# Patient Record
Sex: MALE | Race: WHITE | NOT HISPANIC OR LATINO | Employment: OTHER | URBAN - METROPOLITAN AREA
[De-identification: names, ages, dates, MRNs, and addresses within clinical notes are randomized per-mention and may not be internally consistent; named-entity substitution may affect disease eponyms.]

---

## 2017-11-13 ENCOUNTER — HOSPITAL ENCOUNTER (EMERGENCY)
Facility: HOSPITAL | Age: 47
Discharge: HOME | End: 2017-11-13
Attending: EMERGENCY MEDICINE | Admitting: EMERGENCY MEDICINE

## 2017-11-13 ENCOUNTER — APPOINTMENT (EMERGENCY)
Dept: RADIOLOGY | Facility: HOSPITAL | Age: 47
End: 2017-11-13

## 2017-11-13 VITALS
TEMPERATURE: 98 F | DIASTOLIC BLOOD PRESSURE: 99 MMHG | SYSTOLIC BLOOD PRESSURE: 145 MMHG | BODY MASS INDEX: 34.72 KG/M2 | WEIGHT: 248 LBS | HEIGHT: 71 IN | RESPIRATION RATE: 20 BRPM | HEART RATE: 78 BPM | OXYGEN SATURATION: 100 %

## 2017-11-13 DIAGNOSIS — M54.9 BACK PAIN: Primary | ICD-10-CM

## 2017-11-13 DIAGNOSIS — M54.50 LUMBAR BACK PAIN: ICD-10-CM

## 2017-11-13 PROCEDURE — 99283 EMERGENCY DEPT VISIT LOW MDM: CPT

## 2017-11-13 PROCEDURE — 72100 X-RAY EXAM L-S SPINE 2/3 VWS: CPT

## 2017-11-13 RX ORDER — METHOCARBAMOL 500 MG/1
1000 TABLET, FILM COATED ORAL ONCE
Status: COMPLETED | OUTPATIENT
Start: 2017-11-13 | End: 2017-11-13

## 2017-11-13 RX ORDER — PREDNISONE 20 MG/1
60 TABLET ORAL DAILY
Status: DISCONTINUED | OUTPATIENT
Start: 2017-11-14 | End: 2017-11-13

## 2017-11-13 RX ORDER — PREDNISONE 20 MG/1
60 TABLET ORAL DAILY
Qty: 15 TABLET | Refills: 0 | Status: SHIPPED | OUTPATIENT
Start: 2017-11-13 | End: 2017-11-18

## 2017-11-13 RX ORDER — NAPROXEN SODIUM 220 MG
220 TABLET ORAL EVERY 6 HOURS PRN
COMMUNITY

## 2017-11-13 RX ORDER — PREDNISONE 20 MG/1
60 TABLET ORAL ONCE
Status: COMPLETED | OUTPATIENT
Start: 2017-11-13 | End: 2017-11-13

## 2017-11-13 RX ORDER — METHOCARBAMOL 750 MG/1
750 TABLET, FILM COATED ORAL 3 TIMES DAILY
Qty: 15 TABLET | Refills: 0 | Status: SHIPPED | OUTPATIENT
Start: 2017-11-13 | End: 2022-06-09

## 2017-11-13 RX ADMIN — METHOCARBAMOL 1000 MG: 500 TABLET ORAL at 16:27

## 2017-11-13 RX ADMIN — PREDNISONE 60 MG: 20 TABLET ORAL at 16:28

## 2017-11-13 NOTE — ED NOTES
D/c instructions and rx reviewed w/ pt  All questions and concerns addressed at this time         Ministerio Ortiz RN  11/13/17 1151

## 2017-11-13 NOTE — DISCHARGE INSTRUCTIONS
Acute Low Back Pain, Ambulatory Care   GENERAL INFORMATION:   Acute low back pain  is discomfort in your lower back area that lasts for less than 12 weeks  The word acute is used to describe pain that starts suddenly, worsens quickly, and lasts for a short time  Common symptoms include the following:   · Back stiffness or spasms    · Pain down the back or side of one leg    · Holding yourself in an unusual position or posture to decrease your back pain    · Not being able to find a sitting position that is comfortable    · Slow increase in your pain for 24 to 48 hours after you stress your back    · Tenderness on your lower back or severe pain when you move your back  Seek immediate care for the following symptoms:   · Severe pain    · Sudden stiffness and heaviness in both buttocks down to both legs    · Numbness or weakness in one leg, or pain in both legs    · Numbness in your genital area or across your lower back    · Unable to control your urine or bowel movements  Treatment for acute low back pain  may include any of the following:  · Medicines:      ¨ NSAIDs  help decrease swelling and pain or fever  This medicine is available with or without a doctor's order  NSAIDs can cause stomach bleeding or kidney problems in certain people  If you take blood thinner medicine, always ask your healthcare provider if NSAIDs are safe for you  Always read the medicine label and follow directions  ¨ Muscle relaxers  help decrease muscle spasms pain  ¨ Prescription pain medicine  may be given  Ask how to take this medicine safely  · Surgery  may be needed if your pain is severe and other treatments do not work  Surgery may be needed for conditions of the lumbar spine, such as herniated disc or spinal stenosis  Manage your symptoms:   · Sleep on a firm mattress  If you do not have a firm mattress, have someone move your mattress to the floor for a few days   A piece of plywood under your mattress can also help make it firmer  · Apply ice  on your lower back for 15 to 20 minutes every hour or as directed  Use an ice pack, or put crushed ice in a plastic bag  Cover it with a towel  Ice helps prevent tissue damage and decreases swelling and pain  You can alternate ice and heat  · Apply heat  on your lower back for 20 to 30 minutes every 2 hours for as many days as directed  Heat helps decrease pain and muscle spasms  · Go to physical therapy  A physical therapist teaches you exercises to help improve movement and strength, and to decrease pain  Prevent acute low back pain:   · Use proper body mechanics  ¨ Bend at the hips and knees when you  objects  Do not bend from the waist  Use your leg muscles as you lift the load  Do not use your back  Keep the object close to your chest as you lift it  Try not to twist or lift anything above your waist     ¨ Change your position often when you stand for long periods of time  Rest one foot on a small box or footrest, and then switch to the other foot often  ¨ Try not to sit for long periods of time  When you do, sit in a straight-backed chair with your feet flat on the floor  Never reach, pull, or push while you are sitting  · Exercise regularly  Warm up before you exercise  Do exercises that strengthen your back muscles  Ask about the best exercise plan for you  · Maintain a healthy weight  Ask your healthcare provider how much you should weigh  Ask him to help you create a weight loss plan if you are overweight  Follow up with your healthcare provider as directed:  Return for a follow-up visit if you still have pain after 1 to 3 weeks of treatment  You may need to visit an orthopedist if your back pain lasts more than 6 to 12 weeks  Write down your questions so you remember to ask them during your visits  CARE AGREEMENT:   You have the right to help plan your care  Learn about your health condition and how it may be treated   Discuss treatment options with your caregivers to decide what care you want to receive  You always have the right to refuse treatment  The above information is an  only  It is not intended as medical advice for individual conditions or treatments  Talk to your doctor, nurse or pharmacist before following any medical regimen to see if it is safe and effective for you  © 2014 [address] is for End User's use only and may not be sold, redistributed or otherwise used for commercial purposes  All illustrations and images included in CareNotes® are the copyrighted property of A D A M , Inc  or Jerry Pantoja

## 2017-11-13 NOTE — ED PROVIDER NOTES
History  Chief Complaint   Patient presents with    Back Pain     Pt states he has been having R lower back pain for about 1 year  Pt states pain is radiating to R hip down to R foot  Pt c/o tingling in R thigh  Pt denies any injury or trauma to back  Pt denies any trouble urinating or moving bowels  49-year-old male patient presents emergency department for evaluation of one year of sciatic back pain which is worse over the last two days  Discussed this with him and the only reason he states he came to the emergency department was because his wife urged him to  The patient works as a donald, 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 and is known to have degenerative changes in his back  The patient and I discussed the utility of an x-ray purely because he has not had any done any time recently and this was done  The x-ray was reviewed primarily interpreted by me and I see only chronic changes  Patient has no signs of cauda equina  The patient will be treated for back pain and sciatica  History provided by:  Patient   used: No    Back Pain   Location:  Lumbar spine  Quality:  Aching  Radiates to:  Does not radiate  Pain severity:  Mild  Pain is:  Same all the time  Onset quality:  Gradual  Timing:  Constant  Progression:  Worsening  Chronicity:  New  Context: not jumping from heights, not lifting heavy objects, not MVA, not recent illness and not recent injury    Relieved by:  Nothing  Worsened by:  Nothing  Ineffective treatments:  None tried  Associated symptoms: no abdominal swelling, no bowel incontinence, no dysuria, no headaches, no leg pain, no numbness, no pelvic pain and no weakness        Prior to Admission Medications   Prescriptions Last Dose Informant Patient Reported?  Taking?   naproxen sodium (ALEVE) 220 MG tablet   Yes Yes   Sig: Take 220 mg by mouth every 6 (six) hours as needed for mild pain      Facility-Administered Medications: None       History reviewed  No pertinent past medical history  Past Surgical History:   Procedure Laterality Date    KNEE ARTHROSCOPY W/ MENISCAL REPAIR Bilateral     ROTATOR CUFF REPAIR Left        History reviewed  No pertinent family history  I have reviewed and agree with the history as documented  Social History   Substance Use Topics    Smoking status: Never Smoker    Smokeless tobacco: Never Used    Alcohol use Yes        Review of Systems   Gastrointestinal: Negative for bowel incontinence  Genitourinary: Negative for dysuria and pelvic pain  Musculoskeletal: Positive for back pain  Neurological: Negative for weakness, numbness and headaches  All other systems reviewed and are negative  Physical Exam  ED Triage Vitals [11/13/17 1504]   Temperature Pulse Respirations Blood Pressure SpO2   98 °F (36 7 °C) 78 20 145/99 100 %      Temp Source Heart Rate Source Patient Position - Orthostatic VS BP Location FiO2 (%)   Oral Monitor Sitting Left arm --      Pain Score       5           Orthostatic Vital Signs  Vitals:    11/13/17 1504   BP: 145/99   Pulse: 78   Patient Position - Orthostatic VS: Sitting       Physical Exam   Constitutional: He is oriented to person, place, and time  He appears well-developed and well-nourished  No distress  HENT:   Head: Normocephalic and atraumatic  Right Ear: External ear normal    Left Ear: External ear normal    Eyes: Conjunctivae and EOM are normal  Right eye exhibits no discharge  Left eye exhibits no discharge  No scleral icterus  Neck: Normal range of motion  Neck supple  No JVD present  No tracheal deviation present  No thyromegaly present  Cardiovascular: Normal rate and regular rhythm  Pulmonary/Chest: Effort normal and breath sounds normal  No stridor  No respiratory distress  He has no wheezes  He has no rales  Abdominal: Soft  Bowel sounds are normal  He exhibits no distension  There is no tenderness  Musculoskeletal: Normal range of motion  He exhibits no edema, tenderness or deformity  Neurological: He is alert and oriented to person, place, and time  No cranial nerve deficit  Coordination normal    Skin: Skin is warm and dry  He is not diaphoretic  Psychiatric: He has a normal mood and affect  His behavior is normal    Nursing note and vitals reviewed  ED Medications  Medications   methocarbamol (ROBAXIN) tablet 1,000 mg (1,000 mg Oral Given 11/13/17 1627)   predniSONE tablet 60 mg (60 mg Oral Given 11/13/17 1628)       Diagnostic Studies  Results Reviewed     None                 XR spine lumbar 2 or 3 views injury   Final Result by Rohan Llamas MD (11/13 1548)   Progressive multilevel degenerative spondylosis      No acute spinal osseous abnormality or malalignment         Workstation performed: [id number]                    Procedures  Procedures       Phone Contacts  ED Phone Contact    ED Course  ED Course                                MDM  Number of Diagnoses or Management Options  Back pain: new and requires workup  Lumbar back pain: new and requires workup     Amount and/or Complexity of Data Reviewed  Decide to obtain previous medical records or to obtain history from someone other than the patient: yes  Review and summarize past medical records: yes  Independent visualization of images, tracings, or specimens: yes    Patient Progress  Patient progress: stable    CritCare Time    Disposition  Final diagnoses:   Back pain   Lumbar back pain     Time reflects when diagnosis was documented in both MDM as applicable and the Disposition within this note     Time User Action Codes Description Comment    11/13/2017  4:20 PM Juan Pacheco Add [M54 9] Back pain     11/13/2017  4:21 PM Juan Pacheco Add [M54 5] Lumbar back pain       ED Disposition     ED Disposition Condition Comment    Discharge  Sydell Nipple discharge to home/self care      Condition at discharge: Good        Follow-up Information     Follow up With Specialties Details Why Contact Info Additional Information    [facility] Emergency Department Emergency Medicine  As needed [address] [facility] [address]  [address] [place] [address] ED, [address] [place], [place], [facility]        Discharge Medication List as of 11/13/2017  4:22 PM      START taking these medications    Details   methocarbamol (ROBAXIN) 750 mg tablet Take 1 tablet by mouth 3 (three) times a day for 5 days, Starting Mon 11/13/2017, Until Sat 11/18/2017, Print      predniSONE 20 mg tablet Take 3 tablets by mouth daily for 5 days, Starting Mon 11/13/2017, Until Sat 11/18/2017, Print         CONTINUE these medications which have NOT CHANGED    Details   naproxen sodium (ALEVE) 220 MG tablet Take 220 mg by mouth every 6 (six) hours as needed for mild pain, Historical Med           No discharge procedures on file      ED Provider  Electronically Signed by           Alok Patel DO  11/17/17 0207

## 2019-07-29 ENCOUNTER — NURSE TRIAGE (OUTPATIENT)
Dept: PHYSICAL THERAPY | Facility: OTHER | Age: 49
End: 2019-07-29

## 2019-07-29 DIAGNOSIS — M54.50 ACUTE MIDLINE LOW BACK PAIN WITHOUT SCIATICA: Primary | ICD-10-CM

## 2019-07-29 NOTE — TELEPHONE ENCOUNTER
Background - Initial Assessment  Clinical complaint: Acute on chronic low back pain, Pt has this current episode for the past 3 days  He has x-rays through us, and has numbness in his bilat feet, and it does go up to his mid-back area  Aleve, but states that it is not working, and he is using ice  Pt was told that he has arthritis in his back, and he works in construction  Date of onset: 3 days  Mechanism of injury: none    Previous Treatment - Previous Treatment  Previous evaluation: none  Current provider: PCP  Diagnostics: x-rays  Previous treatment: physical therapy for back, and shoulder, over the counter meds, and Ice  Protocols used: [facility] COMPREHENSIVE SPINE PROGRAM PROTOCOL    This nurse did review in detail the comp spine program and what we can provide for the pt for their back pain  Pt is agreeable to being triaged by this nurse and would like to have physical therapy  Referrals were placed to the following:  Physical Therapy at the [facility]     site  Pt was transferred to  to make evaluation appointment  [facility] referral was sent and Pt is aware that they will be receiving a phone call from that office  PM&R with Melo ESPANA at [address] [facility] [address] at the [employer]  Pt is aware that they will be receiving a phone call from that office to make their appointments  Pt is aware of who they will be seeing and location of that office  No further questions voiced at this time and Pt did state understanding of the referral that was placed

## 2022-05-23 ENCOUNTER — HOSPITAL ENCOUNTER (EMERGENCY)
Facility: HOSPITAL | Age: 52
Discharge: HOME | End: 2022-05-23
Attending: EMERGENCY MEDICINE | Admitting: EMERGENCY MEDICINE

## 2022-05-23 VITALS
BODY MASS INDEX: 38.49 KG/M2 | DIASTOLIC BLOOD PRESSURE: 102 MMHG | SYSTOLIC BLOOD PRESSURE: 202 MMHG | OXYGEN SATURATION: 94 % | WEIGHT: 274.91 LBS | HEIGHT: 71 IN | HEART RATE: 94 BPM | RESPIRATION RATE: 18 BRPM | TEMPERATURE: 99.5 F

## 2022-05-23 DIAGNOSIS — R03.0 ELEVATED BLOOD PRESSURE READING: ICD-10-CM

## 2022-05-23 DIAGNOSIS — M25.511 RIGHT SHOULDER PAIN: Primary | ICD-10-CM

## 2022-05-23 DIAGNOSIS — M79.606 LEG PAIN: ICD-10-CM

## 2022-05-23 PROCEDURE — 99283 EMERGENCY DEPT VISIT LOW MDM: CPT

## 2022-05-23 PROCEDURE — 99284 EMERGENCY DEPT VISIT MOD MDM: CPT | Performed by: EMERGENCY MEDICINE

## 2022-05-23 RX ORDER — PREDNISONE 20 MG/1
40 TABLET ORAL DAILY
Qty: 10 TABLET | Refills: 0 | Status: SHIPPED | OUTPATIENT
Start: 2022-05-23 | End: 2022-06-09

## 2022-05-23 NOTE — ED PROVIDER NOTES
History  Chief Complaint   Patient presents with    Leg Pain     R leg pain, started in lower back and now radiates down leg  Seen at chiropractor "but he made it worse " No known injury     45 yo male with h/o sciatica who presents to ED c/o R mehul pain  Starts in back and radiates to R lateral and anterior leg  Started approximately one week ago, states he woke up with it  No trauma  No fever  No weakness or numbness  Similar to pain from prior sciatica  Also c/o R shoulder pain worsened with attempting to lift RUE above his head  No arm numbness or weakness  No direct trauma  No relief of sxs from nsaids  Prior to Admission Medications   Prescriptions Last Dose Informant Patient Reported? Taking? methocarbamol (ROBAXIN) 750 mg tablet   No No   Sig: Take 1 tablet by mouth 3 (three) times a day for 5 days   naproxen sodium (ALEVE) 220 MG tablet   Yes No   Sig: Take 220 mg by mouth every 6 (six) hours as needed for mild pain      Facility-Administered Medications: None       History reviewed  No pertinent past medical history  Past Surgical History:   Procedure Laterality Date    KNEE ARTHROSCOPY W/ MENISCAL REPAIR Bilateral     ROTATOR CUFF REPAIR Left        History reviewed  No pertinent family history  I have reviewed and agree with the history as documented  E-Cigarette/Vaping    E-Cigarette Use Never User      E-Cigarette/Vaping Substances     Social History     Tobacco Use    Smoking status: Never Smoker    Smokeless tobacco: Never Used   Vaping Use    Vaping Use: Never used   Substance Use Topics    Alcohol use: Yes     Comment: sobor x1 mo 5/2022    Drug use: No       Review of Systems   Musculoskeletal: Positive for arthralgias and back pain  All other systems reviewed and are negative  Physical Exam  Physical Exam  Vitals and nursing note reviewed  Constitutional:       General: He is not in acute distress  Appearance: Normal appearance  He is well-developed   He is not ill-appearing, toxic-appearing or diaphoretic  HENT:      Head: Normocephalic and atraumatic  Eyes:      General:         Right eye: No discharge  Left eye: No discharge  Conjunctiva/sclera: Conjunctivae normal       Pupils: Pupils are equal, round, and reactive to light  Neck:      Vascular: No JVD  Pulmonary:      Effort: Pulmonary effort is normal       Breath sounds: No stridor  Musculoskeletal:         General: No swelling, tenderness, deformity or signs of injury  Normal range of motion  Cervical back: Normal range of motion and neck supple  Right lower leg: No edema  Left lower leg: No edema  Skin:     General: Skin is warm and dry  Capillary Refill: Capillary refill takes less than 2 seconds  Coloration: Skin is not jaundiced or pale  Findings: No bruising, erythema, lesion or rash  Neurological:      General: No focal deficit present  Mental Status: He is alert and oriented to person, place, and time  Cranial Nerves: No cranial nerve deficit  Sensory: No sensory deficit  Motor: No weakness or abnormal muscle tone        Coordination: Coordination normal       Gait: Gait normal          Vital Signs  ED Triage Vitals [05/23/22 1606]   Temperature Pulse Respirations Blood Pressure SpO2   99 5 °F (37 5 °C) 94 18 (!) 202/102 94 %      Temp Source Heart Rate Source Patient Position - Orthostatic VS BP Location FiO2 (%)   Oral Monitor Sitting Left arm --      Pain Score       --           Vitals:    05/23/22 1606   BP: (!) 202/102   Pulse: 94   Patient Position - Orthostatic VS: Sitting         Visual Acuity      ED Medications  Medications - No data to display    Diagnostic Studies  Results Reviewed     None                 No orders to display              Procedures  Procedures         ED Course                               SBIRT 22yo+    Flowsheet Row Most Recent Value   SBIRT (23 yo +)    In order to provide better care to our patients, we are screening all of our patients for alcohol and drug use  Would it be okay to ask you these screening questions? No Filed at: 05/23/2022 1625                    MDM    Disposition  Final diagnoses:   Right shoulder pain   Leg pain   Elevated blood pressure reading     Time reflects when diagnosis was documented in both MDM as applicable and the Disposition within this note     Time User Action Codes Description Comment    5/23/2022  4:32 PM Jo Ann Ra Add [M79 605] Left leg pain     5/23/2022  4:32 PM Jo Ann Ra Remove [M79 605] Left leg pain     5/23/2022  4:32 PM Jo Ann Ra Add [S34 613] Right leg pain     5/23/2022  4:32 PM Jo Ann Ra Add [M25 511] Right shoulder pain     5/23/2022  4:32 PM Jo Ann Ra Modify [M39 508] Right shoulder pain     5/23/2022  4:32 PM Jo Ann Ra Remove [C91 320] Right leg pain     5/23/2022  4:32 PM Jo Ann Ra Add [M79 606] Leg pain     5/23/2022  6:42 PM HaiderNevaeh Add [R03 0] Elevated blood pressure reading       ED Disposition     ED Disposition   Discharge    Condition   Stable    Date/Time   Mon May 23, 2022  4:32 PM    Comment   Derril Metro discharge to home/self care                 Follow-up Information     Follow up With Specialties Details Why Contact Info Additional Information    [facility] Physical Therapy Call in 1 day  [phone]    Jeremy Nolan MD Orthopedic Surgery In 1 week for further evaluation of your shoulder pain [address] [address]  [phone]             Discharge Medication List as of 5/23/2022  4:33 PM      START taking these medications    Details   predniSONE 20 mg tablet Take 2 tablets (40 mg total) by mouth in the morning , Starting Mon 5/23/2022, Print         CONTINUE these medications which have NOT CHANGED    Details   methocarbamol (ROBAXIN) 750 mg tablet Take 1 tablet by mouth 3 (three) times a day for 5 days, Starting Mon 11/13/2017, Until Sat 11/18/2017, Print      naproxen sodium (ALEVE) 220 MG tablet Take 220 mg by mouth every 6 (six) hours as needed for mild pain, Historical Med             No discharge procedures on file      PDMP Review     None          ED Provider  Electronically Signed by           Gary Allen MD  05/23/22 1204

## 2022-05-24 ENCOUNTER — NURSE TRIAGE (OUTPATIENT)
Dept: PHYSICAL THERAPY | Facility: OTHER | Age: 52
End: 2022-05-24

## 2022-05-24 DIAGNOSIS — G89.29 ACUTE EXACERBATION OF CHRONIC LOW BACK PAIN: Primary | ICD-10-CM

## 2022-05-24 DIAGNOSIS — M54.50 ACUTE EXACERBATION OF CHRONIC LOW BACK PAIN: Primary | ICD-10-CM

## 2022-05-24 NOTE — TELEPHONE ENCOUNTER
Additional Information   Negative: Is this related to a work injury?  Negative: Is this related to an MVA?  Negative: Are you currently recieving homecare services? Background - Initial Assessment  Clinical complaint: Pain starts mid back  And travels down right leg to ankle  Has numbness and tingling  Started 5/3/22  Was seen by ED and told to call csp  [facility]  Patient states he has arthritis in his back  Was seen by ED, PT , and chiro     Date of onset: 5/3/22  Frequency of pain: intermittent  Quality of pain: aching, burning, sharp, shooting and stabbing    **Patient does not have H I  says he "pays chavez OOP**    Protocols used: [facility] AMB COMPREHENSIVE SPINE PROGRAM PROTOCOL

## 2022-05-24 NOTE — TELEPHONE ENCOUNTER
Nurse reached out to proceed with triage for PT evaluation  Message left with reason for call and SL CB information  Nurse encouraged pt to return call when able to finish triage process  No referral to close/defer    Will await CB from patient

## 2022-05-24 NOTE — TELEPHONE ENCOUNTER
Additional Information   Negative: Has the patient had unexplained weight loss?  Negative: Does the patient have a fever?  Negative: Is the patient experiencing blood in sputum?  Negative: Is the patient experiencing urine retention?  Negative: Is the patient experiencing acute drop foot or paralysis?  Negative: Has the patient experienced major trauma? (fall from height, high speed collision, direct blow to spine) and is also experiencing nausea, light-headedness, or loss of consciousness?  Affirmative: Is this a chronic condition? Protocols used: [facility] AMB COMPREHENSIVE SPINE PROGRAM PROTOCOL    This RN did review in detail the Comprehensive Spine Program and what we can provide for their back pain  Patient is agreeable to being triaged by this RN and would like to proceed with Physical Therapy  Referral was placed for Physical Therapy at the [facility] site  Patients information was sent to the  to make evaluation appointment  Patient made aware that the PT office  will be calling to schedule the appointment  Patient was provided with the phone number to the PT office  No further questions and/or concerns were voiced by the patient at this time  Patient states understanding of the referral that was placed

## 2022-06-01 ENCOUNTER — EVALUATION (OUTPATIENT)
Dept: PHYSICAL THERAPY | Facility: CLINIC | Age: 52
End: 2022-06-01

## 2022-06-01 DIAGNOSIS — M54.50 ACUTE EXACERBATION OF CHRONIC LOW BACK PAIN: ICD-10-CM

## 2022-06-01 DIAGNOSIS — G89.29 ACUTE EXACERBATION OF CHRONIC LOW BACK PAIN: ICD-10-CM

## 2022-06-01 PROCEDURE — 97161 PT EVAL LOW COMPLEX 20 MIN: CPT | Performed by: PHYSICAL THERAPIST

## 2022-06-01 NOTE — PROGRESS NOTES
PT Evaluation     Today's date: 2022  Patient name: Ca Bauer  : 1970  MRN: [id number]  Referring provider: Miriam Tay, PT  Dx:   Encounter Diagnosis     ICD-10-CM    1  Acute exacerbation of chronic low back pain  M54 50 Ambulatory referral to PT spine    G89 29                   Assessment  Assessment details: Ca Bauer is a 46 year male presenting to physical therapy with chief complaints of lumbar radiculopathy  A mechanical assessment of the lumbar spine was performed today which was unsuccessful in reducing symptoms  He does have right lower extremity weakness in a L3/4 pattern  He also has decreased sensation along the lateral aspect of the right thigh  Secondary to the patient chronicity of symptoms, lower extremity weakness, lower extremity decreased sensation, and an inability to reduce symptoms with movements, patient is referred to orthopedic surgery for further consultation  Patient in agreement with POC  DC from PT  Impairments: abnormal or restricted ROM, activity intolerance, impaired physical strength and pain with function    Goals  No goals necessary secondary to referral to orthopedic surgery     Plan  Plan details: DC from PT        Subjective Evaluation    History of Present Illness  Mechanism of injury: Patient reports that he has a chronic history of lower back pain, works as a donald  He notes that about 4 weeks ago, he woke up and had no feeling in his lower extremities and had difficulties moving his lower extremity  He reports that his RLE continues to be weak  He reports having numbness and tingling on the lateral aspect of the R thigh  Notes that when he sits he also has pain in the gluteal region on the R side  Has been limiting his activity for the past couple of weeks  He reports having difficulty sleeping secondary to pain    He went to the ED on  and was given a prednisone taper which he reports helped but once he finished it, his pain levels have been returning  Patient denies having changes in bowel or bladder  He reports that he went to the chiropractor for treatment and feels that his back pain has been worse since that time  Pain  Current pain ratin  At worst pain rating: 10    Patient Goals  Patient goals for therapy: decreased pain, increased motion and increased strength          Objective     Neurological Testing     Sensation     Lumbar   Left   Intact: light touch    Right   Diminished: light touch  Paresthesia: light touch    Comments   Right light touch: L3/4 pattern    Reflexes   Left   Patellar (L4): normal (2+)  Achilles (S1): normal (2+)    Right   Patellar (L4): normal (2+)  Achilles (S1): normal (2+)    Active Range of Motion     Lumbar   Flexion:  with pain Restriction level: moderate  Extension:  with pain Restriction level: maximal  Left lateral flexion:  with pain Restriction level: moderate  Right lateral flexion:  Restriction level: minimal    Strength/Myotome Testing     Left Hip   Planes of Motion   Flexion: WFL    Right Hip   Planes of Motion   Flexion: 4+    Right Knee   Flexion: 3+  Extension: 3+    Right Ankle/Foot   Dorsiflexion: 5  Plantar flexion: 5  Great toe extension: 5    Muscle Activation   Patient able to activate left transverse abdominals, left multifidus, right transverse abdominals and right multifidus  Additional Muscle Activation Details  Fair Activation     Tests     Lumbar     Left   Negative crossed SLR, passive SLR and slump test      Right   Positive passive SLR and slump test    Negative crossed SLR  General Comments:      Lumbar Comments  Unable to reduce symptoms with repeated movements  Repeated movements in flexion and extension both produced symptoms and made it worse                 Precautions: Patient tolerance       Manuals                                                                 Neuro Re-Ed Ther Ex                                                                                                                     Ther Activity                                       Gait Training                                       Modalities

## 2022-06-04 ENCOUNTER — APPOINTMENT (EMERGENCY)
Dept: RADIOLOGY | Facility: HOSPITAL | Age: 52
End: 2022-06-04

## 2022-06-04 ENCOUNTER — HOSPITAL ENCOUNTER (EMERGENCY)
Facility: HOSPITAL | Age: 52
Discharge: HOME | End: 2022-06-04
Attending: EMERGENCY MEDICINE

## 2022-06-04 VITALS
OXYGEN SATURATION: 100 % | HEIGHT: 71 IN | WEIGHT: 268 LBS | SYSTOLIC BLOOD PRESSURE: 191 MMHG | RESPIRATION RATE: 18 BRPM | HEART RATE: 86 BPM | BODY MASS INDEX: 37.52 KG/M2 | TEMPERATURE: 97.4 F | DIASTOLIC BLOOD PRESSURE: 104 MMHG

## 2022-06-04 DIAGNOSIS — M25.569 KNEE PAIN: Primary | ICD-10-CM

## 2022-06-04 PROCEDURE — 99284 EMERGENCY DEPT VISIT MOD MDM: CPT | Performed by: EMERGENCY MEDICINE

## 2022-06-04 PROCEDURE — 99283 EMERGENCY DEPT VISIT LOW MDM: CPT

## 2022-06-04 PROCEDURE — 96372 THER/PROPH/DIAG INJ SC/IM: CPT

## 2022-06-04 PROCEDURE — 73564 X-RAY EXAM KNEE 4 OR MORE: CPT

## 2022-06-04 RX ORDER — KETOROLAC TROMETHAMINE 30 MG/ML
15 INJECTION, SOLUTION INTRAMUSCULAR; INTRAVENOUS ONCE
Status: COMPLETED | OUTPATIENT
Start: 2022-06-04 | End: 2022-06-04

## 2022-06-04 RX ORDER — ACETAMINOPHEN 325 MG/1
975 TABLET ORAL ONCE
Status: COMPLETED | OUTPATIENT
Start: 2022-06-04 | End: 2022-06-04

## 2022-06-04 RX ORDER — LIDOCAINE 50 MG/G
1 PATCH TOPICAL DAILY
Qty: 30 PATCH | Refills: 0 | Status: SHIPPED | OUTPATIENT
Start: 2022-06-04 | End: 2022-06-09

## 2022-06-04 RX ORDER — LIDOCAINE 50 MG/G
2 PATCH TOPICAL ONCE
Status: DISCONTINUED | OUTPATIENT
Start: 2022-06-04 | End: 2022-06-04 | Stop reason: HOSPADM

## 2022-06-04 RX ADMIN — KETOROLAC TROMETHAMINE 15 MG: 30 INJECTION, SOLUTION INTRAMUSCULAR at 10:17

## 2022-06-04 RX ADMIN — ACETAMINOPHEN 975 MG: 325 TABLET, FILM COATED ORAL at 10:11

## 2022-06-04 RX ADMIN — LIDOCAINE 5% 2 PATCH: 700 PATCH TOPICAL at 10:13

## 2022-06-04 NOTE — ED PROVIDER NOTES
History  Chief Complaint   Patient presents with    Knee Pain     Rt knee swelling and pain  Has been previously seen and referred to ortho (apt on the 13 th), but cannot handle pain until then  Patient is a 64-year-old male no past medical history presenting with right knee pain  Patient states the last 5-6 weeks he has had right knee pain and pain to the bilateral hips  He states that it is intermittent, worse with going from sitting to standing or lying to sitting and worse with ambulation  He states that he was seen here on the 23rd for back pain and given comprehensive spine follow-up  He states that he has seen physical therapy in but referred to Orthopedic surgery but has not been able to see them as yet, appointment in 2 weeks  Was initially given prednisone in the ER and states that that did improve his symptoms for week however the returned  He states that after returning roughly 2 weeks ago he had right knee swelling which is improved but not fully resolved since that time  He is taking Advil and Tylenol in combination prep every 6 hours with some relief, last dose was 6 hours ago  He denies any fevers and notes numbness and tingling to the right thigh which is intermittent  Denies any falls, fevers, urinary retention, saddle anesthesia, bowel or bladder incontinence  Pain is aching and nonradiating in nature  Prior to Admission Medications   Prescriptions Last Dose Informant Patient Reported? Taking? methocarbamol (ROBAXIN) 750 mg tablet   No No   Sig: Take 1 tablet by mouth 3 (three) times a day for 5 days   naproxen sodium (ALEVE) 220 MG tablet   Yes No   Sig: Take 220 mg by mouth every 6 (six) hours as needed for mild pain   predniSONE 20 mg tablet   No No   Sig: Take 2 tablets (40 mg total) by mouth in the morning  Facility-Administered Medications: None       History reviewed  No pertinent past medical history      Past Surgical History:   Procedure Laterality Date    KNEE ARTHROSCOPY W/ MENISCAL REPAIR Bilateral     ROTATOR CUFF REPAIR Left        History reviewed  No pertinent family history  I have reviewed and agree with the history as documented  E-Cigarette/Vaping    E-Cigarette Use Never User      E-Cigarette/Vaping Substances     Social History     Tobacco Use    Smoking status: Never Smoker    Smokeless tobacco: Never Used   Vaping Use    Vaping Use: Never used   Substance Use Topics    Alcohol use: Yes     Comment: sobor x1 mo 5/2022    Drug use: No       Review of Systems   All other systems reviewed and are negative  Physical Exam  Physical Exam  Vitals reviewed  Constitutional:       General: He is not in acute distress  Appearance: Normal appearance  He is not ill-appearing  HENT:      Mouth/Throat:      Mouth: Mucous membranes are moist    Eyes:      Conjunctiva/sclera: Conjunctivae normal    Cardiovascular:      Rate and Rhythm: Normal rate  Pulses: Normal pulses  Pulmonary:      Effort: Pulmonary effort is normal    Abdominal:      General: Abdomen is flat  Musculoskeletal:         General: Swelling and tenderness present  Normal range of motion  Cervical back: Neck supple  Comments: Patient has medial and lateral and anterior tenderness the knee, no ligamentous instability, ambulatory bedside with antalgic gait, no spinal tenderness, negative straight leg raise bilaterally, intact lower extremity sensation, motor, pulses   Skin:     General: Skin is warm and dry  Capillary Refill: Capillary refill takes less than 2 seconds  Neurological:      General: No focal deficit present  Mental Status: He is alert  Sensory: No sensory deficit  Motor: No weakness     Psychiatric:         Mood and Affect: Mood normal          Vital Signs  ED Triage Vitals [06/04/22 0920]   Temperature Pulse Respirations Blood Pressure SpO2   (!) 97 4 °F (36 3 °C) 86 18 (!) 191/104 100 %      Temp Source Heart Rate Source Patient Position - Orthostatic VS BP Location FiO2 (%)   Tympanic Monitor Sitting Left arm --      Pain Score       --           Vitals:    06/04/22 0920   BP: (!) 191/104   Pulse: 86   Patient Position - Orthostatic VS: Sitting         Visual Acuity      ED Medications  Medications   ketorolac (TORADOL) injection 15 mg (15 mg Intramuscular Given 6/4/22 1017)   acetaminophen (TYLENOL) tablet 975 mg (975 mg Oral Given 6/4/22 1011)       Diagnostic Studies  Results Reviewed     None                 XR knee 4+ vw right injury   ED Interpretation by Yassine Lazo DO (06/04 5778)   NAD      Final Result by John Hayward DO (06/04 0047)      No acute osseous abnormality  Moderate joint effusion  Degenerative changes as described  Workstation performed: [id number]                    Procedures  Procedures         ED Course                                             MDM  Number of Diagnoses or Management Options  Diagnosis management comments: Patient is a 22-year-old male no past medical history presenting with knee pain  Patient is well-appearing bedside stable vitals and in no acute distress  He does have hypertension but no history of same denies any hypertensive symptoms  Will give pain control and reassess  Patient does have mild edema with no erythema, increased warmth, with intact range of motion knee and ambulatory bedside with antalgic gait  He has negative straight leg raise bilaterally denies any red flag symptoms of cauda equina and has no spinal tenderness  Will obtain screening x-ray of the knee and if unremarkable will give Voltaren cream for likely arthritis, have advised appropriate Motrin and Tylenol dosing as patient was using a combination prep with suboptimal doses of both Motrin and Tylenol, will give orthopedic follow-up for knee pain and have advised continued follow-up with comprehensive spine for back pain    As patient has no ligamentous instability or injuries and is suffering back pain as well do not feel that knee immobilizer and crutches well improve his symptoms  Disposition  Final diagnoses:   Knee pain     Time reflects when diagnosis was documented in both MDM as applicable and the Disposition within this note     Time User Action Codes Description Comment    6/4/2022  9:59 AM Carol Rush Add [M25 569] Knee pain       ED Disposition     ED Disposition   Discharge    Condition   Stable    Date/Time   Sat Jun 4, 2022  9:59 AM    Comment   Darian Hays discharge to home/self care  Follow-up Information     Follow up With Specialties Details Why Contact Info Additional [address] [facility] Schedule an appointment as soon as possible for a visit   [address] [facility] [address] [phone]  [address] [facility], [id number] [place] [address], [address] [facility]          Discharge Medication List as of 6/4/2022  9:59 AM      START taking these medications    Details   Diclofenac Sodium (VOLTAREN) 1 % Apply 2 g topically 4 (four) times a day, Starting Sat 6/4/2022, Normal      lidocaine (LIDODERM) 5 % Apply 1 patch topically daily Remove & Discard patch within 12 hours or as directed by MD, Starting Sat 6/4/2022, Normal         CONTINUE these medications which have NOT CHANGED    Details   methocarbamol (ROBAXIN) 750 mg tablet Take 1 tablet by mouth 3 (three) times a day for 5 days, Starting Mon 11/13/2017, Until Sat 11/18/2017, Print      naproxen sodium (ALEVE) 220 MG tablet Take 220 mg by mouth every 6 (six) hours as needed for mild pain, Historical Med      predniSONE 20 mg tablet Take 2 tablets (40 mg total) by mouth in the morning , Starting Mon 5/23/2022, Print             No discharge procedures on file      PDMP Review     None          ED Provider  Electronically Signed by Lupe Mcrae DO  06/04/22 1446

## 2022-06-09 ENCOUNTER — OFFICE VISIT (OUTPATIENT)
Dept: FAMILY MEDICINE CLINIC | Facility: CLINIC | Age: 52
End: 2022-06-09

## 2022-06-09 VITALS
HEART RATE: 92 BPM | DIASTOLIC BLOOD PRESSURE: 76 MMHG | SYSTOLIC BLOOD PRESSURE: 142 MMHG | WEIGHT: 273 LBS | TEMPERATURE: 98.6 F | HEIGHT: 71 IN | OXYGEN SATURATION: 98 % | BODY MASS INDEX: 38.22 KG/M2

## 2022-06-09 DIAGNOSIS — M25.50 POLYARTHRALGIA: Primary | ICD-10-CM

## 2022-06-09 DIAGNOSIS — M25.461 PAIN AND SWELLING OF KNEE, RIGHT: ICD-10-CM

## 2022-06-09 DIAGNOSIS — Z59.89 DOES NOT HAVE HEALTH INSURANCE: ICD-10-CM

## 2022-06-09 DIAGNOSIS — F51.01 PRIMARY INSOMNIA: ICD-10-CM

## 2022-06-09 DIAGNOSIS — M25.561 PAIN AND SWELLING OF KNEE, RIGHT: ICD-10-CM

## 2022-06-09 PROCEDURE — 99204 OFFICE O/P NEW MOD 45 MIN: CPT | Performed by: STUDENT IN AN ORGANIZED HEALTH CARE EDUCATION/TRAINING PROGRAM

## 2022-06-09 RX ORDER — TRAZODONE HYDROCHLORIDE 50 MG/1
50 TABLET ORAL
Qty: 30 TABLET | Refills: 0 | Status: SHIPPED | OUTPATIENT
Start: 2022-06-09

## 2022-06-09 SDOH — ECONOMIC STABILITY - INCOME SECURITY: OTHER PROBLEMS RELATED TO HOUSING AND ECONOMIC CIRCUMSTANCES: Z59.89

## 2022-06-09 NOTE — PROGRESS NOTES
Assessment/Plan:         Problem List Items Addressed This Visit    None     Visit Diagnoses     Polyarthralgia    -  Primary    Relevant Medications    diclofenac sodium (VOLTAREN) 50 mg EC tablet    Other Relevant Orders    JAY JAY Screen w/ Reflex to Titer/Pattern    TSH, 3rd generation with Free T4 reflex    Ambulatory Referral to Physical Therapy    Comprehensive metabolic panel    CBC and differential    Uric acid    RF Screen w/ Reflex to Titer    JAY JAY Scr, IFA, W/Refl Titer/Pattern/Rheumatoid Arthritis Panel 1    Lyme Antibody Profile with reflex to WB    Pain and swelling of knee, right        Relevant Medications    diclofenac sodium (VOLTAREN) 50 mg EC tablet    Other Relevant Orders    JAY JAY Screen w/ Reflex to Titer/Pattern    TSH, 3rd generation with Free T4 reflex    Ambulatory Referral to Physical Therapy    Comprehensive metabolic panel    CBC and differential    Uric acid    RF Screen w/ Reflex to Titer    JAY JAY Scr, IFA, W/Refl Titer/Pattern/Rheumatoid Arthritis Panel 1    Lyme Antibody Profile with reflex to WB    Primary insomnia        Relevant Medications    traZODone (DESYREL) 50 mg tablet    Does not have health insurance            David differential   Do believe his right knee pain and swelling may be from an intra articular pathology  No physical exam findings overly concerned for meniscal or ligament tear  Review of ER notes and reviewed ER imaging  There is medial compartment arthritis  Recommend anti-inflammatory and ice along with physical therapy    There is family history of rheumatoid arthritis and this may explain multiple joint pains and information at this current time  However it believe it is more that his shoulder and lower back pain may be due to inactivity over the last 60 weeks  Continue with anti-inflammatory in PT and follow-up labs    Subjective:      Patient ID: Renetta Johansen is a 46 y o  male  HPI    Patient common in to establish care and discuss some joint pains  He reports for the last 6-8 weeks he has been dealing with severe right knee pain, back pain, and shoulder pain bilaterally  He believes his right knee pain started after he stepped in a hole and fell  Since then he has been mostly in bed and not moving as much  Has been to the ER twice and had x-rays  Has been given multiple medications with only prednisone provided mild relief  He has also not been sleeping due the pain  Did see physical therapy once recommended Dr  Evaluation    The following portions of the patient's history were reviewed and updated as appropriate:   Past Medical History:  He has no past medical history on file ,  _______________________________________________________________________  Medical Problems:  does not have a problem list on file ,  _______________________________________________________________________  Past Surgical History:   has a past surgical history that includes Rotator cuff repair (Left) and Knee arthroscopy w/ meniscal repair (Bilateral)  ,  _______________________________________________________________________  Family History:  family history includes Alcohol abuse in his father; Breast cancer in his maternal grandmother; Diabetes in his mother; Heart disease in his mother; Rheum arthritis in his mother; Stomach cancer in his father ,  _______________________________________________________________________  Social History:   reports that he has never smoked  He has never used smokeless tobacco  He reports current alcohol use  He reports that he does not use drugs  ,  _______________________________________________________________________  Allergies:  has No Known Allergies     _______________________________________________________________________  Current Outpatient Medications   Medication Sig Dispense Refill    diclofenac sodium (VOLTAREN) 50 mg EC tablet Take 1 tablet (50 mg total) by mouth 2 (two) times a day 60 tablet 5    naproxen sodium (ALEVE) 220 MG tablet Take 220 mg by mouth every 6 (six) hours as needed for mild pain      traZODone (DESYREL) 50 mg tablet Take 1 tablet (50 mg total) by mouth daily at bedtime 30 tablet 0     No current facility-administered medications for this visit      _______________________________________________________________________  Review of Systems   Constitutional: Positive for fatigue  Negative for chills and fever  HENT: Negative for rhinorrhea and sore throat  Eyes: Negative for visual disturbance  Respiratory: Negative for cough and shortness of breath  Cardiovascular: Negative for chest pain and palpitations  Gastrointestinal: Negative for abdominal pain, constipation, diarrhea, nausea and vomiting  Genitourinary: Negative for difficulty urinating, dysuria and frequency  Musculoskeletal: Positive for arthralgias, back pain, gait problem, joint swelling and myalgias  Skin: Negative for color change and rash  Neurological: Negative for weakness and headaches  Hematological: Negative for adenopathy  Does not bruise/bleed easily  Objective:  Vitals:    06/09/22 0849   BP: 142/76   Pulse: 92   Temp: 98 6 °F (37 °C)   SpO2: 98%   Weight: 124 kg (273 lb)   Height: 5' 11" (1 803 m)     Body mass index is 38 08 kg/m²  Physical Exam  Constitutional:       General: He is not in acute distress  Appearance: He is not ill-appearing  HENT:      Head: Normocephalic and atraumatic  Right Ear: External ear normal       Left Ear: External ear normal       Nose: Nose normal  No congestion or rhinorrhea  Mouth/Throat:      Mouth: Mucous membranes are moist       Pharynx: Oropharynx is clear  No oropharyngeal exudate or posterior oropharyngeal erythema  Eyes:      Extraocular Movements: Extraocular movements intact  Conjunctiva/sclera: Conjunctivae normal       Pupils: Pupils are equal, round, and reactive to light  Cardiovascular:      Rate and Rhythm: Normal rate and regular rhythm  Pulses: Normal pulses  Heart sounds: No murmur heard  Pulmonary:      Effort: Pulmonary effort is normal  No respiratory distress  Breath sounds: Normal breath sounds  No wheezing  Chest:      Chest wall: No tenderness  Abdominal:      General: Bowel sounds are normal       Palpations: Abdomen is soft  Tenderness: There is no abdominal tenderness  Musculoskeletal:      Right shoulder: Decreased range of motion  Left shoulder: Decreased range of motion  Cervical back: Normal range of motion  Lumbar back: Decreased range of motion  Positive right straight leg raise test and positive left straight leg raise test       Right knee: Effusion and crepitus present  Decreased range of motion  No LCL laxity, MCL laxity, ACL laxity or PCL laxity  Normal alignment, normal meniscus and normal patellar mobility  Normal pulse  Instability Tests: Anterior drawer test negative  Posterior drawer test negative  Anterior Lachman test negative  Medial Azael test negative and lateral Azael test negative  Left knee: Crepitus present  No LCL laxity, MCL laxity, ACL laxity or PCL laxity  Normal alignment, normal meniscus and normal patellar mobility  Normal pulse  Instability Tests: Anterior drawer test negative  Posterior drawer test negative  Anterior Lachman test negative  Medial Azael test negative and lateral Azael test negative  Skin:     General: Skin is warm and dry  Capillary Refill: Capillary refill takes less than 2 seconds  Findings: No rash  Neurological:      General: No focal deficit present  Mental Status: He is alert  Mental status is at baseline

## 2022-06-13 ENCOUNTER — APPOINTMENT (OUTPATIENT)
Dept: RADIOLOGY | Facility: CLINIC | Age: 52
End: 2022-06-13

## 2022-06-13 ENCOUNTER — OFFICE VISIT (OUTPATIENT)
Dept: OBGYN CLINIC | Facility: CLINIC | Age: 52
End: 2022-06-13

## 2022-06-13 VITALS
RESPIRATION RATE: 18 BRPM | HEIGHT: 71 IN | HEART RATE: 94 BPM | DIASTOLIC BLOOD PRESSURE: 84 MMHG | BODY MASS INDEX: 37.1 KG/M2 | SYSTOLIC BLOOD PRESSURE: 126 MMHG | WEIGHT: 265 LBS

## 2022-06-13 DIAGNOSIS — M54.50 CHRONIC LOW BACK PAIN WITHOUT SCIATICA, UNSPECIFIED BACK PAIN LATERALITY: ICD-10-CM

## 2022-06-13 DIAGNOSIS — M25.552 PAIN IN LEFT HIP: ICD-10-CM

## 2022-06-13 DIAGNOSIS — G89.29 CHRONIC LOW BACK PAIN WITHOUT SCIATICA, UNSPECIFIED BACK PAIN LATERALITY: ICD-10-CM

## 2022-06-13 DIAGNOSIS — M54.50 ACUTE EXACERBATION OF CHRONIC LOW BACK PAIN: ICD-10-CM

## 2022-06-13 DIAGNOSIS — M54.50 CHRONIC LOW BACK PAIN WITHOUT SCIATICA, UNSPECIFIED BACK PAIN LATERALITY: Primary | ICD-10-CM

## 2022-06-13 DIAGNOSIS — M17.11 PRIMARY OSTEOARTHRITIS OF RIGHT KNEE: ICD-10-CM

## 2022-06-13 DIAGNOSIS — G89.29 CHRONIC LOW BACK PAIN WITHOUT SCIATICA, UNSPECIFIED BACK PAIN LATERALITY: Primary | ICD-10-CM

## 2022-06-13 DIAGNOSIS — G89.29 ACUTE EXACERBATION OF CHRONIC LOW BACK PAIN: ICD-10-CM

## 2022-06-13 PROCEDURE — 72110 X-RAY EXAM L-2 SPINE 4/>VWS: CPT

## 2022-06-13 PROCEDURE — 99203 OFFICE O/P NEW LOW 30 MIN: CPT | Performed by: ORTHOPAEDIC SURGERY

## 2022-06-13 NOTE — PROGRESS NOTES
[id number] Card MD  [address] [place] [address]  Stephenie Almaraz [id number]  [phone]    HISTORY OF PRESENT ILLNESS:  Pleasant 46year old male presents to the office for an initial evaluation of low back pain  Patient notes having years of low back pain, stiffness, tightness, and unable to bend  He notes of recent increased low back pain with pain in the bilateral lower extremities and numbness of the bilateral lower extremities  Denies prior treatments including PT, injections, or surgery  He notes today pain in the right lower extremity continues and swelling on the right knee  ALLERGIES: No Known Allergies    MEDICATIONS:    Current Outpatient Medications:     diclofenac sodium (VOLTAREN) 50 mg EC tablet, Take 1 tablet (50 mg total) by mouth 2 (two) times a day, Disp: 60 tablet, Rfl: 5    naproxen sodium (ALEVE) 220 MG tablet, Take 220 mg by mouth every 6 (six) hours as needed for mild pain, Disp: , Rfl:     traZODone (DESYREL) 50 mg tablet, Take 1 tablet (50 mg total) by mouth daily at bedtime, Disp: 30 tablet, Rfl: 0     PAST MEDICAL HISTORY:   History reviewed  No pertinent past medical history  PAST SURGICAL HISTORY:  Past Surgical History:   Procedure Laterality Date    KNEE ARTHROSCOPY W/ MENISCAL REPAIR Bilateral     ROTATOR CUFF REPAIR Left        SOCIAL HISTORY:  Social History     Tobacco Use   Smoking Status Never Smoker   Smokeless Tobacco Never Used        ROS:  Review of Systems   Constitutional: Negative for appetite change, chills, fever and unexpected weight change  HENT: Negative for congestion, hearing loss, nosebleeds, sore throat and trouble swallowing  Eyes: Negative for pain, redness and visual disturbance  Respiratory: Negative for cough, shortness of breath and wheezing  Cardiovascular: Negative for chest pain, palpitations and leg swelling  Gastrointestinal: Negative for abdominal pain, nausea and vomiting     Endocrine: Negative for cold intolerance, heat intolerance, polydipsia and polyuria  Genitourinary: Negative for dysuria and hematuria  Musculoskeletal: Positive for back pain  Negative for gait problem and myalgias  Skin: Negative for rash and wound  Neurological: Negative for dizziness, light-headedness, numbness and headaches  Psychiatric/Behavioral: Negative for decreased concentration, dysphoric mood and suicidal ideas  The patient is not nervous/anxious  PHYSICAL EXAM:   Eyad 46year old male, in no acute distress  Overweight   Antalgic gait right lower extremity   Able to go up on heels and toes   Tender to palpation of lumbar spine   symetric range of motion of bilateral lower extremities   Discomfort of internal rotation of left hip  Sensation intact bilateral extremities       RADIOGRAPHIC STUDIES:  1 X-ray, L-spine, 6/13/22, multilevel lumbar degenerative disc disease, mild to moderate  Degenerative spondylolisthesis at L4-5  Mild to moderate facet disease  2 X-ray, R knee, 6/4/22, medial compartment greater than lateral compartment osteoarthritis  ASSESSMENT:  1  Chronic low back pain without sciatica, unspecified back pain laterality  -     XR spine lumbar minimum 4 views non injury; Future; Expected date: 06/13/2022  -     Ambulatory Referral to Physical Therapy; Future    2  Acute exacerbation of chronic low back pain  -     Ambulatory Referral to Orthopedic Surgery  -     Ambulatory Referral to Physical Therapy; Future    3  Primary osteoarthritis of right knee    4  Pain in left hip  -     Ambulatory Referral to Orthopedic Surgery; Future        PLAN:  Eyad 46year old male with chronic low back pain, DDD of lumbar spine, and OA of right knee  X-rays of the right knee and lumbar spine were reviewed with the patient and his spouse  He does have anxiety which is currently untreated with a history of panic attacks   He was advised to be treated with his PCP as it can amplify his chronic pain  He was prescribed PT for his lumbar spine and was referred to Dr Flores for his right knee OA  We will see the patient back for a repeat evaluation in 2 months  If he continues to have pain then we will obtain a MRI to further assess the source of pain            Scribe Attestation    I,:  Kaelyn Reyna MA am acting as a scribe while in the presence of the attending physician :       I,:  Ulises Patel MD personally performed the services described in this documentation    as scribed in my presence :

## 2022-06-16 ENCOUNTER — OFFICE VISIT (OUTPATIENT)
Dept: FAMILY MEDICINE CLINIC | Facility: CLINIC | Age: 52
End: 2022-06-16
Payer: COMMERCIAL

## 2022-06-16 VITALS
SYSTOLIC BLOOD PRESSURE: 140 MMHG | OXYGEN SATURATION: 99 % | HEIGHT: 71 IN | HEART RATE: 98 BPM | TEMPERATURE: 98.2 F | BODY MASS INDEX: 34.3 KG/M2 | WEIGHT: 245 LBS | DIASTOLIC BLOOD PRESSURE: 80 MMHG

## 2022-06-16 DIAGNOSIS — M25.50 POLYARTHRALGIA: ICD-10-CM

## 2022-06-16 DIAGNOSIS — F41.9 ANXIETY: Primary | ICD-10-CM

## 2022-06-16 DIAGNOSIS — F51.01 PRIMARY INSOMNIA: ICD-10-CM

## 2022-06-16 PROCEDURE — 99214 OFFICE O/P EST MOD 30 MIN: CPT | Performed by: STUDENT IN AN ORGANIZED HEALTH CARE EDUCATION/TRAINING PROGRAM

## 2022-06-16 RX ORDER — DULOXETIN HYDROCHLORIDE 20 MG/1
20 CAPSULE, DELAYED RELEASE ORAL DAILY
Qty: 45 CAPSULE | Refills: 0 | Status: SHIPPED | OUTPATIENT
Start: 2022-06-16 | End: 2022-06-23 | Stop reason: SDUPTHER

## 2022-06-16 NOTE — PROGRESS NOTES
Assessment/Plan:         Problem List Items Addressed This Visit    None     Visit Diagnoses     Anxiety    -  Primary    Relevant Medications    DULoxetine (CYMBALTA) 20 mg capsule    Polyarthralgia        Primary insomnia            Will try Cymbalta  He will follow-up labs for polyarthralgia as well    Subjective:      Patient ID: Sheryle Harbour is a 46 y o  male  HPI    Patient coming in today to follow-up on anxiety  States he has had anxiety most of his life  Was complicated several years ago he was hospitalized for chest pain and had a normal cardiac workup  It was also complicated that time as he was using cocaine but states he has not used this since 20/10  He also saw orthopedic surgeon for his back is recommended he optimize his anxiety as well  States his right knee is better but still has some swelling  States he has only used trazodone for sleep once and states it was helpful  He is afraid to use more often as he is afraid of addiction given his past    The following portions of the patient's history were reviewed and updated as appropriate:   Past Medical History:  He has no past medical history on file ,  _______________________________________________________________________  Medical Problems:  does not have a problem list on file ,  _______________________________________________________________________  Past Surgical History:   has a past surgical history that includes Rotator cuff repair (Left) and Knee arthroscopy w/ meniscal repair (Bilateral)  ,  _______________________________________________________________________  Family History:  family history includes Alcohol abuse in his father; Breast cancer in his maternal grandmother; Diabetes in his mother; Heart disease in his mother; Rheum arthritis in his mother; Stomach cancer in his father ,  _______________________________________________________________________  Social History:   reports that he has never smoked   He has never used smokeless tobacco  He reports current alcohol use  He reports that he does not use drugs  ,  _______________________________________________________________________  Allergies:  has No Known Allergies     _______________________________________________________________________  Current Outpatient Medications   Medication Sig Dispense Refill    diclofenac sodium (VOLTAREN) 50 mg EC tablet Take 1 tablet (50 mg total) by mouth 2 (two) times a day 60 tablet 5    DULoxetine (CYMBALTA) 20 mg capsule Take 1 capsule (20 mg total) by mouth daily 45 capsule 0    naproxen sodium (ALEVE) 220 MG tablet Take 220 mg by mouth every 6 (six) hours as needed for mild pain      traZODone (DESYREL) 50 mg tablet Take 1 tablet (50 mg total) by mouth daily at bedtime 30 tablet 0     No current facility-administered medications for this visit      _______________________________________________________________________  Review of Systems   Constitutional: Negative for chills, fatigue and fever  HENT: Negative for rhinorrhea and sore throat  Eyes: Negative for visual disturbance  Respiratory: Negative for cough and shortness of breath  Cardiovascular: Negative for chest pain and palpitations  Gastrointestinal: Negative for abdominal pain, constipation, diarrhea, nausea and vomiting  Genitourinary: Negative for difficulty urinating, dysuria and frequency  Musculoskeletal: Negative for arthralgias and myalgias  Skin: Negative for color change and rash  Neurological: Negative for weakness and headaches  Psychiatric/Behavioral: Positive for sleep disturbance  Objective:  Vitals:    06/16/22 1321   BP: 140/80   Pulse: 98   Temp: 98 2 °F (36 8 °C)   SpO2: 99%   Weight: 111 kg (245 lb)   Height: 5' 11" (1 803 m)     Body mass index is 34 17 kg/m²  Physical Exam  Constitutional:       General: He is not in acute distress  Appearance: Normal appearance  HENT:      Head: Normocephalic and atraumatic  Pulmonary:      Effort: Pulmonary effort is normal  No respiratory distress  Neurological:      Mental Status: He is alert and oriented to person, place, and time  Mental status is at baseline     Psychiatric:         Mood and Affect: Mood normal          Behavior: Behavior normal

## 2022-06-20 ENCOUNTER — TELEPHONE (OUTPATIENT)
Dept: FAMILY MEDICINE CLINIC | Facility: CLINIC | Age: 52
End: 2022-06-20

## 2022-06-20 NOTE — TELEPHONE ENCOUNTER
T/c from pt - pt feels that his medicine (diclofenac sodium (VOLTAREN) 50 mg EC tablet) is not working at all for his hands for the past 2 days - hands are swollen and painful / pt wondering if he can increase dose   Please advise

## 2022-06-23 ENCOUNTER — APPOINTMENT (OUTPATIENT)
Dept: LAB | Facility: HOSPITAL | Age: 52
End: 2022-06-23
Payer: COMMERCIAL

## 2022-06-23 DIAGNOSIS — M25.561 PAIN AND SWELLING OF KNEE, RIGHT: ICD-10-CM

## 2022-06-23 DIAGNOSIS — F41.9 ANXIETY: ICD-10-CM

## 2022-06-23 DIAGNOSIS — M25.50 POLYARTHRALGIA: ICD-10-CM

## 2022-06-23 DIAGNOSIS — M25.461 PAIN AND SWELLING OF KNEE, RIGHT: ICD-10-CM

## 2022-06-23 LAB
ALBUMIN SERPL BCP-MCNC: 2.4 G/DL (ref 3.5–5)
ALP SERPL-CCNC: 213 U/L (ref 46–116)
ALT SERPL W P-5'-P-CCNC: 228 U/L (ref 12–78)
ANION GAP SERPL CALCULATED.3IONS-SCNC: 13 MMOL/L (ref 4–13)
AST SERPL W P-5'-P-CCNC: 64 U/L (ref 5–45)
BASOPHILS # BLD MANUAL: 0.1 THOUSAND/UL (ref 0–0.1)
BASOPHILS NFR MAR MANUAL: 1 % (ref 0–1)
BILIRUB SERPL-MCNC: 0.52 MG/DL (ref 0.2–1)
BUN SERPL-MCNC: 16 MG/DL (ref 5–25)
CALCIUM ALBUM COR SERPL-MCNC: 10.7 MG/DL (ref 8.3–10.1)
CALCIUM SERPL-MCNC: 9.4 MG/DL (ref 8.3–10.1)
CHLORIDE SERPL-SCNC: 102 MMOL/L (ref 100–108)
CO2 SERPL-SCNC: 25 MMOL/L (ref 21–32)
CREAT SERPL-MCNC: 0.91 MG/DL (ref 0.6–1.3)
EOSINOPHIL # BLD MANUAL: 0 THOUSAND/UL (ref 0–0.4)
EOSINOPHIL NFR BLD MANUAL: 0 % (ref 0–6)
ERYTHROCYTE [DISTWIDTH] IN BLOOD BY AUTOMATED COUNT: 13.5 % (ref 11.6–15.1)
GFR SERPL CREATININE-BSD FRML MDRD: 95 ML/MIN/1.73SQ M
GLUCOSE P FAST SERPL-MCNC: 100 MG/DL (ref 65–99)
HCT VFR BLD AUTO: 36.1 % (ref 36.5–49.3)
HGB BLD-MCNC: 11.2 G/DL (ref 12–17)
LYMPHOCYTES # BLD AUTO: 1.26 THOUSAND/UL (ref 0.6–4.47)
LYMPHOCYTES # BLD AUTO: 13 % (ref 14–44)
MCH RBC QN AUTO: 26.4 PG (ref 26.8–34.3)
MCHC RBC AUTO-ENTMCNC: 31 G/DL (ref 31.4–37.4)
MCV RBC AUTO: 85 FL (ref 82–98)
METAMYELOCYTES NFR BLD MANUAL: 1 % (ref 0–1)
MONOCYTES # BLD AUTO: 0.58 THOUSAND/UL (ref 0–1.22)
MONOCYTES NFR BLD: 6 % (ref 4–12)
NEUTROPHILS # BLD MANUAL: 7.68 THOUSAND/UL (ref 1.85–7.62)
NEUTS SEG NFR BLD AUTO: 79 % (ref 43–75)
PLATELET # BLD AUTO: 660 THOUSANDS/UL (ref 149–390)
PLATELET BLD QL SMEAR: ABNORMAL
PMV BLD AUTO: 9.2 FL (ref 8.9–12.7)
POTASSIUM SERPL-SCNC: 4.2 MMOL/L (ref 3.5–5.3)
PROT SERPL-MCNC: 8.3 G/DL (ref 6.4–8.2)
RBC # BLD AUTO: 4.25 MILLION/UL (ref 3.88–5.62)
SODIUM SERPL-SCNC: 140 MMOL/L (ref 136–145)
TSH SERPL DL<=0.05 MIU/L-ACNC: 1.49 UIU/ML (ref 0.45–4.5)
URATE SERPL-MCNC: 4.9 MG/DL (ref 4.2–8)
WBC # BLD AUTO: 9.72 THOUSAND/UL (ref 4.31–10.16)

## 2022-06-23 PROCEDURE — 86038 ANTINUCLEAR ANTIBODIES: CPT

## 2022-06-23 PROCEDURE — 80053 COMPREHEN METABOLIC PANEL: CPT

## 2022-06-23 PROCEDURE — 84443 ASSAY THYROID STIM HORMONE: CPT

## 2022-06-23 PROCEDURE — 36415 COLL VENOUS BLD VENIPUNCTURE: CPT

## 2022-06-23 PROCEDURE — 86618 LYME DISEASE ANTIBODY: CPT

## 2022-06-23 PROCEDURE — 85007 BL SMEAR W/DIFF WBC COUNT: CPT

## 2022-06-23 PROCEDURE — 84550 ASSAY OF BLOOD/URIC ACID: CPT

## 2022-06-23 PROCEDURE — 86430 RHEUMATOID FACTOR TEST QUAL: CPT

## 2022-06-23 PROCEDURE — 85027 COMPLETE CBC AUTOMATED: CPT

## 2022-06-23 RX ORDER — DULOXETIN HYDROCHLORIDE 20 MG/1
20 CAPSULE, DELAYED RELEASE ORAL DAILY
Qty: 45 CAPSULE | Refills: 0 | Status: SHIPPED | OUTPATIENT
Start: 2022-06-23 | End: 2022-08-07

## 2022-06-23 NOTE — TELEPHONE ENCOUNTER
T/c from pt's wife -- Would like RX for Cymbalta sent to [facility] (cheaper then [facility])   Also wife stated that pt is having more pain in hands and fingers and is wondering if Dr Seymour Champagne would be able to call pt   to discuss medications       Please advise

## 2022-06-24 ENCOUNTER — TELEPHONE (OUTPATIENT)
Dept: FAMILY MEDICINE CLINIC | Facility: CLINIC | Age: 52
End: 2022-06-24

## 2022-06-24 ENCOUNTER — EVALUATION (OUTPATIENT)
Dept: PHYSICAL THERAPY | Age: 52
End: 2022-06-24

## 2022-06-24 DIAGNOSIS — G89.29 CHRONIC LOW BACK PAIN WITHOUT SCIATICA, UNSPECIFIED BACK PAIN LATERALITY: Primary | ICD-10-CM

## 2022-06-24 DIAGNOSIS — R79.89 ELEVATED PLATELET COUNT: ICD-10-CM

## 2022-06-24 DIAGNOSIS — G89.29 ACUTE EXACERBATION OF CHRONIC LOW BACK PAIN: ICD-10-CM

## 2022-06-24 DIAGNOSIS — R93.1 ELEVATED CORONARY ARTERY CALCIUM SCORE: ICD-10-CM

## 2022-06-24 DIAGNOSIS — R74.8 ELEVATED LIVER ENZYMES: Primary | ICD-10-CM

## 2022-06-24 DIAGNOSIS — M54.50 CHRONIC LOW BACK PAIN WITHOUT SCIATICA, UNSPECIFIED BACK PAIN LATERALITY: Primary | ICD-10-CM

## 2022-06-24 DIAGNOSIS — D64.9 LOW HEMATOCRIT: ICD-10-CM

## 2022-06-24 DIAGNOSIS — M54.50 ACUTE EXACERBATION OF CHRONIC LOW BACK PAIN: ICD-10-CM

## 2022-06-24 LAB
B BURGDOR IGG+IGM SER-ACNC: <0.2 AI
RHEUMATOID FACT SER QL LA: NEGATIVE
RYE IGE QN: NEGATIVE

## 2022-06-24 PROCEDURE — 97113 AQUATIC THERAPY/EXERCISES: CPT | Performed by: PHYSICAL THERAPIST

## 2022-06-24 PROCEDURE — 97162 PT EVAL MOD COMPLEX 30 MIN: CPT | Performed by: PHYSICAL THERAPIST

## 2022-06-24 NOTE — PROGRESS NOTES
PT Evaluation     Today's date: 2022  Patient name: Erlinda Manzanares  : 1970  MRN: [id number]  Referring provider: Kurt Orozco MD  Dx:   Encounter Diagnosis     ICD-10-CM    1  Chronic low back pain without sciatica, unspecified back pain laterality  M54 50     G89 29        Start Time: 0800  Stop Time: 0900  Total time in clinic (min): 60 minutes    Assessment  Assessment details: Erlinda Manzanares is a 46 y o  male who presents with pain, decreased strength, decreased ROM, ambulatory dysfunction and postural dysfunction  Due to these impairments, Patient has difficulty performing a/iadls and work-related activities  Patient's clinical presentation is consistent with their referring diagnosis of lumbar radiculopathy  Patient would benefit from skilled physical therapy to address their aforementioned impairments, improve their level of function and to improve their overall quality of life  Impairments: abnormal gait, abnormal muscle tone, abnormal or restricted ROM, abnormal movement, activity intolerance, impaired physical strength, lacks appropriate home exercise program, pain with function, weight-bearing intolerance, poor posture  and poor body mechanics  Understanding of Dx/Px/POC: good   Prognosis: fair    Goals  ST-3 WEEKS  1  Decrease pain by 2 points on VAS at its worst   2   Increase ROM by > 5 deg in all deficients planes  3   Increase CORE/LE by 1/2 MMT grade in all deficient planes  LT-6 WEEKS  1  Patient to be independent with a/iadls especially with walking and bending  2  Increase functional activities for leisure and home activities to previous LOF    3  Independent with HEP and/or fitness program     Plan  Plan details: Patient was evaluated and treated with a 1x visit and will continue with our step down independent pool program due to self pay status  Patient would benefit from: skilled physical therapy  Planned modality interventions: cryotherapy, electrical stimulation/Russian stimulation, thermotherapy: hydrocollator packs and unattended electrical stimulation  Planned therapy interventions: activity modification, behavior modification, body mechanics training, aquatic therapy, flexibility, functional ROM exercises, home exercise program, IADL retraining, joint mobilization, manual therapy, neuromuscular re-education, patient education, postural training, strengthening, stretching, therapeutic activities and therapeutic exercise  Frequency: 2x week (2-3x week)  Duration in weeks: 1  Plan of Care beginning date: 2022  Plan of Care expiration date: 2022  Treatment plan discussed with: patient        Subjective Evaluation    History of Present Illness  Date of onset: 2022  Mechanism of injury: Awoke with bilateral leg pain tried landed PT with no changes and now referred to aquatic therapy, complains of right leg pain and back pain also has right OA and is not working as as donald          Not a recurrent problem   Quality of life: good    Pain  Current pain ratin  At best pain ratin  At worst pain ratin  Quality: radiating and burning  Relieving factors: rest  Aggravating factors: standing, walking, stair climbing, lifting and sitting  Progression: no change    Social Support  Steps to enter house: yes  Stairs in house: yes   Lives in: multiple-level home  Lives with: spouse      Diagnostic Tests  X-ray: abnormal  Treatments  Previous treatment: chiropractic and physical therapy  Patient Goals  Patient goals for therapy: decreased pain, increased motion, increased strength and independence with ADLs/IADLs          Objective     Static Posture     Lumbar Spine   Increased lordosis  Knee   Genu varus  Palpation   Left   Hypertonic in the erector spinae  Right   Hypertonic in the erector spinae and lumbar paraspinals  Tenderness     Lumbar Spine  Tenderness in the spinous process  Right Hip   Tenderness in the PSIS       Active Range of Motion   Cervical/Thoracic Spine       Thoracic    Extension:  Restriction level: moderate    Lumbar   Flexion: 50 degrees  with pain  Extension: 15 degrees   Left lateral flexion: 25 degrees       Right lateral flexion: 20 degrees     Strength/Myotome Testing     Right Hip   Planes of Motion   Flexion: 4  Extension: 4  Abduction: 4+  Adduction: 4+    Left Knee   Extension: 4+    Right Knee   Flexion: 4+  Extension: 4    Right Ankle/Foot   Dorsiflexion: 4    Additional Strength Details  Core is 3/5    Tests     Lumbar     Right   Positive passive SLR and slump test      Ambulation     Observational Gait   Gait: antalgic   Decreased walking speed and stride length       Functional Assessment        Single Leg Stance   Left: 11 seconds  Right: 10 seconds        Precautions:     Daily Treatment Diary     Manual                                                                                   Exercise Diary  6/24            Water walking 5            Postural training             Gait training             Home exercise pgm/patient education             Wall: t/h raises 1            Hip abd/add 2            Marching 1            squats 1            Knee flex/ext             Step-ups (fwd/bkwd/ss)             SLS (eyes open/closed)             SLS w UE mvmt  AROM/ball toss             Weight shifting             UE Noodle work x 4              UE AROM             Resistive UE work (paddles, bells, TB)             Core work on noodle (sitting/stdg)             Sit on noodle with movement             Seated on pool bench w proper posture 1            Ankle df/pf 1            marching 1            Hip Ab/add 1            Knee flex/ext 1            Deep water mvmt 5            Deep water tx/stretching 5            Specific self - stretches wall/steps 5                Modalities  6/24            whirlpool 5

## 2022-07-05 ENCOUNTER — OFFICE VISIT (OUTPATIENT)
Dept: OBGYN CLINIC | Facility: CLINIC | Age: 52
End: 2022-07-05

## 2022-07-05 ENCOUNTER — APPOINTMENT (OUTPATIENT)
Dept: RADIOLOGY | Facility: CLINIC | Age: 52
End: 2022-07-05

## 2022-07-05 VITALS
WEIGHT: 245 LBS | BODY MASS INDEX: 34.3 KG/M2 | HEIGHT: 71 IN | HEART RATE: 111 BPM | SYSTOLIC BLOOD PRESSURE: 137 MMHG | DIASTOLIC BLOOD PRESSURE: 92 MMHG

## 2022-07-05 DIAGNOSIS — M25.552 PAIN IN LEFT HIP: ICD-10-CM

## 2022-07-05 DIAGNOSIS — M17.11 PRIMARY OSTEOARTHRITIS OF RIGHT KNEE: ICD-10-CM

## 2022-07-05 DIAGNOSIS — M25.561 RIGHT KNEE PAIN, UNSPECIFIED CHRONICITY: ICD-10-CM

## 2022-07-05 DIAGNOSIS — M25.461 EFFUSION OF RIGHT KNEE: ICD-10-CM

## 2022-07-05 DIAGNOSIS — M25.552 LEFT HIP PAIN: Primary | ICD-10-CM

## 2022-07-05 PROCEDURE — 73564 X-RAY EXAM KNEE 4 OR MORE: CPT

## 2022-07-05 PROCEDURE — 99214 OFFICE O/P EST MOD 30 MIN: CPT | Performed by: ORTHOPAEDIC SURGERY

## 2022-07-05 PROCEDURE — 89060 EXAM SYNOVIAL FLUID CRYSTALS: CPT | Performed by: PHYSICIAN ASSISTANT

## 2022-07-05 PROCEDURE — 20610 DRAIN/INJ JOINT/BURSA W/O US: CPT | Performed by: ORTHOPAEDIC SURGERY

## 2022-07-05 PROCEDURE — 73560 X-RAY EXAM OF KNEE 1 OR 2: CPT

## 2022-07-05 PROCEDURE — 89051 BODY FLUID CELL COUNT: CPT | Performed by: PHYSICIAN ASSISTANT

## 2022-07-05 PROCEDURE — 87476 LYME DIS DNA AMP PROBE: CPT | Performed by: PHYSICIAN ASSISTANT

## 2022-07-05 RX ORDER — BUPIVACAINE HYDROCHLORIDE 2.5 MG/ML
2 INJECTION, SOLUTION INFILTRATION; PERINEURAL
Status: COMPLETED | OUTPATIENT
Start: 2022-07-05 | End: 2022-07-05

## 2022-07-05 RX ORDER — METHYLPREDNISOLONE ACETATE 40 MG/ML
2 INJECTION, SUSPENSION INTRA-ARTICULAR; INTRALESIONAL; INTRAMUSCULAR; SOFT TISSUE
Status: COMPLETED | OUTPATIENT
Start: 2022-07-05 | End: 2022-07-05

## 2022-07-05 RX ORDER — LIDOCAINE HYDROCHLORIDE 10 MG/ML
2 INJECTION, SOLUTION INFILTRATION; PERINEURAL
Status: COMPLETED | OUTPATIENT
Start: 2022-07-05 | End: 2022-07-05

## 2022-07-05 RX ADMIN — LIDOCAINE HYDROCHLORIDE 2 ML: 10 INJECTION, SOLUTION INFILTRATION; PERINEURAL at 16:32

## 2022-07-05 RX ADMIN — BUPIVACAINE HYDROCHLORIDE 2 ML: 2.5 INJECTION, SOLUTION INFILTRATION; PERINEURAL at 16:32

## 2022-07-05 RX ADMIN — METHYLPREDNISOLONE ACETATE 2 ML: 40 INJECTION, SUSPENSION INTRA-ARTICULAR; INTRALESIONAL; INTRAMUSCULAR; SOFT TISSUE at 16:32

## 2022-07-05 NOTE — PROGRESS NOTES
Patient Name:  Charissa Cottrell  MRN:  [id number]    [address] [place], [place] [address]     1  Left hip pain  -     XR hip/pelv 2-3 vws left if performed; Future; Expected date: 07/05/2022    2  Pain in left hip  -     Ambulatory Referral to Orthopedic Surgery    3  Right knee pain, unspecified chronicity  -     XR knee 4+ vw right injury; Future; Expected date: 07/05/2022  -     XR knee 1 or 2 vw left; Future; Expected date: 07/05/2022    4  Effusion of right knee  -     Lyme disease, PCR; Future  -     Synovial fluid, crystal; Future  -     Synovial fluid white cell count w/ diff; Future  -     Large joint arthrocentesis: R knee  -     Lyme disease, PCR  -     Synovial fluid, crystal  -     Synovial fluid white cell count w/ diff    5  Primary osteoarthritis of right knee  -     Large joint arthrocentesis: R knee        46 y o  male with Right knee osteoarthritis  X-rays reviewed in office today with patient  In depth discussion had with patient regarding continued conservative management of Right knee osteoarthritis and effusion including OTC oral analgesics, topical analgesics, formal outpatient physical therapy for strengthening of quads, hamstrings, hip abductors, ice application, bracing, aspiration with corticosteroid injection  Patient verbalized understanding of the above and would like to proceed forward with aspiration and corticosteroid injection  Risks and benefits of aspiration and corticosteroid injection were discussed in detail  Risks including:  Post injection pain, elevation in blood sugar, skin discoloration, fatty atrophy, and infection were discussed in detail  The patient understood and elected to proceed forward  After sterile preparation the Right knee was aspirated and 30cc of yellow fluid were removed; sent for Lyme, white count, crystals  This was followed by  injection with 2 cc of 1% lidocaine, 2 cc of 0 25% bupivacaine, and 2 cc of Depo-Medrol    The patient tolerated the procedure and no immediate complications were noted  The patient was instructed to ice and elevate the injection site, limit strenuous activity for the next 24-48 hours, and contact us if there were any questions or concerns prior to their follow-up appointment  They were also instructed to contact their primary care physician to discuss elevated blood sugar  I will see the patient back in 8 weeks for reevaluation  Pending results of aspiration, can consider referral to PCP to discuss gout medications  Chief Complaint     Right knee pain    History of the Present Illness     Yue Mckeon is a 46 y o  male with Right knee pain ongoing for 1-2 months ago  He states he woke up one morning without feeling in his Right leg  Admits to improvement of sensation, but worsening of Right knee pain and swelling  He admits to history of surgical intervention of bilateral knees, >5 years ago at [facility], but does not recall the procedure or the dates of surgical intervention   Patient admits to swelling of Right knee with pain during deep knee flexion  Over time, he noted improvement of pain, but continued swelling  He denies history of gout and was recently tested  Patient currently sees Dr Rakel Knapp for his lumbar spine and attending aqua therapy  Review of Systems     Review of Systems   Constitutional: Negative for chills and fever  HENT: Negative for ear pain and sore throat  Eyes: Negative for pain and visual disturbance  Respiratory: Negative for cough and shortness of breath  Cardiovascular: Negative for chest pain and palpitations  Gastrointestinal: Negative for abdominal pain and vomiting  Genitourinary: Negative for dysuria and hematuria  Musculoskeletal: Negative for arthralgias and back pain  Skin: Negative for color change and rash  Neurological: Negative for seizures and syncope  All other systems reviewed and are negative        Physical Exam     /92   Pulse (!) 111   Ht 5' 11" (1 803 m) Wt 111 kg (245 lb)   BMI 34 17 kg/m²     Right Knee  Range of motion from 3 to 90 degrees with pain at end range  There is no crepitus with range of motion  There is moderate effusion  There is no tenderness over the medial or lateral joint line  There is 5/5 quadriceps strength and preserved tone  The patient is able to perform a straight leg raise  negative patellar grind test   Anterior drawer tests is negative  negative Lachman Test    Posterior drawer test is   negative   Varus stress testing reveals no pain or laxity at 0 and 30 degrees   Valgus stress testing reveals no pain or laxity at 0 and 30 degrees  Azael's testing is negative   The patient is neurovascular intact distally  Eyes:  Anicteric sclerae  Neck:  Supple  Lungs:  Normal respiratory effort  Cardiovascular:  Capillary refill is less than 2 seconds  Skin:  Intact without erythema  Neurologic:  Sensation grossly intact to light touch  Psychiatric:  Mood and affect are appropriate  Data Review     I have personally reviewed pertinent films in PACS, and my interpretation follows:    X-rays taken 07/05/2022 of Right knee demonstrates moderate to severe medial compartment degenerative changes and joint space narrowing  Mild degenerative changes of patellofemoral compartment  History reviewed  No pertinent past medical history      Past Surgical History:   Procedure Laterality Date    KNEE ARTHROSCOPY W/ MENISCAL REPAIR Bilateral     ROTATOR CUFF REPAIR Left        No Known Allergies    Current Outpatient Medications on File Prior to Visit   Medication Sig Dispense Refill    traZODone (DESYREL) 50 mg tablet Take 1 tablet (50 mg total) by mouth daily at bedtime 30 tablet 0    diclofenac sodium (VOLTAREN) 50 mg EC tablet Take 1 tablet (50 mg total) by mouth 2 (two) times a day (Patient not taking: Reported on 7/5/2022) 60 tablet 5    DULoxetine (CYMBALTA) 20 mg capsule Take 1 capsule (20 mg total) by mouth daily (Patient not taking: Reported on 7/5/2022) 45 capsule 0    naproxen sodium (ALEVE) 220 MG tablet Take 220 mg by mouth every 6 (six) hours as needed for mild pain (Patient not taking: Reported on 7/5/2022)       No current facility-administered medications on file prior to visit         Social History     Tobacco Use    Smoking status: Never Smoker    Smokeless tobacco: Never Used   Vaping Use    Vaping Use: Never used   Substance Use Topics    Alcohol use: Yes     Comment: sobor x1 mo 5/2022    Drug use: No       Family History   Problem Relation Age of Onset    Diabetes Mother     Heart disease Mother     Rheum arthritis Mother     Alcohol abuse Father     Stomach cancer Father     Breast cancer Maternal Grandmother              Procedures Performed     Large joint arthrocentesis: R knee  Universal Protocol:  Consent given by: patient  Patient identity confirmed: verbally with patient    Procedure Details  Location: knee - R knee  Needle size: 22 G  Approach: lateral  Medications administered: 2 mL bupivacaine 0 25 %; 2 mL lidocaine 1 %; 2 mL methylPREDNISolone acetate 40 mg/mL    Aspirate amount: 30 mL  Aspirate: yellow    Patient tolerance: patient tolerated the procedure well with no immediate complications  Dressing:  Sterile dressing applied              Sara Donovan PA-C

## 2022-07-07 LAB
CRYSTALS SNV QL MICRO: NORMAL
LYMPHOCYTES # SNV MANUAL: 7 %
MONOCYTES NFR SNV MANUAL: 13 %
NEUTROPHILS NFR SNV MANUAL: 76 %
NEUTS BAND NFR SNV: 4 %
SITE: ABNORMAL
TOTAL CELLS COUNTED SPEC: 100
WBC # FLD MANUAL: ABNORMAL /UL (ref 0–200)

## 2022-07-12 LAB — B BURGDOR DNA SPEC QL NAA+PROBE: NEGATIVE

## 2022-08-17 ENCOUNTER — OFFICE VISIT (OUTPATIENT)
Dept: FAMILY MEDICINE CLINIC | Facility: CLINIC | Age: 52
End: 2022-08-17

## 2022-08-17 VITALS
TEMPERATURE: 100.1 F | WEIGHT: 267 LBS | BODY MASS INDEX: 37.38 KG/M2 | HEART RATE: 119 BPM | HEIGHT: 71 IN | OXYGEN SATURATION: 98 % | SYSTOLIC BLOOD PRESSURE: 122 MMHG | DIASTOLIC BLOOD PRESSURE: 74 MMHG

## 2022-08-17 DIAGNOSIS — M25.50 POLYARTHRALGIA: Primary | ICD-10-CM

## 2022-08-17 DIAGNOSIS — F51.01 PRIMARY INSOMNIA: ICD-10-CM

## 2022-08-17 DIAGNOSIS — F41.9 ANXIETY: ICD-10-CM

## 2022-08-17 PROCEDURE — 99214 OFFICE O/P EST MOD 30 MIN: CPT | Performed by: STUDENT IN AN ORGANIZED HEALTH CARE EDUCATION/TRAINING PROGRAM

## 2022-08-17 RX ORDER — PREDNISONE 10 MG/1
10 TABLET ORAL DAILY
Qty: 7 TABLET | Refills: 0 | Status: SHIPPED | OUTPATIENT
Start: 2022-08-17 | End: 2022-08-24

## 2022-08-17 RX ORDER — CITALOPRAM 10 MG/1
10 TABLET ORAL DAILY
Qty: 45 TABLET | Refills: 0 | Status: SHIPPED | OUTPATIENT
Start: 2022-08-17

## 2022-08-19 NOTE — PROGRESS NOTES
Assessment/Plan:         Problem List Items Addressed This Visit    None     Visit Diagnoses     Polyarthralgia    -  Primary    Relevant Medications    predniSONE 10 mg tablet    Other Relevant Orders    Ambulatory Referral to Rheumatology    Primary insomnia        Anxiety        Relevant Medications    citalopram (CeleXA) 10 mg tablet        Change from cymbalta to celexa  Given the recurrent joint pains, swelling, and distal locations with a possibly family hx, high suspicion for Rheumatological process  Subjective:      Patient ID: Victor Hugo Jason is a 46 y o  male  HPI    Coming in to follow up  His hands and wrist have become painful and swollen again  Symptoms resolved with voltaren but symptoms returned shortly after finishing the course  Knees have also become more swollen and has a follow up with ortho  Labs thus far un remarkable, lyme negative, imaging showing arthritic changes  Stopped cymbalta as it made him feel weird  Also stopped trazadone as it was not helping with sleep    The following portions of the patient's history were reviewed and updated as appropriate:   Past Medical History:  He has no past medical history on file ,  _______________________________________________________________________  Medical Problems:  does not have a problem list on file ,  _______________________________________________________________________  Past Surgical History:   has a past surgical history that includes Rotator cuff repair (Left) and Knee arthroscopy w/ meniscal repair (Bilateral)  ,  _______________________________________________________________________  Family History:  family history includes Alcohol abuse in his father; Breast cancer in his maternal grandmother; Diabetes in his mother; Heart disease in his mother; Rheum arthritis in his mother; Stomach cancer in his father ,  _______________________________________________________________________  Social History:   reports that he has never smoked  He has never used smokeless tobacco  He reports current alcohol use  He reports that he does not use drugs  ,  _______________________________________________________________________  Allergies:  has No Known Allergies     _______________________________________________________________________  Current Outpatient Medications   Medication Sig Dispense Refill    citalopram (CeleXA) 10 mg tablet Take 1 tablet (10 mg total) by mouth daily 45 tablet 0    predniSONE 10 mg tablet Take 1 tablet (10 mg total) by mouth daily for 7 days 7 tablet 0     No current facility-administered medications for this visit      _______________________________________________________________________  Review of Systems   Constitutional: Positive for fatigue  Negative for chills and fever  HENT: Negative for rhinorrhea and sore throat  Eyes: Negative for visual disturbance  Respiratory: Negative for cough and shortness of breath  Cardiovascular: Negative for chest pain and palpitations  Gastrointestinal: Negative for abdominal pain, constipation, diarrhea, nausea and vomiting  Genitourinary: Negative for difficulty urinating, dysuria and frequency  Musculoskeletal: Positive for arthralgias, back pain, gait problem, joint swelling and myalgias  Skin: Negative for color change and rash  Neurological: Negative for weakness and headaches  Hematological: Negative for adenopathy  Does not bruise/bleed easily  Psychiatric/Behavioral: Positive for sleep disturbance  Objective:  Vitals:    08/17/22 1531   BP: 122/74   BP Location: Left arm   Patient Position: Sitting   Cuff Size: Large   Pulse: (!) 119   Temp: 100 1 °F (37 8 °C)   SpO2: 98%   Weight: 121 kg (267 lb)   Height: 5' 11" (1 803 m)     Body mass index is 37 24 kg/m²  Physical Exam  Constitutional:       General: He is not in acute distress  Appearance: He is not ill-appearing  HENT:      Head: Normocephalic and atraumatic        Right Ear: External ear normal       Left Ear: External ear normal       Nose: Nose normal  No congestion or rhinorrhea  Mouth/Throat:      Mouth: Mucous membranes are moist       Pharynx: Oropharynx is clear  No oropharyngeal exudate or posterior oropharyngeal erythema  Eyes:      Extraocular Movements: Extraocular movements intact  Conjunctiva/sclera: Conjunctivae normal       Pupils: Pupils are equal, round, and reactive to light  Cardiovascular:      Rate and Rhythm: Normal rate and regular rhythm  Pulses: Normal pulses  Heart sounds: No murmur heard  Pulmonary:      Effort: Pulmonary effort is normal  No respiratory distress  Breath sounds: Normal breath sounds  No wheezing  Chest:      Chest wall: No tenderness  Abdominal:      General: Bowel sounds are normal       Palpations: Abdomen is soft  Tenderness: There is no abdominal tenderness  Musculoskeletal:      Right wrist: Swelling present  Left wrist: Swelling present  Right hand: Swelling present  Left hand: Swelling present  Decreased range of motion  Cervical back: Normal range of motion  Right knee: Swelling present  Left knee: Swelling present  Skin:     General: Skin is warm and dry  Capillary Refill: Capillary refill takes less than 2 seconds  Findings: No rash  Neurological:      General: No focal deficit present  Mental Status: He is alert  Mental status is at baseline

## 2022-08-24 DIAGNOSIS — M25.50 POLYARTHRALGIA: ICD-10-CM

## 2022-08-24 RX ORDER — PREDNISONE 10 MG/1
10 TABLET ORAL DAILY
Qty: 7 TABLET | Refills: 0 | Status: CANCELLED | OUTPATIENT
Start: 2022-08-24 | End: 2022-08-31

## 2022-08-24 NOTE — TELEPHONE ENCOUNTER
Pt's significant other, Lia Mckinley, called and left message on [employer]  Requested refill on pt's predisone 10 mg but it looks like Dr Annmarie Colivn already refilled this on the 17th? Not sure if pt needs new refill or not?

## 2022-08-24 NOTE — TELEPHONE ENCOUNTER
Pt calling asking if he can continue prednisone for another 7 days   Advised Dr Vicky Wong is out - sending to clinical

## 2022-08-28 NOTE — PROGRESS NOTES
Patient Name:  Rachell Portillo  MRN:  [id number]    [address] [place] [address]  Bilateral primary osteoarthritis of knee  -     Injection Procedure Prior Authorization; Future  -     Large joint arthrocentesis: R knee  -     Large joint arthrocentesis: L knee        46 y o  male with bilateral knee osteoarthritis and effusion  In depth discussion had with patient regarding continued conservative management of Bilateral  knee osteoarthritis including OTC oral analgesics, topical analgesics, formal outpatient physical therapy for strengthening of quads, hamstrings, hip abductors, ice application, Medial  bracing, corticosteroid, viscosupplementation injections  Also strongly suggested patient to follow up with PCP and Rheumatology for continued evaluation  Patient verbalized understanding of the above and would like to proceed forward with bilateral knee aspiration and Right Toradol injection and Left corticosteroid injection  Risks and benefits of aspiration and corticosteroid injection were discussed in detail  Risks including: Post injection pain, elevation in blood sugar, skin discoloration, fatty atrophy, and infection were discussed in detail  The patient understood and elected to proceed forward  After sterile preparation the Right knee was aspirated which yielded 25cc of synovial fluid, followed by corticosteroid injection with 2 cc of 1% lidocaine, 2 cc of 0 25% bupivacaine, and 2 cc of Toradol-Medrol  The patient tolerated the procedure and no immediate complications were noted  Risks and benefits of aspiration and corticosteroid injection were discussed in detail  Risks including: Post injection pain, elevation in blood sugar, skin discoloration, fatty atrophy, and infection were discussed in detail  The patient understood and elected to proceed forward    After sterile preparation the Left knee was aspirated which yielded 42cc of straw colored synovial fluid, followed by corticosteroid injection with 2 cc of 1% lidocaine, 2 cc of 0 25% bupivacaine, and 2 cc of Depo-Medrol  The patient tolerated the procedure and no immediate complications were noted  The patient was instructed to ice and elevate the injection site, limit strenuous activity for the next 24-48 hours, and contact us if there were any questions or concerns prior to their follow-up appointment  Placed authorization for Durolane injections of bilateral knees  I will see the patient back if pain persists or worsens for likely aspiration and visco injections  History of the Present Illness   Ambrose Roland is a 46 y o  male with bilateral knee osteoarthritis s/p Right knee aspiration and corticosteroid injection 07/05/2022  Today, patient reports improvement of Right knee pain since injection, but since swelling has return  He also admits to Left knee pain and swelling since last appointment  He has been seeing his PCP for polyarthralgia and was recommended seeing a rheumatologist          Review of Systems     Review of Systems    Physical Exam     /95   Pulse (!) 109   Ht 5' 11" (1 803 m)   Wt 121 kg (267 lb)   BMI 37 24 kg/m²     Right Knee  Range of motion from 2 to 90 with pain at terminal flexion  There is moderate effusion  There is no tenderness over the medial or lateral joint line  The patient is able to perform a straight leg raise  Positive patellar grind  Varus stress testing reveals no laxity at 0 and 30 degrees   Valgus stress testing reveals no laxity at 0 and 30 degrees  The patient is neurovascular intact distally  Left knee   Range of motion from 2-100 degrees   There is moderate effusion  There is mild tenderness over medial and lateral       Data Review     I have personally reviewed pertinent films in PACS, and my interpretation follows  X-rays taken previosuly of Right knee demonstrates moderate to severe medial compartment degenerative changes and joint space narrowing   Mild degenerative changes of patellofemoral compartment       Social History     Tobacco Use    Smoking status: Never Smoker    Smokeless tobacco: Never Used   Vaping Use    Vaping Use: Never used   Substance Use Topics    Alcohol use: Yes     Comment: tessy x1 mo 5/2022    Drug use: No           Large joint arthrocentesis: R knee  Universal Protocol:  Consent given by: patient  Patient identity confirmed: verbally with patient    Procedure Details  Location: knee - R knee  Needle size: 22 G  Approach: lateral  Medications administered: 2 mL bupivacaine 0 25 %; 2 mL lidocaine 1 %; 30 mg ketorolac 30 mg/mL    Aspirate amount: 25 mL  Aspirate: yellow and cloudy    Patient tolerance: patient tolerated the procedure well with no immediate complications  Dressing:  Sterile dressing applied    Large joint arthrocentesis: L knee  Universal Protocol:  Consent given by: patient  Patient identity confirmed: verbally with patient    Procedure Details  Location: knee - L knee  Needle size: 22 G  Approach: lateral  Medications administered: 2 mL bupivacaine 0 25 %; 2 mL lidocaine 1 %; 2 mL methylPREDNISolone acetate 40 mg/mL    Aspirate amount: 42 mL  Aspirate: yellow, blood-tinged and clear    Patient tolerance: patient tolerated the procedure well with no immediate complications  Dressing:  Sterile dressing applied            Nan Hernandez DO

## 2022-08-29 RX ORDER — PREDNISONE 10 MG/1
10 TABLET ORAL DAILY
Qty: 7 TABLET | Refills: 0 | OUTPATIENT
Start: 2022-08-29 | End: 2022-09-05

## 2022-08-29 NOTE — TELEPHONE ENCOUNTER
Called pt -- states symptoms have improved  Swelling in hands and feet are better, but still not 100%  Can close his right hand, but left hand is still not able to close  Is asking if he should continue the prednisone       Please advise

## 2022-08-30 ENCOUNTER — OFFICE VISIT (OUTPATIENT)
Dept: OBGYN CLINIC | Facility: CLINIC | Age: 52
End: 2022-08-30

## 2022-08-30 VITALS
BODY MASS INDEX: 37.38 KG/M2 | HEIGHT: 71 IN | SYSTOLIC BLOOD PRESSURE: 161 MMHG | HEART RATE: 109 BPM | WEIGHT: 267 LBS | DIASTOLIC BLOOD PRESSURE: 95 MMHG

## 2022-08-30 DIAGNOSIS — M17.0 BILATERAL PRIMARY OSTEOARTHRITIS OF KNEE: Primary | ICD-10-CM

## 2022-08-30 PROCEDURE — 20610 DRAIN/INJ JOINT/BURSA W/O US: CPT | Performed by: ORTHOPAEDIC SURGERY

## 2022-08-30 PROCEDURE — 99214 OFFICE O/P EST MOD 30 MIN: CPT | Performed by: ORTHOPAEDIC SURGERY

## 2022-08-30 RX ORDER — BUPIVACAINE HYDROCHLORIDE 2.5 MG/ML
2 INJECTION, SOLUTION INFILTRATION; PERINEURAL
Status: COMPLETED | OUTPATIENT
Start: 2022-08-30 | End: 2022-08-30

## 2022-08-30 RX ORDER — LIDOCAINE HYDROCHLORIDE 10 MG/ML
2 INJECTION, SOLUTION INFILTRATION; PERINEURAL
Status: COMPLETED | OUTPATIENT
Start: 2022-08-30 | End: 2022-08-30

## 2022-08-30 RX ORDER — METHYLPREDNISOLONE ACETATE 40 MG/ML
2 INJECTION, SUSPENSION INTRA-ARTICULAR; INTRALESIONAL; INTRAMUSCULAR; SOFT TISSUE
Status: COMPLETED | OUTPATIENT
Start: 2022-08-30 | End: 2022-08-30

## 2022-08-30 RX ORDER — KETOROLAC TROMETHAMINE 30 MG/ML
30 INJECTION, SOLUTION INTRAMUSCULAR; INTRAVENOUS
Status: COMPLETED | OUTPATIENT
Start: 2022-08-30 | End: 2022-08-30

## 2022-08-30 RX ADMIN — BUPIVACAINE HYDROCHLORIDE 2 ML: 2.5 INJECTION, SOLUTION INFILTRATION; PERINEURAL at 13:54

## 2022-08-30 RX ADMIN — KETOROLAC TROMETHAMINE 30 MG: 30 INJECTION, SOLUTION INTRAMUSCULAR; INTRAVENOUS at 13:54

## 2022-08-30 RX ADMIN — LIDOCAINE HYDROCHLORIDE 2 ML: 10 INJECTION, SOLUTION INFILTRATION; PERINEURAL at 13:54

## 2022-08-30 RX ADMIN — METHYLPREDNISOLONE ACETATE 2 ML: 40 INJECTION, SUSPENSION INTRA-ARTICULAR; INTRALESIONAL; INTRAMUSCULAR; SOFT TISSUE at 13:54

## 2022-09-07 DIAGNOSIS — M17.0 BILATERAL PRIMARY OSTEOARTHRITIS OF KNEE: Primary | ICD-10-CM

## 2022-10-11 ENCOUNTER — TELEPHONE (OUTPATIENT)
Dept: OBGYN CLINIC | Facility: CLINIC | Age: 52
End: 2022-10-11

## 2022-10-11 NOTE — TELEPHONE ENCOUNTER
lmom for pt that he ns his appts with dr Rios Able for his bl gel inject for his knees and to call us back and let us know if he changed his mind about getting them

## 2022-11-08 ENCOUNTER — TELEPHONE (OUTPATIENT)
Dept: OBGYN CLINIC | Facility: HOSPITAL | Age: 52
End: 2022-11-08

## 2022-11-08 DIAGNOSIS — M06.9 RHEUMATOID ARTHRITIS FLARE (HCC): Primary | ICD-10-CM

## 2022-11-08 NOTE — TELEPHONE ENCOUNTER
Caller: Liliana Hernandez    Doctor: Maribel No    Reason for call: Wondering if patient can get in sooner and be put on a cancellation list  Patient is in a lot of pain, he can  Barley walk and move his hands       Call back#: [phone]

## 2022-11-10 ENCOUNTER — TELEPHONE (OUTPATIENT)
Dept: OBGYN CLINIC | Facility: CLINIC | Age: 52
End: 2022-11-10

## 2022-11-10 RX ORDER — PREDNISONE 10 MG/1
TABLET ORAL
Qty: 35 TABLET | Refills: 0 | Status: SHIPPED | OUTPATIENT
Start: 2022-11-10 | End: 2022-12-08

## 2022-11-10 NOTE — TELEPHONE ENCOUNTER
Caller: Antonio     Doctor: Ferdinand Gunderson    Reason for call: Patient called back he would like to  his 12/13 appt up  I did not see any sooner appts  He would like a call back       Call back#: [phone]

## 2022-11-10 NOTE — TELEPHONE ENCOUNTER
We have him on our cancellation list, but you can put him on a prednisone course to calm down his current inflammatory arthritis flare, 20mg daily for a week, 15mg for a week, 10mg for a week, 5mg for a week, then stop

## 2022-11-10 NOTE — TELEPHONE ENCOUNTER
lmom for pt about his visco appt that he cx today and he r/s to 12/13  I just wanted to make sure he wanted to wait that long or if he wanted me to get him a sooner appt per his not on 11/8 he was having increased pain and wanted an appt asap   He may be see sooner if he wants

## 2022-12-13 ENCOUNTER — OFFICE VISIT (OUTPATIENT)
Dept: OBGYN CLINIC | Facility: CLINIC | Age: 52
End: 2022-12-13

## 2022-12-13 VITALS
HEART RATE: 80 BPM | WEIGHT: 267 LBS | HEIGHT: 71 IN | SYSTOLIC BLOOD PRESSURE: 167 MMHG | BODY MASS INDEX: 37.38 KG/M2 | DIASTOLIC BLOOD PRESSURE: 106 MMHG

## 2022-12-13 DIAGNOSIS — M25.562 CHRONIC PAIN OF BOTH KNEES: ICD-10-CM

## 2022-12-13 DIAGNOSIS — M25.561 CHRONIC PAIN OF BOTH KNEES: ICD-10-CM

## 2022-12-13 DIAGNOSIS — M25.461 EFFUSION OF RIGHT KNEE: ICD-10-CM

## 2022-12-13 DIAGNOSIS — G89.29 CHRONIC PAIN OF BOTH KNEES: ICD-10-CM

## 2022-12-13 DIAGNOSIS — M25.462 EFFUSION OF LEFT KNEE: ICD-10-CM

## 2022-12-13 DIAGNOSIS — M17.0 BILATERAL PRIMARY OSTEOARTHRITIS OF KNEE: Primary | ICD-10-CM

## 2022-12-13 RX ORDER — MELOXICAM 15 MG/1
TABLET ORAL
COMMUNITY

## 2022-12-13 RX ORDER — LIDOCAINE HYDROCHLORIDE 10 MG/ML
2 INJECTION, SOLUTION INFILTRATION; PERINEURAL
Status: COMPLETED | OUTPATIENT
Start: 2022-12-13 | End: 2022-12-13

## 2022-12-13 RX ORDER — BUPIVACAINE HYDROCHLORIDE 2.5 MG/ML
2 INJECTION, SOLUTION INFILTRATION; PERINEURAL
Status: COMPLETED | OUTPATIENT
Start: 2022-12-13 | End: 2022-12-13

## 2022-12-13 RX ORDER — DULOXETIN HYDROCHLORIDE 20 MG/1
CAPSULE, DELAYED RELEASE ORAL
COMMUNITY

## 2022-12-13 RX ORDER — LIDOCAINE 50 MG/G
PATCH TOPICAL
COMMUNITY

## 2022-12-13 RX ORDER — TRAZODONE HYDROCHLORIDE 50 MG/1
TABLET ORAL
COMMUNITY

## 2022-12-13 RX ORDER — METHYLPREDNISOLONE ACETATE 40 MG/ML
2 INJECTION, SUSPENSION INTRA-ARTICULAR; INTRALESIONAL; INTRAMUSCULAR; SOFT TISSUE
Status: COMPLETED | OUTPATIENT
Start: 2022-12-13 | End: 2022-12-13

## 2022-12-13 RX ADMIN — BUPIVACAINE HYDROCHLORIDE 2 ML: 2.5 INJECTION, SOLUTION INFILTRATION; PERINEURAL at 11:11

## 2022-12-13 RX ADMIN — METHYLPREDNISOLONE ACETATE 2 ML: 40 INJECTION, SUSPENSION INTRA-ARTICULAR; INTRALESIONAL; INTRAMUSCULAR; SOFT TISSUE at 11:11

## 2022-12-13 RX ADMIN — LIDOCAINE HYDROCHLORIDE 2 ML: 10 INJECTION, SOLUTION INFILTRATION; PERINEURAL at 11:11

## 2022-12-13 NOTE — PROGRESS NOTES
Patient Name:  Eileen Roberts  MRN:  [id number]    [address] [place] [address]     1  Bilateral primary osteoarthritis of knee  -     Large joint arthrocentesis: R knee  -     Large joint arthrocentesis: L knee    2  Effusion of right knee    3  Effusion of left knee    4  Chronic pain of both knees      45 y o  male with Bilateral knee osteoarthritis s/p aspiration and Right knee toradol and Left knee corticosteroid injection on 08/30/2022  In depth conversation had with patient regarding continued nonoperative treatment including aspiration and corticosteroid vs visco injections  At this time, patient would like to move forward with corticosteroid injections as he is hesitant to receive the visco injections;if aspiration is performed would like to also provide injection to decrease recurrent effusion  In addition to injections, advised patient we can also place new order for medial  braces for his knees and provided patient business card to call for appointment for custom fitting  Verbalized understanding and wished to move forward with bilateral knee aspiration and corticosteroid injections  Risks and benefits of aspiration and corticosteroid injection were discussed in detail  Risks including: Post injection pain, elevation in blood sugar, skin discoloration, fatty atrophy, and infection were discussed in detail  The patient understood and elected to proceed forward  After sterile preparation the Right knee was aspirated which yielded 50cc of synovial fluid, followed by corticosteroid injection with 2 cc of 1% lidocaine, 2 cc of 0 25% bupivacaine, and 2 cc of Depo-Medrol  The patient tolerated the procedure and no immediate complications were noted  Risks and benefits of aspiration and corticosteroid injection were discussed in detail  Risks including: Post injection pain, elevation in blood sugar, skin discoloration, fatty atrophy, and infection were discussed in detail    The patient understood and elected to proceed forward  After sterile preparation the Left knee was aspirated which yielded 45cc of synovial fluid, followed by corticosteroid injection with 2 cc of 1% lidocaine, 2 cc of 0 25% bupivacaine, and 2 cc of Depo-Medrol  The patient tolerated the procedure and no immediate complications were noted  The patient was instructed to ice and elevate the injection site, limit strenuous activity for the next 24-48 hours, and contact us if there were any questions or concerns prior to their follow-up appointment  I will see the patient back in 3 months for reevaluation of bilateral knees and consideration of visco injections at that time  Continue follow up with [facility] physician for discussion of recent blood work  History of the Present Illness   Alanis Blas is a 45 y o  male  with Bilateral knee osteoarthritis s/p aspiration and Right knee toradol and Left knee corticosteroid injection on 08/30/2022  Today, patient reports he is doing well s/p last appointment  He recently saw a new doctor at [facility] secondary to multiple joint complaints; he was prescribed Meloxicam which improved his pain and is now able to get up and down off the floor with mild pain  He wishes to hold off on visco injections at this time and only wants aspiration today  Review of Systems     Review of Systems   Constitutional: Negative for chills and fever  HENT: Negative for ear pain and sore throat  Eyes: Negative for pain and visual disturbance  Respiratory: Negative for cough and shortness of breath  Cardiovascular: Negative for chest pain and palpitations  Gastrointestinal: Negative for abdominal pain and vomiting  Genitourinary: Negative for dysuria and hematuria  Musculoskeletal: Negative for arthralgias and back pain  Skin: Negative for color change and rash  Neurological: Negative for seizures and syncope  All other systems reviewed and are negative        Physical Exam     BP (!) 167/106   Pulse 80   Ht 5' 11" (1 803 m)   Wt 121 kg (267 lb)   BMI 37 24 kg/m²     Right Knee  Range of motion from 2 to 90 degrees  There is moderate effusion  There is tenderness over the medial joint line  The patient is able to perform a straight leg raise with 5/5 quad strength  Varus stress testing reveals no instability at 0 and 30 degrees   Valgus stress testing reveals no instability at 0 and 30 degrees  The patient is neurovascular intact distally  Left  Knee  Range of motion from 2 to 90 degrees  There is mild-moderate effusion  There is tenderness over the medial joint line  The patient is able to perform a straight leg raise  Varus stress testing reveals no instability at 0 and 30 degrees   Valgus stress testing reveals no instability at 0 and 30 degrees  The patient is neurovascular intact distally  Data Review     I have personally reviewed pertinent films in PACS, and my interpretation follows  X-rays taken 07/2022 of Right knee demonstrates moderate to severe medial compartment degenerative changes and mild patellofemoral changes  Also visualized Left knee with severe medial compartment degenerative changes and sclerosis  No acute fractures at that time       Social History     Tobacco Use   • Smoking status: Never   • Smokeless tobacco: Never   Vaping Use   • Vaping Use: Never used   Substance Use Topics   • Alcohol use: Yes     Comment: sobor x1 mo 5/2022   • Drug use: No           Large joint arthrocentesis: R knee  Universal Protocol:  Consent given by: patient  Patient identity confirmed: verbally with patient    Procedure Details  Location: knee - R knee  Needle size: 22 G  Approach: lateral  Medications administered: 2 mL bupivacaine 0 25 %; 2 mL lidocaine 1 %; 2 mL methylPREDNISolone acetate 40 mg/mL    Aspirate amount: 50 mL  Aspirate: yellow    Patient tolerance: patient tolerated the procedure well with no immediate complications  Dressing: Sterile dressing applied    Large joint arthrocentesis: L knee  Universal Protocol:  Consent given by: patient  Patient identity confirmed: verbally with patient    Procedure Details  Location: knee - L knee  Needle size: 22 G  Approach: lateral  Medications administered: 2 mL bupivacaine 0 25 %; 2 mL lidocaine 1 %; 2 mL methylPREDNISolone acetate 40 mg/mL    Aspirate amount: 45 mL  Aspirate: yellow    Patient tolerance: patient tolerated the procedure well with no immediate complications  Dressing:  Sterile dressing applied            Sonal Lopez DO

## 2023-04-06 ENCOUNTER — TELEPHONE (OUTPATIENT)
Dept: FAMILY MEDICINE CLINIC | Facility: CLINIC | Age: 53
End: 2023-04-06

## 2023-04-06 NOTE — TELEPHONE ENCOUNTER
T/c from pts wife -- pt wants to switch care from Dr Marshell Denver to [facility], because he needs a more established provider  Please advise

## 2023-04-07 NOTE — TELEPHONE ENCOUNTER
Please advise patient and his wife that a one time switch is permitted, however Justine is not in the office everyday  I do consider Dr Delilah Salguero a well established provider, also

## 2023-04-24 NOTE — PROGRESS NOTES
Assessment and Plan:   *Kendal Felty is a 45 y o   male who presents as a Rheumatology consult referred by Bethany Black MD for evaluation of inflammatory arthritis  Patient's clinical picture is consistent with Rheumatoid arthritis and gout  Admits to 2 hours of morning stiffness; would benefit from at least starting hydroxychloroquine  Will double check his uric acid level before deciding whether he would benefit from chronic urate lowering therapy such as allopurinol; last uric acid level was 8 9  Do labs ASAP  Continue meloxicam daily as needed for joint pain  Start hydroxychloroquine twice a day  Take prednisone 10mg daily for a week, then 5mg daily for a week  Follow gout diet  Bring hand x-ray reports to next visit    Return to clinic in 4 months    Plan:  Diagnoses and all orders for this visit:    Rheumatoid arthritis, involving unspecified site, unspecified whether rheumatoid factor present (HCC)  -     meloxicam (MOBIC) 15 mg tablet; Take 1 tablet (15 mg total) by mouth daily  -     CBC and differential  -     Comprehensive metabolic panel  -     C-reactive protein  -     Sedimentation rate, automated  -     Discontinue: hydroxychloroquine (PLAQUENIL) 200 mg tablet; Take 1 tablet (200 mg total) by mouth 2 (two) times a day  -     Cyclic citrul peptide antibody, IgG  -     predniSONE 5 mg tablet; Take 2 tablets (10 mg total) by mouth daily for 7 days, THEN 1 tablet (5 mg total) daily for 7 days  Polyarthralgia  -     Ambulatory Referral to Rheumatology  -     meloxicam (MOBIC) 15 mg tablet; Take 1 tablet (15 mg total) by mouth daily  -     Uric acid    Gout, unspecified cause, unspecified chronicity, unspecified site  -     meloxicam (MOBIC) 15 mg tablet;  Take 1 tablet (15 mg total) by mouth daily  -     Uric acid    Low vitamin D level  -     Vitamin D 25 hydroxy    High risk medication use  High risk medication use - Benefits and risks of hydroxychloroquine, including but not limited to retinal toxicity, corneal deposits, gastrointestinal side effects, and headaches were discussed with the patient  The need for a regular eye exam to monitor for ocular toxicity while on this medication was also explained to the patient  Follow-up plan: RTC in 4 months        HPI  Felix Pierre is a 45 y o   male who presents as a Rheumatology consult referred by Tano Whitaker MD for evaluation of possible autoimmune disease  A year ago, suddenly started having diffuse joint pain and stiffness  It lasted for some time; was given prednisone, helped some  Returned when he was off prednisone  Was home for 2-3 months  In July 2023, started getting a little better, and feels almost back to baseline except for joints in fingers and feet  Couldn't raise arms, legs, or walk  Hands and feet got swollen  Review of Systems  Review of Systems   Constitutional: Negative for fatigue  HENT: Negative for mouth sores  Respiratory: Negative for cough and shortness of breath  Cardiovascular: Positive for leg swelling  Negative for chest pain  Hypertension   Gastrointestinal: Negative for abdominal pain, constipation and diarrhea  Musculoskeletal: Positive for arthralgias, back pain and myalgias  Negative for joint swelling  Skin: Negative for color change and rash  Neurological: Negative for weakness  Hematological: Negative for adenopathy  Psychiatric/Behavioral: Positive for dysphoric mood  Negative for sleep disturbance  Reviewed and agree      Allergies  No Known Allergies    Home Medications    Current Outpatient Medications:   •  hydroxychloroquine (PLAQUENIL) 200 mg tablet, Take 1 tablet (200 mg total) by mouth 2 (two) times a day with meals, Disp: 180 tablet, Rfl: 1  •  meloxicam (MOBIC) 15 mg tablet, Take 1 tablet (15 mg total) by mouth daily, Disp: 90 tablet, Rfl: 1  •  DULoxetine (CYMBALTA) 20 mg capsule, duloxetine 20 mg capsule,delayed release  take 1 capsule by mouth once daily "(Patient not taking: Reported on 4/26/2023), Disp: , Rfl:   •  lidocaine (LIDODERM) 5 %, lidocaine 5 % topical patch (Patient not taking: Reported on 4/26/2023), Disp: , Rfl:   •  traZODone (DESYREL) 50 mg tablet, trazodone 50 mg tablet (Patient not taking: Reported on 4/26/2023), Disp: , Rfl:     Past Medical History  History reviewed  No pertinent past medical history  Past Surgical History   Past Surgical History:   Procedure Laterality Date   • KNEE ARTHROSCOPY W/ MENISCAL REPAIR Bilateral    • ROTATOR CUFF REPAIR Left        Family History    Family History   Problem Relation Age of Onset   • Diabetes Mother    • Heart disease Mother    • Rheum arthritis Mother    • Alcohol abuse Father    • Stomach cancer Father    • Breast cancer Maternal Grandmother      No known family history of autoimmune or inflammatory diseases  Social History  Occupation: [employer]  Social History     Substance and Sexual Activity   Alcohol Use Yes    Comment: sobor x1 mo 5/2022     Social History     Substance and Sexual Activity   Drug Use No     Social History     Tobacco Use   Smoking Status Never   Smokeless Tobacco Never       Objective:  Vitals:    04/26/23 0838   BP: 132/76   Weight: 121 kg (266 lb 6 4 oz)   Height: 5' 11\" (1 803 m)       Physical Exam  Constitutional:       General: He is not in acute distress  HENT:      Head: Normocephalic and atraumatic  Eyes:      Conjunctiva/sclera: Conjunctivae normal    Cardiovascular:      Rate and Rhythm: Normal rate and regular rhythm  Heart sounds: S1 normal and S2 normal      No friction rub  Pulmonary:      Effort: Pulmonary effort is normal  No respiratory distress  Breath sounds: Normal breath sounds  No wheezing, rhonchi or rales  Musculoskeletal:         General: Tenderness present  Cervical back: Neck supple  Comments: Bilateral 2nd-4th PIP tenderness/synovial hypertrophy   Skin:     General: Skin is warm and dry        Coloration: Skin is " "not pale  Findings: No rash  Neurological:      Mental Status: He is alert  Mental status is at baseline  Psychiatric:         Mood and Affect: Mood normal          Behavior: Behavior normal        Reviewed labs and imaging  Imaging:   XR knee 4+ vw right injury 06/04/2022   Suprapatellar fullness consistent with moderate joint effusion  Degenerative osteoarthritis most pronounced within the medial compartment with joint space narrowing  XR spine lumbar minimum 4 views non injury 06/13/2022  IMPRESSION:   Degenerative disc disease at L4-5 with mild anterolisthesis which remains unchanged on the flexion-extension view   Facet degenerative changes at L4-5 and L5-S1   No acute compression collapse of the vertebra  XR knee 4+ vw right injury 07/05/2022   Tricompartmental osteoarthritis with severe narrowing of the medial tibiofemoral joint space and osteophytes seen in all 3 compartments  XR knee 1 or 2 vw left 07/05/2022   Joint effusion cannot be reliably evaluated without lateral view  There is severe osteoarthritis of the medial joint compartment where there is \"bone on bone\" appearance  Genu varum deformity is noted  Labs:   No visits with results within 6 Month(s) from this visit  Latest known visit with results is:   Office Visit on 07/05/2022   Component Date Value Ref Range Status   • Lyme Disease(B burgdorferi)PCR 07/05/2022 Negative  Negative Final    No B  burgdorferi DNA Detected  This test was developed and its performance characteristics determined  by [employer]   It has not been cleared or approved by the Food and Drug  Administration  The FDA has determined that such clearance or  approval is not necessary     • Crystals, Synovial Fluid 07/05/2022 No Crystals Seen  No Crystals Seen Final   • Site 07/05/2022 Right Knee   Final   • WBC, Fluid 07/05/2022 37,310 (H)  0 - 200 /ul Final   • Total Counted 07/05/2022 100   Final   • Neutrophil % Synovial 07/05/2022 76  % Final   • Band % " Synovial 07/05/2022 4  % Final   • Lymph % Synovial 07/05/2022 7  % Final   • Monocyte % Synovial 07/05/2022 13  % Final

## 2023-04-26 ENCOUNTER — OFFICE VISIT (OUTPATIENT)
Dept: RHEUMATOLOGY | Facility: CLINIC | Age: 53
End: 2023-04-26

## 2023-04-26 ENCOUNTER — TELEPHONE (OUTPATIENT)
Dept: OBGYN CLINIC | Facility: HOSPITAL | Age: 53
End: 2023-04-26

## 2023-04-26 VITALS
HEIGHT: 71 IN | BODY MASS INDEX: 37.3 KG/M2 | WEIGHT: 266.4 LBS | SYSTOLIC BLOOD PRESSURE: 132 MMHG | DIASTOLIC BLOOD PRESSURE: 76 MMHG

## 2023-04-26 DIAGNOSIS — Z79.899 HIGH RISK MEDICATION USE: ICD-10-CM

## 2023-04-26 DIAGNOSIS — R79.89 LOW VITAMIN D LEVEL: ICD-10-CM

## 2023-04-26 DIAGNOSIS — M25.50 POLYARTHRALGIA: ICD-10-CM

## 2023-04-26 DIAGNOSIS — M06.9 RHEUMATOID ARTHRITIS, INVOLVING UNSPECIFIED SITE, UNSPECIFIED WHETHER RHEUMATOID FACTOR PRESENT (HCC): Primary | ICD-10-CM

## 2023-04-26 DIAGNOSIS — M10.9 GOUT, UNSPECIFIED CAUSE, UNSPECIFIED CHRONICITY, UNSPECIFIED SITE: ICD-10-CM

## 2023-04-26 DIAGNOSIS — M06.9 RHEUMATOID ARTHRITIS, INVOLVING UNSPECIFIED SITE, UNSPECIFIED WHETHER RHEUMATOID FACTOR PRESENT (HCC): ICD-10-CM

## 2023-04-26 RX ORDER — HYDROXYCHLOROQUINE SULFATE 200 MG/1
200 TABLET, FILM COATED ORAL 2 TIMES DAILY
Qty: 60 TABLET | Refills: 5 | Status: SHIPPED | OUTPATIENT
Start: 2023-04-26 | End: 2023-04-26

## 2023-04-26 RX ORDER — HYDROXYCHLOROQUINE SULFATE 200 MG/1
200 TABLET, FILM COATED ORAL 2 TIMES DAILY WITH MEALS
Qty: 180 TABLET | Refills: 1 | Status: SHIPPED | OUTPATIENT
Start: 2023-04-26 | End: 2023-07-25

## 2023-04-26 RX ORDER — PREDNISONE 5 MG/1
TABLET ORAL
Qty: 21 TABLET | Refills: 0 | Status: SHIPPED | OUTPATIENT
Start: 2023-04-26 | End: 2023-05-10

## 2023-04-26 RX ORDER — MELOXICAM 15 MG/1
15 TABLET ORAL DAILY
Qty: 90 TABLET | Refills: 1 | Status: SHIPPED | OUTPATIENT
Start: 2023-04-26

## 2023-04-26 NOTE — TELEPHONE ENCOUNTER
Caller: patient    Doctor: Duarte Young    Reason for call: Patient called in stating not all the medications were ready at the pharmacy   Patient stated the pharmacy said they were waiting on confirmation    Call back#: n/a

## 2023-04-26 NOTE — PATIENT INSTRUCTIONS
Do labs today  Continue meloxicam daily as needed for joint pain  Start hydroxychloroquine twice a day  Take prednisone 10mg daily for a week, then 5mg daily for a week  Follow gout diet  Bring x-ray reports to next visit    Return to clinic in 4 months

## 2023-04-26 NOTE — TELEPHONE ENCOUNTER
Called and spoke to pt  The pt was calling re: HCQ  Pt was informed that it was called in to refill just a few minutes ago  Pt verbalized understanding

## 2023-05-14 ENCOUNTER — TELEPHONE (OUTPATIENT)
Dept: RHEUMATOLOGY | Facility: CLINIC | Age: 53
End: 2023-05-14

## 2023-05-15 NOTE — TELEPHONE ENCOUNTER
Called pt and left detailed vm including callback number  [facility]    Progress Note    Leyla Castaneda Patient Status:  Inpatient    1926 MRN [id number]   Location [facility] 5SW/SE Attending Mami Najera MD   Hosp Day # 1 PCP Flavio Farias MD       SUBJECTIVE:  No CP, SO Chloride ER (K-DUR M20) CR tab 40 mEq 40 mEq Oral Q4H   Atorvastatin Calcium (LIPITOR) tab 40 mg 40 mg Oral Nightly   Cholecalciferol (VITAMIN D) 1000 units tab 2,000 Units 2,000 Units Oral Daily   Econazole Nitrate (SPECTAZOLE) cream  [email or web address]

## 2023-06-23 ENCOUNTER — TELEPHONE (OUTPATIENT)
Dept: OBGYN CLINIC | Facility: CLINIC | Age: 53
End: 2023-06-23

## 2023-06-23 NOTE — TELEPHONE ENCOUNTER
LMOM FOR PT THAT HE NS HIS INJECTION APPT WITH DR Tom Humphries AND IF HE WANTS TO R/S TO CALL US BACK

## 2023-10-26 ENCOUNTER — OFFICE VISIT (OUTPATIENT)
Dept: FAMILY MEDICINE CLINIC | Facility: CLINIC | Age: 53
End: 2023-10-26
Payer: COMMERCIAL

## 2023-10-26 VITALS
OXYGEN SATURATION: 99 % | BODY MASS INDEX: 36.68 KG/M2 | TEMPERATURE: 96.6 F | HEIGHT: 71 IN | WEIGHT: 262 LBS | HEART RATE: 90 BPM | DIASTOLIC BLOOD PRESSURE: 102 MMHG | SYSTOLIC BLOOD PRESSURE: 162 MMHG

## 2023-10-26 DIAGNOSIS — Z12.12 SCREENING FOR COLORECTAL CANCER: ICD-10-CM

## 2023-10-26 DIAGNOSIS — R00.2 INTERMITTENT PALPITATIONS: ICD-10-CM

## 2023-10-26 DIAGNOSIS — I10 PRIMARY HYPERTENSION: ICD-10-CM

## 2023-10-26 DIAGNOSIS — Z13.220 SCREENING FOR LIPID DISORDERS: ICD-10-CM

## 2023-10-26 DIAGNOSIS — Z00.00 ANNUAL PHYSICAL EXAM: Primary | ICD-10-CM

## 2023-10-26 DIAGNOSIS — Z12.5 SCREENING FOR PROSTATE CANCER: ICD-10-CM

## 2023-10-26 DIAGNOSIS — Z12.11 SCREENING FOR COLORECTAL CANCER: ICD-10-CM

## 2023-10-26 PROBLEM — M06.09 RHEUMATOID ARTHRITIS OF MULTIPLE SITES WITH NEGATIVE RHEUMATOID FACTOR (HCC): Status: ACTIVE | Noted: 2023-10-26

## 2023-10-26 PROBLEM — M79.642 PAIN IN BOTH HANDS: Status: ACTIVE | Noted: 2022-11-30

## 2023-10-26 PROBLEM — M79.641 PAIN IN BOTH HANDS: Status: ACTIVE | Noted: 2022-11-30

## 2023-10-26 PROCEDURE — 99396 PREV VISIT EST AGE 40-64: CPT | Performed by: PHYSICIAN ASSISTANT

## 2023-10-26 RX ORDER — LISINOPRIL 10 MG/1
10 TABLET ORAL DAILY
Qty: 90 TABLET | Refills: 0 | Status: SHIPPED | OUTPATIENT
Start: 2023-10-26

## 2023-10-26 RX ORDER — LISINOPRIL 10 MG/1
10 TABLET ORAL DAILY
Qty: 30 TABLET | Refills: 2 | Status: SHIPPED | OUTPATIENT
Start: 2023-10-26 | End: 2023-10-26

## 2023-10-26 NOTE — PROGRESS NOTES
[address]    NAME: Jaky Way  AGE: 52 y.o. SEX: male  : 1970     DATE: 10/26/2023     Assessment and Plan:     Problem List Items Addressed This Visit        Cardiovascular and Mediastinum    Primary hypertension    Relevant Medications    lisinopril (ZESTRIL) 10 mg tablet    Other Relevant Orders    ECG 12 lead       Other    Annual physical exam - Primary    Intermittent palpitations    Relevant Orders    ECG 12 lead   Other Visit Diagnoses     Screening for lipid disorders        Relevant Orders    Lipid panel    Screening for colorectal cancer        Relevant Orders    Ambulatory referral to Gastroenterology    Screening for prostate cancer        Relevant Orders    PSA, Total Screen            Immunizations and preventive care screenings were discussed with patient today. Appropriate education was printed on patient's after visit summary. Counseling:  Dental Health: discussed importance of regular tooth brushing, flossing, and dental visits. Injury prevention: discussed safety/seat belts, safety helmets, smoke detectors, carbon dioxide detectors, and smoking near bedding or upholstery. Exercise: the importance of regular exercise/physical activity was discussed. Recommend exercise 3-5 times per week for at least 30 minutes. Depression Screening and Follow-up Plan: Patient was screened for depression during today's encounter. They screened negative with a PHQ-2 score of 0. No follow-ups on file. Chief Complaint:     Chief Complaint   Patient presents with   • Establish Care   • Physical Exam      History of Present Illness:     Adult Annual Physical   Patient here for a comprehensive physical exam. The patient reports problems - high blood pressure, palpitations . Diet and Physical Activity  Diet/Nutrition: well balanced diet. Exercise: no formal exercise. Depression Screening  PHQ-2/9 Depression Screening    Little interest or pleasure in doing things: 0 - not at all  Feeling down, depressed, or hopeless: 0 - not at all  PHQ-2 Score: 0  PHQ-2 Interpretation: Negative depression screen       General Health  Sleep: sleeps poorly. Hearing: normal - bilateral.  Vision: no vision problems and most recent eye exam >1 year ago. Dental: no dental visits for >1 year.  Health  Symptoms include: none    Advanced Care Planning  Do you have an advanced directive? no  Do you have a durable medical power of ? no     Review of Systems:     Review of Systems   Constitutional:  Negative for appetite change, fatigue, fever and unexpected weight change. HENT:  Negative for dental problem, ear pain, hearing loss, mouth sores, nosebleeds, rhinorrhea, tinnitus, trouble swallowing and voice change. Eyes:  Negative for photophobia, pain, discharge and visual disturbance. Respiratory:  Negative for cough, chest tightness, shortness of breath and wheezing. Cardiovascular:  Positive for palpitations. Negative for chest pain. Gastrointestinal:  Negative for abdominal pain, blood in stool, constipation, diarrhea, nausea, rectal pain and vomiting. Endocrine: Negative for cold intolerance, polydipsia, polyphagia and polyuria. Genitourinary:  Negative for decreased urine volume, difficulty urinating, dysuria, enuresis, frequency, genital sores, hematuria and urgency. Musculoskeletal:  Negative for arthralgias, back pain, gait problem, joint swelling, myalgias, neck pain and neck stiffness. Skin:  Negative for color change and rash. Allergic/Immunologic: Negative for environmental allergies, food allergies and immunocompromised state. Neurological:  Negative for dizziness, seizures, speech difficulty, light-headedness and headaches. Hematological:  Negative for adenopathy. Does not bruise/bleed easily.    Psychiatric/Behavioral:  Negative for behavioral problems, confusion, decreased concentration, self-injury and sleep disturbance. The patient is nervous/anxious. The patient is not hyperactive. Past Medical History:     History reviewed. No pertinent past medical history.    Past Surgical History:     Past Surgical History:   Procedure Laterality Date   • CARDIAC CATHETERIZATION N/A 2010   • KNEE ARTHROSCOPY W/ MENISCAL REPAIR Bilateral    • ROTATOR CUFF REPAIR Left       Family History:     Family History   Problem Relation Age of Onset   • Diabetes Mother    • Heart disease Mother    • Rheum arthritis Mother    • Alcohol abuse Father    • Stomach cancer Father    • Breast cancer Maternal Grandmother       Social History:     Social History     Socioeconomic History   • Marital status: Single     Spouse name: None   • Number of children: None   • Years of education: None   • Highest education level: None   Occupational History   • None   Tobacco Use   • Smoking status: Never   • Smokeless tobacco: Never   Vaping Use   • Vaping Use: Never used   Substance and Sexual Activity   • Alcohol use: Yes     Comment: sobor x1 mo 5/2022   • Drug use: No   • Sexual activity: None   Other Topics Concern   • None   Social History Narrative   • None     Social Determinants of Health     Financial Resource Strain: Not on file   Food Insecurity: Not on file   Transportation Needs: Not on file   Physical Activity: Not on file   Stress: Not on file   Social Connections: Not on file   Intimate Partner Violence: Not on file   Housing Stability: Not on file      Current Medications:     Current Outpatient Medications   Medication Sig Dispense Refill   • hydroxychloroquine (PLAQUENIL) 200 mg tablet Take 1 tablet (200 mg total) by mouth 2 (two) times a day with meals 180 tablet 1   • lisinopril (ZESTRIL) 10 mg tablet Take 1 tablet (10 mg total) by mouth daily 30 tablet 2   • meloxicam (MOBIC) 15 mg tablet Take 1 tablet (15 mg total) by mouth daily 90 tablet 1     No current facility-administered medications for this visit. Allergies:     No Known Allergies   Physical Exam:     BP (!) 162/102 (BP Location: Left arm, Patient Position: Sitting, Cuff Size: Large)   Pulse 90   Temp (!) 96.6 °F (35.9 °C) (Tympanic)   Ht 5' 11" (1.803 m)   Wt 119 kg (262 lb)   SpO2 99%   BMI 36.54 kg/m²     Physical Exam  Vitals and nursing note reviewed. Constitutional:       Appearance: Normal appearance. He is well-developed. HENT:      Head: Normocephalic. Right Ear: Hearing, tympanic membrane, ear canal and external ear normal.      Left Ear: Hearing, tympanic membrane, ear canal and external ear normal.      Nose: Nose normal.      Mouth/Throat:      Mouth: Mucous membranes are moist.      Pharynx: Oropharynx is clear. Uvula midline. Eyes:      Extraocular Movements: Extraocular movements intact. Conjunctiva/sclera: Conjunctivae normal.   Neck:      Thyroid: No thyromegaly. Vascular: No carotid bruit. Cardiovascular:      Rate and Rhythm: Normal rate. Rhythm irregular. Pulses: Normal pulses. Heart sounds: Normal heart sounds. Pulmonary:      Effort: Pulmonary effort is normal.      Breath sounds: Normal breath sounds. Abdominal:      General: Bowel sounds are normal.      Palpations: Abdomen is soft. There is no mass. Tenderness: There is no abdominal tenderness. There is no right CVA tenderness or left CVA tenderness. Musculoskeletal:         General: Normal range of motion. Cervical back: Normal range of motion and neck supple. Right lower leg: No edema. Left lower leg: No edema. Lymphadenopathy:      Cervical: No cervical adenopathy. Skin:     General: Skin is warm and dry. Capillary Refill: Capillary refill takes less than 2 seconds. Neurological:      General: No focal deficit present. Mental Status: He is alert and oriented to person, place, and time.    Psychiatric:         Mood and Affect: Mood normal. Behavior: Behavior normal.         Thought Content:  Thought content normal.         Judgment: Judgment normal.          Jami Flores PA-C  [facility] [address]

## 2023-10-26 NOTE — PATIENT INSTRUCTIONS
Wellness Visit for Adults   AMBULATORY CARE:   A wellness visit  is when you see your healthcare provider to get screened for health problems. Your healthcare provider will also give you advice on how to stay healthy. Write down your questions so you remember to ask them. Ask your healthcare provider how often you should have a wellness visit. What happens at a wellness visit:  Your healthcare provider will ask about your health, and your family history of health problems. This includes high blood pressure, heart disease, and cancer. He or she will ask if you have symptoms that concern you, if you smoke, and about your mood. You may also be asked about your intake of medicines, supplements, food, and alcohol. Any of the following may be done: Your weight  will be checked. Your height may also be checked so your body mass index (BMI) can be calculated. Your BMI shows if you are at a healthy weight. Your blood pressure  and heart rate will be checked. Your temperature may also be checked. Blood and urine tests  may be done. Blood tests may be done to check your cholesterol levels. Abnormal cholesterol levels increase your risk for heart disease and stroke. You may also need a blood or urine test to check for diabetes if you are at increased risk. Urine tests may be done to look for signs of an infection or kidney disease. A physical exam  includes checking your heartbeat and lungs with a stethoscope. Your healthcare provider may also check your skin to look for sun damage. Screening tests  may be recommended. A screening test is done to check for diseases that may not cause symptoms. The screening tests you may need depend on your age, gender, family history, and lifestyle habits. For example, colorectal screening may be recommended if you are 48years old or older. Screening tests you need if you are a woman:   A Pap smear  is used to screen for cervical cancer.  Pap smears are usually done every 3 to 5 years depending on your age. You may need them more often if you have had abnormal Pap smear test results in the past. Ask your healthcare provider how often you should have a Pap smear. A mammogram  is an x-ray of your breasts to screen for breast cancer. Experts recommend mammograms every 2 years starting at age 48 years. You may need a mammogram at age 52 years or younger if you have an increased risk for breast cancer. Talk to your healthcare provider about when you should start having mammograms and how often you need them. Vaccines you may need:   Get an influenza vaccine  every year. The influenza vaccine protects you from the flu. Several types of viruses cause the flu. The viruses change over time, so new vaccines are made each year. Get a tetanus-diphtheria (Td) booster vaccine  every 10 years. This vaccine protects you against tetanus and diphtheria. Tetanus is a severe infection that may cause painful muscle spasms and lockjaw. Diphtheria is a severe bacterial infection that causes a thick covering in the back of your mouth and throat. Get a human papillomavirus (HPV) vaccine  if you are female and aged 23 to 32 or male 23 to 24 and never received it. This vaccine protects you from HPV infection. HPV is the most common infection spread by sexual contact. HPV may also cause vaginal, penile, and anal cancers. Get a pneumococcal vaccine  if you are aged 72 years or older. The pneumococcal vaccine is an injection given to protect you from pneumococcal disease. Pneumococcal disease is an infection caused by pneumococcal bacteria. The infection may cause pneumonia, meningitis, or an ear infection. Get a shingles vaccine  if you are 60 or older, even if you have had shingles before. The shingles vaccine is an injection to protect you from the varicella-zoster virus. This is the same virus that causes chickenpox.  Shingles is a painful rash that develops in people who had chickenpox or have been exposed to the virus. How to eat healthy:  My Plate is a model for planning healthy meals. It shows the types and amounts of foods that should go on your plate. Fruits and vegetables make up about half of your plate, and grains and protein make up the other half. A serving of dairy is included on the side of your plate. The amount of calories and serving sizes you need depends on your age, gender, weight, and height. Examples of healthy foods are listed below:  Eat a variety of vegetables  such as dark green, red, and orange vegetables. You can also include canned vegetables low in sodium (salt) and frozen vegetables without added butter or sauces. Eat a variety of fresh fruits , canned fruit in 100% juice, frozen fruit, and dried fruit. Include whole grains. At least half of the grains you eat should be whole grains. Examples include whole-wheat bread, wheat pasta, brown rice, and whole-grain cereals such as oatmeal.    Eat a variety of protein foods such as seafood (fish and shellfish), lean meat, and poultry without skin (turkey and chicken). Examples of lean meats include pork leg, shoulder, or tenderloin, and beef round, sirloin, tenderloin, and extra lean ground beef. Other protein foods include eggs and egg substitutes, beans, peas, soy products, nuts, and seeds. Choose low-fat dairy products such as skim or 1% milk or low-fat yogurt, cheese, and cottage cheese. Limit unhealthy fats  such as butter, hard margarine, and shortening. Exercise:  Exercise at least 30 minutes per day on most days of the week. Some examples of exercise include walking, biking, dancing, and swimming. You can also fit in more physical activity by taking the stairs instead of the elevator or parking farther away from stores. Include muscle strengthening activities 2 days each week. Regular exercise provides many health benefits.  It helps you manage your weight, and decreases your risk for type 2 diabetes, heart disease, stroke, and high blood pressure. Exercise can also help improve your mood. Ask your healthcare provider about the best exercise plan for you. General health and safety guidelines:   Do not smoke. Nicotine and other chemicals in cigarettes and cigars can cause lung damage. Ask your healthcare provider for information if you currently smoke and need help to quit. E-cigarettes or smokeless tobacco still contain nicotine. Talk to your healthcare provider before you use these products. Limit alcohol. A drink of alcohol is 12 ounces of beer, 5 ounces of wine, or 1½ ounces of liquor. Lose weight, if needed. Being overweight increases your risk of certain health conditions. These include heart disease, high blood pressure, type 2 diabetes, and certain types of cancer. Protect your skin. Do not sunbathe or use tanning beds. Use sunscreen with a SPF 15 or higher. Apply sunscreen at least 15 minutes before you go outside. Reapply sunscreen every 2 hours. Wear protective clothing, hats, and sunglasses when you are outside. Drive safely. Always wear your seatbelt. Make sure everyone in your car wears a seatbelt. A seatbelt can save your life if you are in an accident. Do not use your cell phone when you are driving. This could distract you and cause an accident. Pull over if you need to make a call or send a text message. Practice safe sex. Use latex condoms if are sexually active and have more than one partner. Your healthcare provider may recommend screening tests for sexually transmitted infections (STIs). Wear helmets, lifejackets, and protective gear. Always wear a helmet when you ride a bike or motorcycle, go skiing, or play sports that could cause a head injury. Wear protective equipment when you play sports. Wear a lifejacket when you are on a boat or doing water sports.     © Copyright [facility] 2023 Information is for End User's use only and may not be sold, redistributed or otherwise used for commercial purposes. The above information is an  only. It is not intended as medical advice for individual conditions or treatments. Talk to your doctor, nurse or pharmacist before following any medical regimen to see if it is safe and effective for you. Wellness Visit for Adults   AMBULATORY CARE:   A wellness visit  is when you see your healthcare provider to get screened for health problems. Your healthcare provider will also give you advice on how to stay healthy. Write down your questions so you remember to ask them. Ask your healthcare provider how often you should have a wellness visit. What happens at a wellness visit:  Your healthcare provider will ask about your health, and your family history of health problems. This includes high blood pressure, heart disease, and cancer. He or she will ask if you have symptoms that concern you, if you smoke, and about your mood. You may also be asked about your intake of medicines, supplements, food, and alcohol. Any of the following may be done: Your weight  will be checked. Your height may also be checked so your body mass index (BMI) can be calculated. Your BMI shows if you are at a healthy weight. Your blood pressure  and heart rate will be checked. Your temperature may also be checked. Blood and urine tests  may be done. Blood tests may be done to check your cholesterol levels. Abnormal cholesterol levels increase your risk for heart disease and stroke. You may also need a blood or urine test to check for diabetes if you are at increased risk. Urine tests may be done to look for signs of an infection or kidney disease. A physical exam  includes checking your heartbeat and lungs with a stethoscope. Your healthcare provider may also check your skin to look for sun damage. Screening tests  may be recommended. A screening test is done to check for diseases that may not cause symptoms.  The screening tests you may need depend on your age, gender, family history, and lifestyle habits. For example, colorectal screening may be recommended if you are 48years old or older. Screening tests you need if you are a woman:   A Pap smear  is used to screen for cervical cancer. Pap smears are usually done every 3 to 5 years depending on your age. You may need them more often if you have had abnormal Pap smear test results in the past. Ask your healthcare provider how often you should have a Pap smear. A mammogram  is an x-ray of your breasts to screen for breast cancer. Experts recommend mammograms every 2 years starting at age 48 years. You may need a mammogram at age 52 years or younger if you have an increased risk for breast cancer. Talk to your healthcare provider about when you should start having mammograms and how often you need them. Vaccines you may need:   Get an influenza vaccine  every year. The influenza vaccine protects you from the flu. Several types of viruses cause the flu. The viruses change over time, so new vaccines are made each year. Get a tetanus-diphtheria (Td) booster vaccine  every 10 years. This vaccine protects you against tetanus and diphtheria. Tetanus is a severe infection that may cause painful muscle spasms and lockjaw. Diphtheria is a severe bacterial infection that causes a thick covering in the back of your mouth and throat. Get a human papillomavirus (HPV) vaccine  if you are female and aged 23 to 32 or male 23 to 24 and never received it. This vaccine protects you from HPV infection. HPV is the most common infection spread by sexual contact. HPV may also cause vaginal, penile, and anal cancers. Get a pneumococcal vaccine  if you are aged 72 years or older. The pneumococcal vaccine is an injection given to protect you from pneumococcal disease. Pneumococcal disease is an infection caused by pneumococcal bacteria. The infection may cause pneumonia, meningitis, or an ear infection.     Get a shingles vaccine  if you are 60 or older, even if you have had shingles before. The shingles vaccine is an injection to protect you from the varicella-zoster virus. This is the same virus that causes chickenpox. Shingles is a painful rash that develops in people who had chickenpox or have been exposed to the virus. How to eat healthy:  My Plate is a model for planning healthy meals. It shows the types and amounts of foods that should go on your plate. Fruits and vegetables make up about half of your plate, and grains and protein make up the other half. A serving of dairy is included on the side of your plate. The amount of calories and serving sizes you need depends on your age, gender, weight, and height. Examples of healthy foods are listed below:  Eat a variety of vegetables  such as dark green, red, and orange vegetables. You can also include canned vegetables low in sodium (salt) and frozen vegetables without added butter or sauces. Eat a variety of fresh fruits , canned fruit in 100% juice, frozen fruit, and dried fruit. Include whole grains. At least half of the grains you eat should be whole grains. Examples include whole-wheat bread, wheat pasta, brown rice, and whole-grain cereals such as oatmeal.    Eat a variety of protein foods such as seafood (fish and shellfish), lean meat, and poultry without skin (turkey and chicken). Examples of lean meats include pork leg, shoulder, or tenderloin, and beef round, sirloin, tenderloin, and extra lean ground beef. Other protein foods include eggs and egg substitutes, beans, peas, soy products, nuts, and seeds. Choose low-fat dairy products such as skim or 1% milk or low-fat yogurt, cheese, and cottage cheese. Limit unhealthy fats  such as butter, hard margarine, and shortening. Exercise:  Exercise at least 30 minutes per day on most days of the week. Some examples of exercise include walking, biking, dancing, and swimming.  You can also fit in more physical activity by taking the stairs instead of the elevator or parking farther away from stores. Include muscle strengthening activities 2 days each week. Regular exercise provides many health benefits. It helps you manage your weight, and decreases your risk for type 2 diabetes, heart disease, stroke, and high blood pressure. Exercise can also help improve your mood. Ask your healthcare provider about the best exercise plan for you. General health and safety guidelines:   Do not smoke. Nicotine and other chemicals in cigarettes and cigars can cause lung damage. Ask your healthcare provider for information if you currently smoke and need help to quit. E-cigarettes or smokeless tobacco still contain nicotine. Talk to your healthcare provider before you use these products. Limit alcohol. A drink of alcohol is 12 ounces of beer, 5 ounces of wine, or 1½ ounces of liquor. Lose weight, if needed. Being overweight increases your risk of certain health conditions. These include heart disease, high blood pressure, type 2 diabetes, and certain types of cancer. Protect your skin. Do not sunbathe or use tanning beds. Use sunscreen with a SPF 15 or higher. Apply sunscreen at least 15 minutes before you go outside. Reapply sunscreen every 2 hours. Wear protective clothing, hats, and sunglasses when you are outside. Drive safely. Always wear your seatbelt. Make sure everyone in your car wears a seatbelt. A seatbelt can save your life if you are in an accident. Do not use your cell phone when you are driving. This could distract you and cause an accident. Pull over if you need to make a call or send a text message. Practice safe sex. Use latex condoms if are sexually active and have more than one partner. Your healthcare provider may recommend screening tests for sexually transmitted infections (STIs). Wear helmets, lifejackets, and protective gear.   Always wear a helmet when you ride a bike or motorcycle, go skiing, or play sports that could cause a head injury. Wear protective equipment when you play sports. Wear a lifejacket when you are on a boat or doing water sports. © Copyright [employer] 2023 Information is for End User's use only and may not be sold, redistributed or otherwise used for commercial purposes. The above information is an  only. It is not intended as medical advice for individual conditions or treatments. Talk to your doctor, nurse or pharmacist before following any medical regimen to see if it is safe and effective for you. Cholesterol and Your Health   AMBULATORY CARE:   Cholesterol  is a waxy, fat-like substance. Your body uses cholesterol to make hormones and new cells, and to protect nerves. Cholesterol is made by your body. It also comes from certain foods you eat, such as meat and dairy products. Your healthcare provider can help you set goals for your cholesterol levels. Your provider can help you create a plan to meet your goals. Cholesterol level goals: Your cholesterol level goals depend on your risk for heart disease, your age, and your other health conditions. The following are general guidelines: Total cholesterol  includes low-density lipoprotein (LDL), high-density lipoprotein (HDL), and triglyceride levels. The total cholesterol level should be lower than 200 mg/dL and is best at about 150 mg/dL. LDL cholesterol  is called bad cholesterol  because it forms plaque in your arteries. As plaque builds up, your arteries become narrow, and less blood flows through. When plaque decreases blood flow to your heart, you may have chest pain. If plaque completely blocks an artery that brings blood to your heart, you may have a heart attack. Plaque can break off and form blood clots. Blood clots may block arteries in your brain and cause a stroke. The level should be less than 130 mg/dL and is best at about 100 mg/dL.          HDL cholesterol  is called good cholesterol  because it helps remove LDL cholesterol from your arteries. It does this by attaching to LDL cholesterol and carrying it to your liver. Your liver breaks down LDL cholesterol so your body can get rid of it. High levels of HDL cholesterol can help prevent a heart attack and stroke. Low levels of HDL cholesterol can increase your risk for heart disease, heart attack, and stroke. The level should be at least 40 mg/dL in males or at least 50 mg/dL in females. Triglycerides  are a type of fat that store energy from foods you eat. High levels of triglycerides also cause plaque buildup. This can increase your risk for a heart attack or stroke. If your triglyceride level is high, your LDL cholesterol level may also be high. The level should be less than 150 mg/dL. Any of the following can increase your risk for high cholesterol:   Smoking or drinking large amounts of alcohol    Having overweight or obesity, or not getting enough exercise    A medical condition such as hypertension (high blood pressure) or diabetes    A family history of high cholesterol    Age older than 72    What you need to know about having your cholesterol levels checked: Adults 21to 39years of age should have their cholesterol levels checked every 4 to 6 years. Adults 45 years or older should have their cholesterol checked every 1 to 2 years. You may need your cholesterol checked more often, or at a younger age, if you have risk factors for heart disease. You may also need to have your cholesterol checked more often if you have other health conditions, such as diabetes. Blood tests are used to check cholesterol levels. Blood tests measure your levels of triglycerides, LDL cholesterol, and HDL cholesterol. How healthy fats affect your cholesterol levels:  Healthy fats, also called unsaturated fats, help lower LDL cholesterol and triglyceride levels.  Healthy fats include the following:  Monounsaturated fats  are found in foods such as olive oil, canola oil, avocado, nuts, and olives. Polyunsaturated fats,  such as omega 3 fats, are found in fish, such as salmon, trout, and tuna. They can also be found in plant foods such as flaxseed, walnuts, and soybeans. How unhealthy fats affect your cholesterol levels:  Unhealthy fats increase LDL cholesterol and triglyceride levels. They are found in foods high in cholesterol, saturated fat, and trans fat:  Cholesterol  is found in eggs, dairy, and meat. Saturated fat  is found in butter, cheese, ice cream, whole milk, and coconut oil. Saturated fat is also found in meat, such as sausage, hot dogs, and bologna. Trans fat  is found in liquid oils and is used in fried and baked foods. Foods that contain trans fats include chips, crackers, muffins, sweet rolls, microwave popcorn, and cookies. Treatment  for high cholesterol will also decrease your risk of heart disease, heart attack, and stroke. Treatment may include any of the following:  Lifestyle changes  may include food, exercise, weight loss, and quitting smoking. You may also need to decrease the amount of alcohol you drink. Your healthcare provider will want you to start with lifestyle changes. Other treatment may be added if lifestyle changes are not enough. Your healthcare provider may recommend you work with a team to manage hyperlipidemia. The team may include medical experts such as a dietitian, an exercise or physical therapist, and a behavior therapist. Your family members may be included in helping you create lifestyle changes. Medicines  may be given to lower your LDL cholesterol, triglyceride levels, or total cholesterol level. You may need medicines to lower your cholesterol if any of the following is true:    You have a history of stroke, TIA, unstable angina, or a heart attack. Your LDL cholesterol level is 190 mg/dL or higher.     You are age 36 to 76 years, have diabetes or heart disease risk factors, and your LDL cholesterol is 70 mg/dL or higher. Supplements  include fish oil, red yeast rice, and garlic. Fish oil may help lower your triglyceride and LDL cholesterol levels. It may also increase your HDL cholesterol level. Red yeast rice may help decrease your total cholesterol level and LDL cholesterol level. Garlic may help lower your total cholesterol level. Do not take any supplements without talking to your healthcare provider. Food changes you can make to lower your cholesterol levels:  A dietitian can help you create a healthy eating plan. Your dietitian can show you how to read food labels and choose foods low in saturated fat, trans fats, and cholesterol. Decrease the total amount of fat you eat. Choose lean meats, fat-free or 1% fat milk, and low-fat dairy products, such as yogurt and cheese. Try to limit or avoid red meats. Limit or do not eat fried foods or baked goods, such as cookies. Replace unhealthy fats with healthy fats. Cook foods in olive oil or canola oil. Choose soft margarines that are low in saturated fat and trans fat. Seeds, nuts, and avocados are other examples of healthy fats. Eat foods with omega-3 fats. Examples include salmon, tuna, mackerel, walnuts, and flaxseed. Eat fish 2 times per week. Pregnant women should not eat fish that have high levels of mercury, such as shark, swordfish, and [place] mackerel. Increase the amount of high-fiber foods you eat. High-fiber foods can help lower your LDL cholesterol. Aim to get between 20 and 30 grams of fiber each day. Fruits and vegetables are high in fiber. Eat at least 5 servings each day. Other high-fiber foods are whole-grain or whole-wheat breads, pastas, or cereals, and brown rice. Eat 3 ounces of whole-grain foods each day. Increase fiber slowly. You may have abdominal discomfort, bloating, and gas if you add fiber to your diet too quickly. Eat healthy protein foods.   Examples include low-fat dairy products, skinless chicken and turkey, fish, and nuts. Limit foods and drinks that are high in sugar. Your dietitian or healthcare provider can help you create daily limits for high-sugar foods and drinks. The limit may be lower if you have diabetes or another health condition. Limits can also help you lose weight if needed. Lifestyle changes you can make to lower your cholesterol levels:   Maintain a healthy weight. Ask your healthcare provider what a healthy weight is for you. Ask your provider to help you create a weight loss plan if needed. Weight loss can decrease your total cholesterol and triglyceride levels. Weight loss may also help keep your blood pressure at a healthy level. Be physically active throughout the day. Physical activity, such as exercise, can help lower your total cholesterol level and maintain a healthy weight. Physical activity can also help increase your HDL cholesterol level. Work with your healthcare provider to create an program that is right for you. Get at least 30 to 40 minutes of moderate physical activity most days of the week. Examples of exercise include brisk walking, swimming, or biking. Also include strength training at least 2 times each week. Your healthcare providers can help you create a physical activity plan. Do not smoke. Nicotine and other chemicals in cigarettes and cigars can raise your cholesterol levels. Ask your healthcare provider for information if you currently smoke and need help to quit. E-cigarettes or smokeless tobacco still contain nicotine. Talk to your healthcare provider before you use these products. Limit or do not drink alcohol. Alcohol can increase your triglyceride levels. Ask your healthcare provider before you drink alcohol. Ask how much is okay for you to drink in 24 hours or 1 week. Follow up with your doctor as directed:  Write down your questions so you remember to ask them during your visits.   © Copyright [employer] 2023 Information is for End User's use only and may not be sold, redistributed or otherwise used for commercial purposes. The above information is an  only. It is not intended as medical advice for individual conditions or treatments. Talk to your doctor, nurse or pharmacist before following any medical regimen to see if it is safe and effective for you.

## 2023-11-08 ENCOUNTER — TELEPHONE (OUTPATIENT)
Age: 53
End: 2023-11-08

## 2023-11-08 NOTE — TELEPHONE ENCOUNTER
Scheduled date of colonoscopy (as of today):01/15/2024  Physician performing colonoscopy:Dr Londono  Location of colonoscopy:[facility]  Bowel prep reviewed with patient:miralax/dul  Instructions reviewed with patient by:sent via my chart   Clearances: n/a

## 2023-11-08 NOTE — TELEPHONE ENCOUNTER
11/08/23 11/08/23  Screened by: Kendra Lazo     Referring Provider      Pre- Screening: Body mass index is 36.54 kg/m². Has patient been referred for a routine screening Colonoscopy? yes  Is the patient between 43-73 years old? yes        Previous Colonoscopy no   If yes:    Date:     Facility:     Reason:         SCHEDULING STAFF: If the patient is between 45yrs-49yrs, please advise patient to confirm benefits/coverage with their insurance company for a routine screening colonoscopy, some insurance carriers will only cover at [address] [place] [address] or older. If the patient is over 66years old, please schedule an office visit. Does the patient want to see a Gastroenterologist prior to their procedure OR are they having any GI symptoms? no     Has the patient been hospitalized or had abdominal surgery in the past 6 months? no     Does the patient use supplemental oxygen? no     Does the patient take Coumadin, Lovenox, Plavix, Elliquis, Xarelto, or other blood thinning medication? no     Has the patient had a stroke, cardiac event, or stent placed in the past year? no     SCHEDULING STAFF: If patient answers NO to above questions, then schedule procedure. If patient answers YES to above questions, then schedule office appointment. Patient passed OA  If patient is between 45yrs - 49yrs, please advise patient that we will have to confirm benefits & coverage with their insurance company for a routine screening colonoscopy.

## 2024-01-05 DIAGNOSIS — M06.9 RHEUMATOID ARTHRITIS, INVOLVING UNSPECIFIED SITE, UNSPECIFIED WHETHER RHEUMATOID FACTOR PRESENT (HCC): ICD-10-CM

## 2024-01-05 DIAGNOSIS — M25.50 POLYARTHRALGIA: ICD-10-CM

## 2024-01-05 DIAGNOSIS — M10.9 GOUT, UNSPECIFIED CAUSE, UNSPECIFIED CHRONICITY, UNSPECIFIED SITE: ICD-10-CM

## 2024-01-05 RX ORDER — MELOXICAM 15 MG/1
15 TABLET ORAL DAILY
Qty: 90 TABLET | Refills: 1 | Status: SHIPPED | OUTPATIENT
Start: 2024-01-05

## 2024-02-21 DIAGNOSIS — I10 PRIMARY HYPERTENSION: ICD-10-CM

## 2024-02-21 RX ORDER — LISINOPRIL 10 MG/1
10 TABLET ORAL DAILY
Qty: 30 TABLET | Refills: 1 | Status: SHIPPED | OUTPATIENT
Start: 2024-02-21

## 2024-05-29 ENCOUNTER — APPOINTMENT (EMERGENCY)
Dept: RADIOLOGY | Facility: HOSPITAL | Age: 54
DRG: 192 | End: 2024-05-29
Payer: COMMERCIAL

## 2024-05-29 ENCOUNTER — OFFICE VISIT (OUTPATIENT)
Dept: FAMILY MEDICINE CLINIC | Facility: CLINIC | Age: 54
End: 2024-05-29
Payer: COMMERCIAL

## 2024-05-29 ENCOUNTER — HOSPITAL ENCOUNTER (INPATIENT)
Facility: HOSPITAL | Age: 54
LOS: 6 days | Discharge: HOME | DRG: 192 | End: 2024-06-04
Attending: EMERGENCY MEDICINE | Admitting: STUDENT IN AN ORGANIZED HEALTH CARE EDUCATION/TRAINING PROGRAM
Payer: COMMERCIAL

## 2024-05-29 VITALS
WEIGHT: 288 LBS | DIASTOLIC BLOOD PRESSURE: 120 MMHG | OXYGEN SATURATION: 99 % | SYSTOLIC BLOOD PRESSURE: 220 MMHG | TEMPERATURE: 98.7 F | HEART RATE: 134 BPM | HEIGHT: 71 IN | BODY MASS INDEX: 40.32 KG/M2

## 2024-05-29 DIAGNOSIS — I10 PRIMARY HYPERTENSION: ICD-10-CM

## 2024-05-29 DIAGNOSIS — I48.91 NEW ONSET ATRIAL FIBRILLATION (HCC): ICD-10-CM

## 2024-05-29 DIAGNOSIS — I42.8 OTHER CARDIOMYOPATHY (HCC): ICD-10-CM

## 2024-05-29 DIAGNOSIS — R00.0 TACHYCARDIA: ICD-10-CM

## 2024-05-29 DIAGNOSIS — I42.9 CARDIOMYOPATHY (HCC): ICD-10-CM

## 2024-05-29 DIAGNOSIS — R60.0 BILATERAL LOWER EXTREMITY EDEMA: Primary | ICD-10-CM

## 2024-05-29 DIAGNOSIS — I48.91 ATRIAL FIBRILLATION (HCC): ICD-10-CM

## 2024-05-29 DIAGNOSIS — F41.9 ANXIETY: ICD-10-CM

## 2024-05-29 DIAGNOSIS — I48.91 ATRIAL FIBRILLATION WITH RVR (HCC): Primary | ICD-10-CM

## 2024-05-29 DIAGNOSIS — E87.70 VOLUME OVERLOAD: ICD-10-CM

## 2024-05-29 DIAGNOSIS — R60.0 BILATERAL LOWER EXTREMITY EDEMA: ICD-10-CM

## 2024-05-29 LAB
2HR DELTA HS TROPONIN: 0 NG/L
ALBUMIN SERPL BCP-MCNC: 4.4 G/DL (ref 3.5–5)
ALP SERPL-CCNC: 93 U/L (ref 34–104)
ALT SERPL W P-5'-P-CCNC: 35 U/L (ref 7–52)
ANION GAP SERPL CALCULATED.3IONS-SCNC: 10 MMOL/L (ref 4–13)
AST SERPL W P-5'-P-CCNC: 29 U/L (ref 13–39)
BASOPHILS # BLD AUTO: 0.07 THOUSANDS/ÂΜL (ref 0–0.1)
BASOPHILS NFR BLD AUTO: 1 % (ref 0–1)
BILIRUB SERPL-MCNC: 1.51 MG/DL (ref 0.2–1)
BNP SERPL-MCNC: 562 PG/ML (ref 0–100)
BUN SERPL-MCNC: 14 MG/DL (ref 5–25)
CALCIUM SERPL-MCNC: 9.6 MG/DL (ref 8.4–10.2)
CARDIAC TROPONIN I PNL SERPL HS: 28 NG/L
CARDIAC TROPONIN I PNL SERPL HS: 28 NG/L
CHLORIDE SERPL-SCNC: 105 MMOL/L (ref 96–108)
CO2 SERPL-SCNC: 25 MMOL/L (ref 21–32)
CREAT SERPL-MCNC: 0.96 MG/DL (ref 0.6–1.3)
EOSINOPHIL # BLD AUTO: 0.15 THOUSAND/ÂΜL (ref 0–0.61)
EOSINOPHIL NFR BLD AUTO: 2 % (ref 0–6)
ERYTHROCYTE [DISTWIDTH] IN BLOOD BY AUTOMATED COUNT: 14.4 % (ref 11.6–15.1)
GFR SERPL CREATININE-BSD FRML MDRD: 88 ML/MIN/1.73SQ M
GLUCOSE SERPL-MCNC: 114 MG/DL (ref 65–140)
HCT VFR BLD AUTO: 46.6 % (ref 36.5–49.3)
HGB BLD-MCNC: 14.6 G/DL (ref 12–17)
IMM GRANULOCYTES # BLD AUTO: 0.03 THOUSAND/UL (ref 0–0.2)
IMM GRANULOCYTES NFR BLD AUTO: 0 % (ref 0–2)
LYMPHOCYTES # BLD AUTO: 1.68 THOUSANDS/ÂΜL (ref 0.6–4.47)
LYMPHOCYTES NFR BLD AUTO: 17 % (ref 14–44)
MCH RBC QN AUTO: 27.4 PG (ref 26.8–34.3)
MCHC RBC AUTO-ENTMCNC: 31.3 G/DL (ref 31.4–37.4)
MCV RBC AUTO: 88 FL (ref 82–98)
MONOCYTES # BLD AUTO: 1.05 THOUSAND/ÂΜL (ref 0.17–1.22)
MONOCYTES NFR BLD AUTO: 11 % (ref 4–12)
NEUTROPHILS # BLD AUTO: 7.02 THOUSANDS/ÂΜL (ref 1.85–7.62)
NEUTS SEG NFR BLD AUTO: 69 % (ref 43–75)
NRBC BLD AUTO-RTO: 0 /100 WBCS
PLATELET # BLD AUTO: 320 THOUSANDS/UL (ref 149–390)
PMV BLD AUTO: 11.2 FL (ref 8.9–12.7)
POTASSIUM SERPL-SCNC: 3.7 MMOL/L (ref 3.5–5.3)
PROT SERPL-MCNC: 7.4 G/DL (ref 6.4–8.4)
RBC # BLD AUTO: 5.32 MILLION/UL (ref 3.88–5.62)
SODIUM SERPL-SCNC: 140 MMOL/L (ref 135–147)
WBC # BLD AUTO: 10 THOUSAND/UL (ref 4.31–10.16)

## 2024-05-29 PROCEDURE — 96374 THER/PROPH/DIAG INJ IV PUSH: CPT

## 2024-05-29 PROCEDURE — 83880 ASSAY OF NATRIURETIC PEPTIDE: CPT

## 2024-05-29 PROCEDURE — 36415 COLL VENOUS BLD VENIPUNCTURE: CPT

## 2024-05-29 PROCEDURE — 99285 EMERGENCY DEPT VISIT HI MDM: CPT

## 2024-05-29 PROCEDURE — 93005 ELECTROCARDIOGRAM TRACING: CPT

## 2024-05-29 PROCEDURE — 99215 OFFICE O/P EST HI 40 MIN: CPT | Performed by: FAMILY MEDICINE

## 2024-05-29 PROCEDURE — 85025 COMPLETE CBC W/AUTO DIFF WBC: CPT

## 2024-05-29 PROCEDURE — 96376 TX/PRO/DX INJ SAME DRUG ADON: CPT

## 2024-05-29 PROCEDURE — 71045 X-RAY EXAM CHEST 1 VIEW: CPT

## 2024-05-29 PROCEDURE — 93000 ELECTROCARDIOGRAM COMPLETE: CPT | Performed by: FAMILY MEDICINE

## 2024-05-29 PROCEDURE — 84443 ASSAY THYROID STIM HORMONE: CPT | Performed by: PHYSICIAN ASSISTANT

## 2024-05-29 PROCEDURE — 84484 ASSAY OF TROPONIN QUANT: CPT

## 2024-05-29 PROCEDURE — 80053 COMPREHEN METABOLIC PANEL: CPT

## 2024-05-29 PROCEDURE — 99222 1ST HOSP IP/OBS MODERATE 55: CPT | Performed by: PHYSICIAN ASSISTANT

## 2024-05-29 RX ORDER — LORAZEPAM 0.5 MG/1
0.5 TABLET ORAL ONCE
Status: COMPLETED | OUTPATIENT
Start: 2024-05-29 | End: 2024-05-29

## 2024-05-29 RX ORDER — DILTIAZEM HYDROCHLORIDE 5 MG/ML
25 INJECTION INTRAVENOUS ONCE
Status: COMPLETED | OUTPATIENT
Start: 2024-05-29 | End: 2024-05-29

## 2024-05-29 RX ORDER — DILTIAZEM HYDROCHLORIDE 5 MG/ML
20 INJECTION INTRAVENOUS ONCE
Status: COMPLETED | OUTPATIENT
Start: 2024-05-29 | End: 2024-05-29

## 2024-05-29 RX ORDER — DILTIAZEM HYDROCHLORIDE 5 MG/ML
20 INJECTION INTRAVENOUS ONCE
Status: DISCONTINUED | OUTPATIENT
Start: 2024-05-29 | End: 2024-05-29

## 2024-05-29 RX ORDER — DILTIAZEM HYDROCHLORIDE 5 MG/ML
0.25 INJECTION INTRAVENOUS ONCE
Status: DISCONTINUED | OUTPATIENT
Start: 2024-05-29 | End: 2024-05-29

## 2024-05-29 RX ORDER — HYDROXYZINE HYDROCHLORIDE 25 MG/1
25 TABLET, FILM COATED ORAL ONCE
Status: COMPLETED | OUTPATIENT
Start: 2024-05-29 | End: 2024-05-29

## 2024-05-29 RX ADMIN — DILTIAZEM HYDROCHLORIDE 5 MG/HR: 5 INJECTION INTRAVENOUS at 23:36

## 2024-05-29 RX ADMIN — LORAZEPAM 0.5 MG: 0.5 TABLET ORAL at 23:10

## 2024-05-29 RX ADMIN — DILTIAZEM HYDROCHLORIDE 25 MG: 5 INJECTION INTRAVENOUS at 22:03

## 2024-05-29 RX ADMIN — DILTIAZEM HYDROCHLORIDE 25 MG: 5 INJECTION INTRAVENOUS at 21:00

## 2024-05-29 RX ADMIN — DILTIAZEM HYDROCHLORIDE 20 MG: 5 INJECTION INTRAVENOUS at 23:36

## 2024-05-29 RX ADMIN — HYDROXYZINE HYDROCHLORIDE 25 MG: 25 TABLET ORAL at 20:32

## 2024-05-29 NOTE — PROGRESS NOTES
"Ambulatory Visit  Name: Antonio Mckeon      : 1970      MRN: [id number]  Encounter Provider: Estrella Bolaños DO  Encounter Date: 2024   Encounter department: [facility] [address]    Assessment & Plan   1. Atrial fibrillation with RVR (HCC)  2. Tachycardia  -     POCT ECG  3. Bilateral lower extremity edema  4. Primary hypertension  5. Anxiety      New onset A fib with RVR on EKG, B/L LE edema and lower abdomen edema with very elevated BP, encouraged patient to go to the ED. States that he needs to drop his son off. Declined ambulance. States that he will bring himself to the ED after dropping his son off. Discussed the risks including heart attack, stroke and death. Patient voiced understanding.     History of Present Illness     HPI  Patient presents to the office for evaluation.     Started with leg swelling on Thursday, progresses throughout the day. Having swelling in the lower abdomen as well. Denies orthopnea. Denies chest pain or SOB. Notes that since he started noticing the swelling in his legs, he has been having some palpitations. Denies changes in urinary habits    States that he has had a heart attack previously, states taht this was a long time ago when he was younger. Reports that this was drug related.     Has been feeling more anxious today.     Mother with a fib. Denies personal history of a fib.     Review of Systems    Objective     BP (!) 220/120 (BP Location: Left arm, Patient Position: Sitting, Cuff Size: Standard)   Pulse (!) 134   Temp 98.7 °F (37.1 °C) (Tympanic)   Ht 5' 11\" (1.803 m)   Wt 131 kg (288 lb)   SpO2 99%   BMI 40.17 kg/m²   HR ranging from 134-170s in office.     Physical Exam  Vitals reviewed.   Constitutional:       Appearance: He is obese.   HENT:      Head: Normocephalic and atraumatic.      Right Ear: External ear normal.      Left Ear: External ear normal.      Nose: Nose normal.      Mouth/Throat:      Mouth: " Mucous membranes are moist.   Eyes:      Extraocular Movements: Extraocular movements intact.      Conjunctiva/sclera: Conjunctivae normal.   Cardiovascular:      Rate and Rhythm: Regular rhythm. Tachycardia present.   Pulmonary:      Effort: Pulmonary effort is normal.      Breath sounds: Normal breath sounds. No wheezing, rhonchi or rales.   Abdominal:      General: Bowel sounds are normal. There is no distension.      Palpations: Abdomen is soft.      Tenderness: There is no abdominal tenderness.      Comments: Trace pitting edema of lower abdominal pannus   Musculoskeletal:      Right lower leg: Edema present.      Left lower leg: Edema present.   Skin:     General: Skin is warm.      Capillary Refill: Capillary refill takes less than 2 seconds.      Findings: No rash.   Neurological:      Mental Status: He is alert. Mental status is at baseline.   Psychiatric:         Mood and Affect: Mood is anxious.       Administrative Statements       DO Myron Bazzi [facility]  5/29/2024 1:11 PM

## 2024-05-30 ENCOUNTER — APPOINTMENT (INPATIENT)
Dept: NON INVASIVE DIAGNOSTICS | Facility: HOSPITAL | Age: 54
DRG: 192 | End: 2024-05-30
Payer: COMMERCIAL

## 2024-05-30 LAB
4HR DELTA HS TROPONIN: -5 NG/L
APTT PPP: 35 SECONDS (ref 23–37)
APTT PPP: 43 SECONDS (ref 23–37)
ATRIAL RATE: 166 BPM
BASOPHILS # BLD AUTO: 0.07 THOUSANDS/ÂΜL (ref 0–0.1)
BASOPHILS NFR BLD AUTO: 1 % (ref 0–1)
CARDIAC TROPONIN I PNL SERPL HS: 23 NG/L
EOSINOPHIL # BLD AUTO: 0.09 THOUSAND/ÂΜL (ref 0–0.61)
EOSINOPHIL NFR BLD AUTO: 1 % (ref 0–6)
ERYTHROCYTE [DISTWIDTH] IN BLOOD BY AUTOMATED COUNT: 14.4 % (ref 11.6–15.1)
HCT VFR BLD AUTO: 40.1 % (ref 36.5–49.3)
HGB BLD-MCNC: 13.1 G/DL (ref 12–17)
IMM GRANULOCYTES # BLD AUTO: 0.03 THOUSAND/UL (ref 0–0.2)
IMM GRANULOCYTES NFR BLD AUTO: 0 % (ref 0–2)
INR PPP: 1.22 (ref 0.84–1.19)
LYMPHOCYTES # BLD AUTO: 1.3 THOUSANDS/ÂΜL (ref 0.6–4.47)
LYMPHOCYTES NFR BLD AUTO: 15 % (ref 14–44)
MCH RBC QN AUTO: 28 PG (ref 26.8–34.3)
MCHC RBC AUTO-ENTMCNC: 32.7 G/DL (ref 31.4–37.4)
MCV RBC AUTO: 86 FL (ref 82–98)
MONOCYTES # BLD AUTO: 0.87 THOUSAND/ÂΜL (ref 0.17–1.22)
MONOCYTES NFR BLD AUTO: 10 % (ref 4–12)
NEUTROPHILS # BLD AUTO: 6.62 THOUSANDS/ÂΜL (ref 1.85–7.62)
NEUTS SEG NFR BLD AUTO: 73 % (ref 43–75)
NRBC BLD AUTO-RTO: 0 /100 WBCS
PLATELET # BLD AUTO: 289 THOUSANDS/UL (ref 149–390)
PMV BLD AUTO: 10.7 FL (ref 8.9–12.7)
PROTHROMBIN TIME: 16.1 SECONDS (ref 11.6–14.5)
QRS AXIS: -40 DEGREES
QRSD INTERVAL: 94 MS
QT INTERVAL: 306 MS
QTC INTERVAL: 460 MS
RBC # BLD AUTO: 4.68 MILLION/UL (ref 3.88–5.62)
T WAVE AXIS: 84 DEGREES
TSH SERPL DL<=0.05 MIU/L-ACNC: 2.84 UIU/ML (ref 0.45–4.5)
VENTRICULAR RATE: 136 BPM
WBC # BLD AUTO: 8.98 THOUSAND/UL (ref 4.31–10.16)

## 2024-05-30 PROCEDURE — B245ZZ4 ULTRASONOGRAPHY OF LEFT HEART, TRANSESOPHAGEAL: ICD-10-PCS | Performed by: INTERNAL MEDICINE

## 2024-05-30 PROCEDURE — 84484 ASSAY OF TROPONIN QUANT: CPT

## 2024-05-30 PROCEDURE — 93005 ELECTROCARDIOGRAM TRACING: CPT

## 2024-05-30 PROCEDURE — 99232 SBSQ HOSP IP/OBS MODERATE 35: CPT | Performed by: STUDENT IN AN ORGANIZED HEALTH CARE EDUCATION/TRAINING PROGRAM

## 2024-05-30 PROCEDURE — 36415 COLL VENOUS BLD VENIPUNCTURE: CPT | Performed by: PHYSICIAN ASSISTANT

## 2024-05-30 PROCEDURE — 85025 COMPLETE CBC W/AUTO DIFF WBC: CPT | Performed by: PHYSICIAN ASSISTANT

## 2024-05-30 PROCEDURE — 93010 ELECTROCARDIOGRAM REPORT: CPT | Performed by: INTERNAL MEDICINE

## 2024-05-30 PROCEDURE — 99223 1ST HOSP IP/OBS HIGH 75: CPT | Performed by: INTERNAL MEDICINE

## 2024-05-30 PROCEDURE — 85610 PROTHROMBIN TIME: CPT | Performed by: PHYSICIAN ASSISTANT

## 2024-05-30 PROCEDURE — 85730 THROMBOPLASTIN TIME PARTIAL: CPT | Performed by: PHYSICIAN ASSISTANT

## 2024-05-30 RX ORDER — FUROSEMIDE 10 MG/ML
40 INJECTION INTRAMUSCULAR; INTRAVENOUS 2 TIMES DAILY
Status: DISCONTINUED | OUTPATIENT
Start: 2024-05-30 | End: 2024-06-03

## 2024-05-30 RX ORDER — ENOXAPARIN SODIUM 150 MG/ML
1 INJECTION SUBCUTANEOUS EVERY 12 HOURS SCHEDULED
Status: DISCONTINUED | OUTPATIENT
Start: 2024-05-30 | End: 2024-05-31

## 2024-05-30 RX ORDER — HEPARIN SODIUM 10000 [USP'U]/100ML
3-20 INJECTION, SOLUTION INTRAVENOUS
Status: DISCONTINUED | OUTPATIENT
Start: 2024-05-30 | End: 2024-05-30

## 2024-05-30 RX ORDER — MELOXICAM 7.5 MG/1
15 TABLET ORAL DAILY
Status: DISCONTINUED | OUTPATIENT
Start: 2024-05-30 | End: 2024-05-30

## 2024-05-30 RX ORDER — MELOXICAM 7.5 MG/1
15 TABLET ORAL DAILY PRN
Status: DISCONTINUED | OUTPATIENT
Start: 2024-05-30 | End: 2024-05-30

## 2024-05-30 RX ORDER — HEPARIN SODIUM 1000 [USP'U]/ML
2000 INJECTION, SOLUTION INTRAVENOUS; SUBCUTANEOUS EVERY 6 HOURS PRN
Status: DISCONTINUED | OUTPATIENT
Start: 2024-05-30 | End: 2024-05-30

## 2024-05-30 RX ORDER — LISINOPRIL 10 MG/1
10 TABLET ORAL DAILY
Status: DISCONTINUED | OUTPATIENT
Start: 2024-05-30 | End: 2024-06-04 | Stop reason: HOSPADM

## 2024-05-30 RX ORDER — ACETAMINOPHEN 325 MG/1
650 TABLET ORAL EVERY 4 HOURS PRN
Status: DISCONTINUED | OUTPATIENT
Start: 2024-05-30 | End: 2024-06-04 | Stop reason: HOSPADM

## 2024-05-30 RX ORDER — HEPARIN SODIUM 1000 [USP'U]/ML
4000 INJECTION, SOLUTION INTRAVENOUS; SUBCUTANEOUS EVERY 6 HOURS PRN
Status: DISCONTINUED | OUTPATIENT
Start: 2024-05-30 | End: 2024-05-30

## 2024-05-30 RX ORDER — HEPARIN SODIUM 1000 [USP'U]/ML
4000 INJECTION, SOLUTION INTRAVENOUS; SUBCUTANEOUS ONCE
Status: COMPLETED | OUTPATIENT
Start: 2024-05-30 | End: 2024-05-30

## 2024-05-30 RX ADMIN — DILTIAZEM HYDROCHLORIDE 5 MG/HR: 5 INJECTION INTRAVENOUS at 20:28

## 2024-05-30 RX ADMIN — METOPROLOL TARTRATE 25 MG: 25 TABLET, FILM COATED ORAL at 15:05

## 2024-05-30 RX ADMIN — HEPARIN SODIUM 11.1 UNITS/KG/HR: 10000 INJECTION, SOLUTION INTRAVENOUS at 01:37

## 2024-05-30 RX ADMIN — HEPARIN SODIUM 4000 UNITS: 1000 INJECTION INTRAVENOUS; SUBCUTANEOUS at 01:31

## 2024-05-30 RX ADMIN — METOPROLOL TARTRATE 25 MG: 25 TABLET, FILM COATED ORAL at 20:13

## 2024-05-30 RX ADMIN — DILTIAZEM HYDROCHLORIDE 5 MG/HR: 5 INJECTION INTRAVENOUS at 09:41

## 2024-05-30 RX ADMIN — ENOXAPARIN SODIUM 135 MG: 150 INJECTION SUBCUTANEOUS at 21:08

## 2024-05-30 RX ADMIN — FUROSEMIDE 40 MG: 10 INJECTION, SOLUTION INTRAMUSCULAR; INTRAVENOUS at 09:47

## 2024-05-30 RX ADMIN — LISINOPRIL 10 MG: 10 TABLET ORAL at 08:00

## 2024-05-30 RX ADMIN — FUROSEMIDE 40 MG: 10 INJECTION, SOLUTION INTRAMUSCULAR; INTRAVENOUS at 17:55

## 2024-05-30 RX ADMIN — METOPROLOL TARTRATE 25 MG: 25 TABLET, FILM COATED ORAL at 01:31

## 2024-05-30 RX ADMIN — ENOXAPARIN SODIUM 135 MG: 150 INJECTION SUBCUTANEOUS at 08:00

## 2024-05-30 RX ADMIN — METOPROLOL TARTRATE 25 MG: 25 TABLET, FILM COATED ORAL at 08:00

## 2024-05-30 RX ADMIN — FUROSEMIDE 40 MG: 10 INJECTION, SOLUTION INTRAMUSCULAR; INTRAVENOUS at 01:31

## 2024-05-30 NOTE — ASSESSMENT & PLAN NOTE
New onset afib with RVR -170s in the ER s/p 2x IV Cardizem 25mg bolus now slightly improved to 120-130ss  [id number] score 2   IV heparin gtt   Obtain echo   Start cardizem gtt   Start metoprolol tartrate 25mg q6h   Monitor telemetry   Cardiology consult appreciated

## 2024-05-30 NOTE — ASSESSMENT & PLAN NOTE
Secondary to new onset afib resulting in sub optimal cardiac output  BNP never collected   Started on IV lasix  Cardiology following  ECHO pending, follow up results

## 2024-05-30 NOTE — CONSULTS
Consultation - Cardiology   Antonio Mckeon 53 y.o. male MRN: [id number]  Unit/Bed#: ED 28 Encounter: [id number]  05/30/24  10:09 AM    Assessment/ Plan:  New onset atrial fibrillation with RVR  Heart rate appears improved now 80-low 100's  On cardizem gtt 5mg, continue  Continue lopressor  Continue lovenox  Monitor tele  Check TSH  Echo pending  [id number] = 2, will need oral AC prior DC3  Plan for SPENCER/cardioversion tomorrow    2. Acute CHF, systolic vs diastolic to be determined  Still overloaded  Echo pending  Net -700mL  Daily weights, salt restriction, strict I's and O's  Continue lasix 40mg IV BID    3. Hypertensive Urgency  POA  BP now stable 140/99  Continue cardizem, lisinopril, lopressor     4.  Rheumatoid arthritis  Follows with rheumatology outpatient    5.  Obesity  Weight loss advised      History of Present Illness   Physician Requesting Consult: Oliver Apple MD    Reason for Consult / Principal Problem: new afib, chf    HPI: Antonio Mckeon is a 53 y.o. year old male who presents with lower extremity edema bilaterally for the last 1 week.  Patient seen by PCP because of this.  PCP noted abdominal distention, noted to be in A-fib with RVR (new) and instructed to go to the ER.  Patient had no chest pain, shortness of breath.  Patient states he felt like he was in A-fib as his mother has a history and intermittently complained of palpitations.  He states he had never formally been diagnosed.  Patient states he had palpitations that started around 8 months ago and then again till the last couple of weeks.  Patient admits to eating a lot of salty foods including eating out.  Patient denies any orthopnea, PND.  Patient states since being here he is feeling much better, his lower extremity swelling has improved.     PMH: Hypertension, rheumatoid arthritis    Inpatient consult to Cardiology  Consult performed by: Yessica Wesley PA-C  Consult ordered by: Charlee Arreaga PA-C        EKG: A-fib with  RVR, LAD, incomplete right bundle branch block      Review of Systems   Constitutional: Negative.    Respiratory: Negative.     Cardiovascular:  Positive for leg swelling.   Musculoskeletal: Negative.    Neurological: Negative.    Hematological: Negative.    Psychiatric/Behavioral: Negative.     All other systems reviewed and are negative.      Historical Information   No past medical history on file.  Past Surgical History:   Procedure Laterality Date    CARDIAC CATHETERIZATION N/A 2010    KNEE ARTHROSCOPY W/ MENISCAL REPAIR Bilateral     ROTATOR CUFF REPAIR Left      Social History     Substance and Sexual Activity   Alcohol Use Yes    Comment: sobor x1 mo 5/2022     Social History     Substance and Sexual Activity   Drug Use No     Social History     Tobacco Use   Smoking Status Never   Smokeless Tobacco Never       Family History:   Family History   Problem Relation Age of Onset    Diabetes Mother     Heart disease Mother     Rheum arthritis Mother     Alcohol abuse Father     Stomach cancer Father     Breast cancer Maternal Grandmother        Meds/Allergies   all current active meds have been reviewed and current meds:   Current Facility-Administered Medications   Medication Dose Route Frequency    acetaminophen (TYLENOL) tablet 650 mg  650 mg Oral Q4H PRN    diltiazem (CARDIZEM) 125 mg in sodium chloride 0.9 % 125 mL infusion  5 mg/hr Intravenous Continuous    enoxaparin (LOVENOX) subcutaneous injection 135 mg  1 mg/kg Subcutaneous Q12H FEMI    furosemide (LASIX) injection 40 mg  40 mg Intravenous BID    lisinopril (ZESTRIL) tablet 10 mg  10 mg Oral Daily    metoprolol tartrate (LOPRESSOR) tablet 25 mg  25 mg Oral Q6H     No Known Allergies    Objective   Vitals: Blood pressure 140/99, pulse 96, temperature 98.1 °F (36.7 °C), temperature source Oral, resp. rate (!) 26, SpO2 94%., There is no height or weight on file to calculate BMI.,   Orthostatic Blood Pressures      Flowsheet Row Most Recent Value   Blood  Pressure 140/99 filed at 2024 0830   Patient Position - Orthostatic VS Sitting filed at 2024 0830            Systolic (24hrs), Avg:160 , Min:124 , Max:220     Diastolic (24hrs), Av, Min:83, Max:139        Intake/Output Summary (Last 24 hours) at 2024 1009  Last data filed at 2024 0400  Gross per 24 hour   Intake --   Output 700 ml   Net -700 ml       Invasive Devices       Peripheral Intravenous Line  Duration             Peripheral IV 24 Left Antecubital <1 day    Peripheral IV 24 Right;Ventral (anterior) Hand <1 day                        Physical Exam:  GEN: Alert and oriented x 3, in no acute distress.  Well appearing and well nourished.   HEENT: Sclera anicteric, conjunctivae pink, mucous membranes moist. Oropharynx clear.   NECK: Supple, no carotid bruits, no significant JVD. Trachea midline, no thyromegaly.   HEART: Irregularly irregular, normal S1 and S2, no murmurs, clicks, gallops or rubs. PMI nondisplaced, no thrills.   LUNGS: decreased breath sounds with crackles noted at the right base. no wheezes, rales, or rhonchi. No increased work of breathing or signs of respiratory distress.   ABDOMEN: Soft, nontender, nondistended, normoactive bowel sounds.   EXTREMITIES: +2 edema, Skin warm and well perfused, no clubbing, cyanosis  NEURO: No focal findings. Normal speech. Mood and affect normal.   SKIN: Normal without suspicious lesions on exposed skin.      Lab Results:     Troponins:   Results from last 7 days   Lab Units 24  0044 24  2246 24   HS TNI 0HR ng/L  --   --  28   HS TNI 2HR ng/L  --  28  --    HS TNI 4HR ng/L 23  --   --    HSTNI D4 ng/L -5  --   --        CBC with diff:   Results from last 7 days   Lab Units 24  0748 24   WBC Thousand/uL 8.98 10.00   HEMOGLOBIN g/dL 13.1 14.6   HEMATOCRIT % 40.1 46.6   MCV fL 86 88   PLATELETS Thousands/uL 289 320   RBC Million/uL 4.68 5.32   MCH pg 28.0 27.4   MCHC g/dL 32.7 31.3*    RDW % 14.4 14.4   MPV fL 10.7 11.2   NRBC AUTO /100 WBCs 0 0         CMP:   Results from last 7 days   Lab Units 05/29/24 2022   POTASSIUM mmol/L 3.7   CHLORIDE mmol/L 105   CO2 mmol/L 25   BUN mg/dL 14   CREATININE mg/dL 0.96   CALCIUM mg/dL 9.6   AST U/L 29   ALT U/L 35   ALK PHOS U/L 93   EGFR ml/min/1.73sq m 88

## 2024-05-30 NOTE — PROGRESS NOTES
[facility]  Progress Note  Name: Antonio Mckeon I  MRN: [id number]  Unit/Bed#: ED 28 I Date of Admission: 5/29/2024   Date of Service: 5/30/2024 I Hospital Day: 1    Assessment & Plan   * Volume overload  Assessment & Plan  Patient presents with bilateral LE edema x 1 week. Seen by PCP today and instructed to go to the ER due to new onest afib with RVR and concern for volume overload  On physical exam, patient noted to have abdominal and peripheral edema. CXR with pulmonary edema on my review.   Likely new onset CHF exacerbation from new onset afib with RVR   Obtain echocardiogram   IV Lasix 40mg BID   I/O, Daily weights   Sodium and fluid restricted diet   Cardiology consult     New onset atrial fibrillation with RVR (HCC)  Assessment & Plan  New onset afib with RVR -170s in the ER s/p 2x IV Cardizem 25mg bolus now slightly improved to 120-130ss  [id number] score 2   IV heparin gtt   Obtain echo   Start cardizem gtt   Start metoprolol tartrate 25mg q6h   Monitor telemetry   Cardiology consult appreciated               VTE Pharmacologic Prophylaxis: VTE Score: 3 Moderate Risk (Score 3-4) - Pharmacological DVT Prophylaxis Ordered: enoxaparin (Lovenox).    Mobility:      -[facility] Goal achieved. Continue to encourage appropriate mobility.    Patient Centered Rounds: I performed bedside rounds with nursing staff today.   Discussions with Specialists or Other Care Team Provider: maria isabel    Education and Discussions with Family / Patient: Patient declined call to .     Total Time Spent on Date of Encounter in care of patient: 30+ mins. This time was spent on one or more of the following: performing physical exam; counseling and coordination of care; obtaining or reviewing history; documenting in the medical record; reviewing/ordering tests, medications or procedures; communicating with other healthcare professionals and discussing with patient's family/caregivers.    Current Length  of Stay: 1 day(s)  Current Patient Status: Inpatient   Certification Statement: The patient will continue to require additional inpatient hospital stay due to afib control, cardiology consult  Discharge Plan: Anticipate discharge in 24-48 hrs to home.    Code Status: Level 1 - Full Code    Subjective:   Patient feels better     Objective:     Vitals:   Temp (24hrs), Av.4 °F (36.9 °C), Min:98.1 °F (36.7 °C), Max:98.7 °F (37.1 °C)    Temp:  [98.1 °F (36.7 °C)-98.7 °F (37.1 °C)] 98.1 °F (36.7 °C)  HR:  [] 90  Resp:  [16-34] 26  BP: (124-220)/() 136/97  SpO2:  [94 %-99 %] 95 %  There is no height or weight on file to calculate BMI.     Input and Output Summary (last 24 hours):     Intake/Output Summary (Last 24 hours) at 2024 1101  Last data filed at 2024 0400  Gross per 24 hour   Intake --   Output 700 ml   Net -700 ml       Physical Exam:   Physical Exam  Constitutional:       General: He is not in acute distress.     Appearance: Normal appearance. He is not toxic-appearing.   Cardiovascular:      Rate and Rhythm: Normal rate and regular rhythm.      Heart sounds: Normal heart sounds. No murmur heard.  Pulmonary:      Effort: Pulmonary effort is normal. No respiratory distress.      Breath sounds: Normal breath sounds. No wheezing.   Abdominal:      General: Abdomen is flat. There is no distension.      Palpations: Abdomen is soft.      Tenderness: There is no abdominal tenderness.   Neurological:      General: No focal deficit present.      Mental Status: He is alert and oriented to person, place, and time. Mental status is at baseline.      Motor: No weakness.          Additional Data:     Labs:  Results from last 7 days   Lab Units 24  0748   WBC Thousand/uL 8.98   HEMOGLOBIN g/dL 13.1   HEMATOCRIT % 40.1   PLATELETS Thousands/uL 289   SEGS PCT % 73   LYMPHO PCT % 15   MONO PCT % 10   EOS PCT % 1     Results from last 7 days   Lab Units 24   SODIUM mmol/L 140   POTASSIUM  mmol/L 3.7   CHLORIDE mmol/L 105   CO2 mmol/L 25   BUN mg/dL 14   CREATININE mg/dL 0.96   ANION GAP mmol/L 10   CALCIUM mg/dL 9.6   ALBUMIN g/dL 4.4   TOTAL BILIRUBIN mg/dL 1.51*   ALK PHOS U/L 93   ALT U/L 35   AST U/L 29   GLUCOSE RANDOM mg/dL 114     Results from last 7 days   Lab Units 05/30/24  0044   INR  1.22*                   Lines/Drains:  Invasive Devices       Peripheral Intravenous Line  Duration             Peripheral IV 05/29/24 Left Antecubital <1 day    Peripheral IV 05/30/24 Right;Ventral (anterior) Hand <1 day                      Telemetry:  Telemetry Orders (From admission, onward)               24 Hour Telemetry Monitoring  Continuous x 24 Hours (Telem)        Question:  Reason for 24 Hour Telemetry  Answer:  Arrhythmias requiring acute medical intervention / PPM or ICD malfunction                              Imaging: No pertinent imaging reviewed.    Recent Cultures (last 7 days):         Last 24 Hours Medication List:   Current Facility-Administered Medications   Medication Dose Route Frequency Provider Last Rate    acetaminophen  650 mg Oral Q4H PRN Charlee Arreaga PA-C      diltiazem  5 mg/hr Intravenous Continuous Oliver Apple MD 5 mg/hr (05/30/24 0941)    enoxaparin  1 mg/kg Subcutaneous Q12H FEMI Oliver Apple MD      furosemide  40 mg Intravenous BID Charlee Arreaga PA-C      lisinopril  10 mg Oral Daily Charlee Arreaga PA-C      metoprolol tartrate  25 mg Oral Q6H Charlee Arreaga PA-C          Today, Patient Was Seen By: Oliver Coles MD    **Please Note: This note may have been constructed using a voice recognition system.**

## 2024-05-30 NOTE — PLAN OF CARE
Problem: PAIN - ADULT  Goal: Verbalizes/displays adequate comfort level or baseline comfort level  Description: Interventions:  - Encourage patient to monitor pain and request assistance  - Assess pain using appropriate pain scale  - Administer analgesics based on type and severity of pain and evaluate response  - Implement non-pharmacological measures as appropriate and evaluate response  - Consider cultural and social influences on pain and pain management  - Notify physician/advanced practitioner if interventions unsuccessful or patient reports new pain  Outcome: Progressing     Problem: INFECTION - ADULT  Goal: Absence or prevention of progression during hospitalization  Description: INTERVENTIONS:  - Assess and monitor for signs and symptoms of infection  - Monitor lab/diagnostic results  - Monitor all insertion sites, i.e. indwelling lines, tubes, and drains  - Monitor endotracheal if appropriate and nasal secretions for changes in amount and color  - [place] appropriate cooling/warming therapies per order  - Administer medications as ordered  - Instruct and encourage patient and family to use good hand hygiene technique  - Identify and instruct in appropriate isolation precautions for identified infection/condition  Outcome: Progressing     Problem: SAFETY ADULT  Goal: Patient will remain free of falls  Description: INTERVENTIONS:  - Educate patient/family on patient safety including physical limitations  - Instruct patient to call for assistance with activity   - Consult OT/PT to assist with strengthening/mobility   - Keep Call bell within reach  - Keep bed low and locked with side rails adjusted as appropriate  - Keep care items and personal belongings within reach  - Initiate and maintain comfort rounds  - Make Fall Risk Sign visible to staff  - Offer Toileting every 2 Hours, in advance of need  - Initiate/Maintain bed alarm  - Obtain necessary fall risk management equipment  - Apply yellow socks and  bracelet for high fall risk patients  - Consider moving patient to room near nurses station  Outcome: Progressing  Goal: Maintain or return to baseline ADL function  Description: INTERVENTIONS:  -  Assess patient's ability to carry out ADLs; assess patient's baseline for ADL function and identify physical deficits which impact ability to perform ADLs (bathing, care of mouth/teeth, toileting, grooming, dressing, etc.)  - Assess/evaluate cause of self-care deficits   - Assess range of motion  - Assess patient's mobility; develop plan if impaired  - Assess patient's need for assistive devices and provide as appropriate  - Encourage maximum independence but intervene and supervise when necessary  - Involve family in performance of ADLs  - Assess for home care needs following discharge   - Consider OT consult to assist with ADL evaluation and planning for discharge  - Provide patient education as appropriate  Outcome: Progressing  Goal: Maintains/Returns to pre admission functional level  Description: INTERVENTIONS:  - Perform AM-PAC 6 Click Basic Mobility/ Daily Activity assessment daily.  - Set and communicate daily mobility goal to care team and patient/family/caregiver.   - Collaborate with rehabilitation services on mobility goals if consulted  - Perform Range of Motion 3 times a day.  - Reposition patient every 2 hours.  - Dangle patient 3 times a day  - Stand patient 3 times a day  - Ambulate patient 3 times a day  - Out of bed to chair 3 times a day   - Out of bed for meals 3 times a day  - Out of bed for toileting  - Record patient progress and toleration of activity level   Outcome: Progressing     Problem: DISCHARGE PLANNING  Goal: Discharge to home or other facility with appropriate resources  Description: INTERVENTIONS:  - Identify barriers to discharge w/patient and caregiver  - Arrange for needed discharge resources and transportation as appropriate  - Identify discharge learning needs (meds, wound care,  etc.)  - Arrange for interpretive services to assist at discharge as needed  - Refer to Case Management Department for coordinating discharge planning if the patient needs post-hospital services based on physician/advanced practitioner order or complex needs related to functional status, cognitive ability, or social support system  Outcome: Progressing     Problem: Knowledge Deficit  Goal: Patient/family/caregiver demonstrates understanding of disease process, treatment plan, medications, and discharge instructions  Description: Complete learning assessment and assess knowledge base.  Interventions:  - Provide teaching at level of understanding  - Provide teaching via preferred learning methods  Outcome: Progressing

## 2024-05-30 NOTE — ASSESSMENT & PLAN NOTE
BP elevated in the ER   Home meds: lisinopril 10mg daily   Receiving IV diuresis and Cardizem gtt to control HR and for volume overload   Start metoprolol tartrate 25mg q6h   Monitor BP closely

## 2024-05-30 NOTE — ED NOTES
Pt placed on lifepack/ pads applied to front and back of chest. Pt states his HR is high related to anxiety. Per provider give pt atarax PO.      Shira Singh RN  05/29/24 2030

## 2024-05-30 NOTE — ASSESSMENT & PLAN NOTE
Patient presents with bilateral LE edema x 1 week. Seen by PCP today and instructed to go to the ER due to new onest afib with RVR and concern for volume overload  On physical exam, patient noted to have abdominal and peripheral edema. CXR with pulmonary edema on my review.   Likely new onset CHF exacerbation from new onset afib with RVR   Obtain echocardiogram   IV Lasix 40mg BID   I/O, Daily weights   Sodium and fluid restricted diet   Cardiology consult

## 2024-05-30 NOTE — H&P
[facility]  H&P  Name: Antonio Mckeon 53 y.o. male I MRN: [id number]  Unit/Bed#: ED 28 I Date of Admission: 5/29/2024   Date of Service: 5/30/2024 I Hospital Day: 1      Assessment & Plan   * Volume overload  Assessment & Plan  Patient presents with bilateral LE edema x 1 week. Seen by PCP today and instructed to go to the ER due to new onest afib with RVR and concern for volume overload  On physical exam, patient noted to have abdominal and peripheral edema. CXR with pulmonary edema on my review.   Likely new onset CHF exacerbation from new onset afib with RVR   Obtain echocardiogram   IV Lasix 40mg BID   I/O, Daily weights   Sodium and fluid restricted diet   Cardiology consult     New onset atrial fibrillation with RVR (HCC)  Assessment & Plan  New onset afib with RVR -170s in the ER s/p 2x IV Cardizem 25mg bolus now slightly improved to 120-130ss  [id number] score 2   IV heparin gtt   Obtain echo   Start cardizem gtt   Start metoprolol tartrate 25mg q6h   Monitor telemetry   Cardiology consult appreciated    Primary hypertension  Assessment & Plan  BP elevated in the ER   Home meds: lisinopril 10mg daily   Receiving IV diuresis and Cardizem gtt to control HR and for volume overload   Start metoprolol tartrate 25mg q6h   Monitor BP closely    Rheumatoid arthritis of multiple sites with negative rheumatoid factor (HCC)  Assessment & Plan  PRN meloxicam          VTE Pharmacologic Prophylaxis: VTE Score: 3 Moderate Risk (Score 3-4) - Pharmacological DVT Prophylaxis Ordered: heparin drip.  Code Status: Level 1 - Full Code   Discussion with family: Updated  (significant other) at bedside.    Anticipated Length of Stay: Patient will be admitted on an inpatient basis with an anticipated length of stay of greater than 2 midnights secondary to volume overload, new onset afib with rvr.    Total Time Spent on Date of Encounter in care of patient: 60 mins. This time was spent on  one or more of the following: performing physical exam; counseling and coordination of care; obtaining or reviewing history; documenting in the medical record; reviewing/ordering tests, medications or procedures; communicating with other healthcare professionals and discussing with patient's family/caregivers.    Chief Complaint: bilateral LE edema    History of Present Illness:  Antonio Mckeon is a 53 y.o. male with a PMH of HTN, RA who presents with bilateral LE edema x 1 week. Patient initially presented to PCP with concerns of bilateral lower extremity edema.  Patient's PCP noticed that he also had abdominal edema and was noted to be in A-fib with RVR.  Patient was instructed to go to the ER.  Patient states that he denies any chest pain, shortness of breath, palpitations.  However, he states that he had a feeling that he has A-fib since his mom had A-fib and he intermittently has palpitations, however this was never formally diagnosed by a doctor.    Review of Systems:  Review of Systems   Constitutional:  Negative for chills and fever.   Respiratory:  Negative for shortness of breath.    Cardiovascular:  Positive for palpitations and leg swelling. Negative for chest pain.   Gastrointestinal:  Negative for abdominal pain, nausea and vomiting.   Psychiatric/Behavioral:  Negative for confusion.         Past Medical and Surgical History:   No past medical history on file.    Past Surgical History:   Procedure Laterality Date    CARDIAC CATHETERIZATION N/A 2010    KNEE ARTHROSCOPY W/ MENISCAL REPAIR Bilateral     ROTATOR CUFF REPAIR Left        Meds/Allergies:  Prior to Admission medications    Medication Sig Start Date End Date Taking? Authorizing Provider   hydroxychloroquine (PLAQUENIL) 200 mg tablet Take 1 tablet (200 mg total) by mouth 2 (two) times a day with meals  Patient not taking: Reported on 5/29/2024 4/26/23 10/26/23  Alfredo Lindo PA-C   lisinopril (ZESTRIL) 10 mg tablet TAKE 1 TABLET(10 MG) BY  MOUTH DAILY 2/21/24   Kayla Fall PA-C   meloxicam (MOBIC) 15 mg tablet TAKE 1 TABLET(15 MG) BY MOUTH DAILY 1/5/24   Louis Ramsey MD     I have reviewed home medications with patient personally.    Allergies: No Known Allergies    Social History:  Marital Status: Single   Patient Pre-hospital Living Situation: Home  Patient Pre-hospital Level of Mobility: walks  Patient Pre-hospital Diet Restrictions: none  Substance Use History:   Social History     Substance and Sexual Activity   Alcohol Use Yes    Comment: sobor x1 mo 5/2022     Social History     Tobacco Use   Smoking Status Never   Smokeless Tobacco Never     Social History     Substance and Sexual Activity   Drug Use No       Family History:  Family History   Problem Relation Age of Onset    Diabetes Mother     Heart disease Mother     Rheum arthritis Mother     Alcohol abuse Father     Stomach cancer Father     Breast cancer Maternal Grandmother        Physical Exam:     Vitals:   Blood Pressure: 124/83 (05/30/24 0300)  Pulse: 89 (05/30/24 0300)  Temperature: 98.1 °F (36.7 °C) (05/29/24 2003)  Temp Source: Oral (05/29/24 2003)  Respirations: 16 (05/30/24 0300)  SpO2: 95 % (05/30/24 0300)    Physical Exam  Constitutional:       General: He is not in acute distress.     Appearance: He is obese.   HENT:      Mouth/Throat:      Mouth: Mucous membranes are dry.   Cardiovascular:      Rate and Rhythm: Tachycardia present. Rhythm irregular.      Heart sounds: Normal heart sounds.   Pulmonary:      Breath sounds: Normal breath sounds.   Abdominal:      General: Abdomen is flat. Bowel sounds are normal. There is distension.      Palpations: Abdomen is soft.      Tenderness: There is no abdominal tenderness.   Musculoskeletal:      Right lower leg: Edema present.      Left lower leg: Edema present.   Skin:     General: Skin is warm and dry.   Neurological:      General: No focal deficit present.      Mental Status: He is alert and oriented to person,  place, and time.   Psychiatric:         Mood and Affect: Mood normal.          Additional Data:     Lab Results:  Results from last 7 days   Lab Units 05/29/24 2022   WBC Thousand/uL 10.00   HEMOGLOBIN g/dL 14.6   HEMATOCRIT % 46.6   PLATELETS Thousands/uL 320   SEGS PCT % 69   LYMPHO PCT % 17   MONO PCT % 11   EOS PCT % 2     Results from last 7 days   Lab Units 05/29/24 2022   SODIUM mmol/L 140   POTASSIUM mmol/L 3.7   CHLORIDE mmol/L 105   CO2 mmol/L 25   BUN mg/dL 14   CREATININE mg/dL 0.96   ANION GAP mmol/L 10   CALCIUM mg/dL 9.6   ALBUMIN g/dL 4.4   TOTAL BILIRUBIN mg/dL 1.51*   ALK PHOS U/L 93   ALT U/L 35   AST U/L 29   GLUCOSE RANDOM mg/dL 114     Results from last 7 days   Lab Units 05/30/24  0044   INR  1.22*                   Lines/Drains:  Invasive Devices       Peripheral Intravenous Line  Duration             Peripheral IV 05/29/24 Left Antecubital <1 day    Peripheral IV 05/30/24 Right;Ventral (anterior) Hand <1 day                        Imaging: Personally reviewed the following imaging: chest xray  XR chest 1 view portable   ED Interpretation by Nevin Gallardo PA-C (05/29 2319)   Findings consistent with pulmonary edema and likely CHF          EKG and Other Studies Reviewed on Admission:   EKG: Atrial fibrillation. .    ** Please Note: This note has been constructed using a voice recognition system. **

## 2024-05-30 NOTE — UTILIZATION REVIEW
"Initial Clinical Review    Admission: Date/Time/Statement:   Admission Orders (From admission, onward)       Ordered        05/29/24 2345  INPATIENT ADMISSION  Once                          Orders Placed This Encounter   Procedures    INPATIENT ADMISSION     Standing Status:   Standing     Number of Occurrences:   1     Order Specific Question:   Level of Care     Answer:   Med Surg [16]     Order Specific Question:   Estimated length of stay     Answer:   More than 2 Midnights     Order Specific Question:   Certification     Answer:   I certify that inpatient services are medically necessary for this patient for a duration of greater than two midnights. See H&P and MD Progress Notes for additional information about the patient's course of treatment.     ED Arrival Information       Expected   -    Arrival   5/29/2024 19:50    Acuity   Emergent              Means of arrival   Walk-In    Escorted by   Self    Service   Hospitalist    Admission type   Emergency              Arrival complaint   Leg Swelling             Chief Complaint   Patient presents with    Leg Swelling     Leg swelling x couple of days, + 2 edema, + pulses. Phx of afib for which he \"never followed up with\" takes no  BT, denies hx CHF. Patient  says he knows he has afib but was never diagnosed/never followed up     Mentions that him pcp sent him to ER  after he was seen for a panic attack  earlier today       Initial Presentation: 53 y.o. male who presented self from PCP to [facility] ED. Inpatient admission for evaluation and treatment of volume overload. PMHx: HTN, rheumatoid arthritis. Presented w/ b/l LE edema for a week, as well as abdominal edema. Noted to be in afib w/ RVR (new diagnosis). On exam, dry mucous membranes, tachycardic, irregular rhythm, abdominal distention, b/l LE edema. CXR consistent w/ pulmonary edema. EKG afib w/ rate 136. Plan: IV lasix 40 mg BID, DW, I&O, check echo, sodium/fluid restriction, IV heparin gtt, " cardizem gtt, start metoprolol tartrate 25 mg q6h, telemetry, Trend labs, replete electrolytes as needed; supportive care. Cardiology consulted.    Anticipated Length of Stay/Certification Statement: Patient will be admitted on an inpatient basis with an anticipated length of stay of greater than 2 midnights secondary to volume overload, new onset afib with rvr.       Date: 05/30/24   Day 2: Pt reports feeling better. Exam: irregularly irregular rhythm, decreased breath sounds, crackles in R base, b/l LE 2+ edema. Plan: telemetry, cardizem gtt, start metoprolol tartrate 25 mg q6h, DW, I&O, IV lasix 40 mg BID, echo, sodium/fluid restrction.    Cardiology: Pt w/ New afib w/ RVR. New abdominal and b/l LE edema. Continue cardizem gtt, continue lopressor and Lovenox, telemetry, check TSH, echo, [id number] = 2 - will require PO AC, SPENCER/cardioversion tomorrow, echo. Sodium restriction, DW, I&O, IV lasix BID.     ED Triage Vitals [05/29/24 2003]   Temperature Pulse Respirations Blood Pressure SpO2   98.1 °F (36.7 °C) (!) 167 20 (!) 191/139 99 %      Temp Source Heart Rate Source Patient Position - Orthostatic VS BP Location FiO2 (%)   Oral Monitor Sitting Left arm --      Pain Score       --          Wt Readings from Last 1 Encounters:   05/29/24 131 kg (288 lb)     Additional Vital Signs:   Date/Time Pulse Resp BP MAP (mmHg) SpO2 O2 Device   05/30/24 0830 96 26 Abnormal  140/99 113 94 % None (Room air)   05/30/24 0800 102 34 Abnormal  137/99 110 95 % None (Room air)   05/30/24 0713 105 17 138/103 Abnormal  -- 95 % None (Room air)   05/30/24 0300 89 16 124/83 98 95 % None (Room air)   05/30/24 0245 98 18 -- -- 95 % None (Room air)   05/30/24 0143 133 Abnormal  18 166/95 121 95 % None (Room air)   05/30/24 0045 132 Abnormal  -- -- -- -- --   05/30/24 0030 126 Abnormal  20 161/106 Abnormal  126 95 % None (Room air)   05/30/24 0000 117 Abnormal  16 141/88 108 95 % None (Room air)   05/29/24 2349 115 Abnormal  16 139/100 -- 96  % None (Room air)   05/29/24 2330 125 Abnormal  25 Abnormal  147/95 110 95 % None (Room air)   05/29/24 2230 121 Abnormal  22 185/103 Abnormal  126 94 % --   05/29/24 2214 119 Abnormal  28 Abnormal  151/111 Abnormal  -- 94 % --   05/29/24 2202 152 Abnormal  29 Abnormal  -- -- 94 % --   05/29/24 2130 148 Abnormal  30 Abnormal  165/100 120 94 % --   05/29/24 2030 165 Abnormal  29 Abnormal  180/131 Abnormal  146 96 % --   05/29/24 2023 166 Abnormal  20 170/139 Abnormal  -- 99 % --     Pertinent Labs/Diagnostic Test Results:   5/29 - EKG  Atrial fibrillation with rapid ventricular response  Left axis deviation  Incomplete right bundle branch block    XR chest 1 view portable   ED Interpretation by Nevin Gallardo PA-C (05/29 2319)   Findings consistent with pulmonary edema and likely CHF      Final Result by Aris Kinsey DO (05/30 1528)      Mild central vascular congestion may in part be related to poor inspiration. However, findings suspicious for mild pulmonary edema. There is a moderately large left-sided pleural effusion identified.            Workstation performed: [id number]               Results from last 7 days   Lab Units 05/30/24  0748 05/29/24 2022   WBC Thousand/uL 8.98 10.00   HEMOGLOBIN g/dL 13.1 14.6   HEMATOCRIT % 40.1 46.6   PLATELETS Thousands/uL 289 320   TOTAL NEUT ABS Thousands/µL 6.62 7.02         Results from last 7 days   Lab Units 05/29/24 2022   SODIUM mmol/L 140   POTASSIUM mmol/L 3.7   CHLORIDE mmol/L 105   CO2 mmol/L 25   ANION GAP mmol/L 10   BUN mg/dL 14   CREATININE mg/dL 0.96   EGFR ml/min/1.73sq m 88   CALCIUM mg/dL 9.6     Results from last 7 days   Lab Units 05/29/24 2022   AST U/L 29   ALT U/L 35   ALK PHOS U/L 93   TOTAL PROTEIN g/dL 7.4   ALBUMIN g/dL 4.4   TOTAL BILIRUBIN mg/dL 1.51*         Results from last 7 days   Lab Units 05/29/24 2022   GLUCOSE RANDOM mg/dL 114     Results from last 7 days   Lab Units 05/30/24  0044 05/29/24  2246 05/29/24 2022   HS  TNI 0HR ng/L  --   --  28   HS TNI 2HR ng/L  --  28  --    HSTNI D2 ng/L  --  0  --    HS TNI 4HR ng/L 23  --   --    HSTNI D4 ng/L -5  --   --          Results from last 7 days   Lab Units 05/30/24  0748 05/30/24 0044   PROTIME seconds  --  16.1*   INR   --  1.22*   PTT seconds 43* 35     Results from last 7 days   Lab Units 05/29/24 2022   BNP pg/mL 562*         ED Treatment:   Medication Administration from 05/29/2024 1950 to 05/30/2024 0933         Date/Time Order Dose Route Action     05/29/2024 2032 EDT hydrOXYzine HCL (ATARAX) tablet 25 mg 25 mg Oral Given     05/29/2024 2100 EDT diltiazem (CARDIZEM) injection 25 mg 25 mg Intravenous Given     05/29/2024 2203 EDT diltiazem (CARDIZEM) injection 25 mg 25 mg Intravenous Given     05/30/2024 0045 EDT diltiazem (CARDIZEM) 125 mg in sodium chloride 0.9 % 125 mL infusion 5 mg/hr Intravenous Rate/Dose Change     05/29/2024 2336 EDT diltiazem (CARDIZEM) 125 mg in sodium chloride 0.9 % 125 mL infusion 5 mg/hr Intravenous New Bag     05/29/2024 2336 EDT diltiazem (CARDIZEM) injection 20 mg 20 mg Intravenous Given     05/29/2024 2310 EDT LORazepam (ATIVAN) tablet 0.5 mg 0.5 mg Oral Given     05/30/2024 0800 EDT lisinopril (ZESTRIL) tablet 10 mg 10 mg Oral Given     05/30/2024 0131 EDT furosemide (LASIX) injection 40 mg 40 mg Intravenous Given     05/30/2024 0800 EDT metoprolol tartrate (LOPRESSOR) tablet 25 mg 25 mg Oral Given     05/30/2024 0131 EDT metoprolol tartrate (LOPRESSOR) tablet 25 mg 25 mg Oral Given     05/30/2024 0131 EDT heparin (porcine) injection 4,000 Units 4,000 Units Intravenous Given     05/30/2024 0137 EDT heparin (porcine) 25,000 units in 0.45% NaCl 250 mL infusion (premix) 11.1 Units/kg/hr Intravenous New Bag     05/30/2024 0800 EDT enoxaparin (LOVENOX) subcutaneous injection 135 mg 135 mg Subcutaneous Given            Present on Admission:   Primary hypertension   Rheumatoid arthritis of multiple sites with negative rheumatoid factor  ([facility])      Admitting Diagnosis: Leg swelling [M79.89]  Age/Sex: 53 y.o. male  Admission Orders:  Cardiac Diet w/ 2 gm sodium restriction.  1800 mL fluid restriction.  Up as tolerated.  Telemetry.  DW. I&O. SCDs.    Scheduled Medications:  enoxaparin, 1 mg/kg, Subcutaneous, Q12H FEMI  furosemide, 40 mg, Intravenous, BID  lisinopril, 10 mg, Oral, Daily  metoprolol tartrate, 25 mg, Oral, Q6H    Continuous IV Infusions:  diltiazem, 1-15 mg/hr, Intravenous, Titrated    PRN Meds:  acetaminophen, 650 mg, Oral, Q4H PRN        IP CONSULT TO NUTRITION SERVICES  IP CONSULT TO CARDIOLOGY    Network Utilization Review Department  ATTENTION: Please call with any questions or concerns to [phone] and carefully listen to the prompts so that you are directed to the right person. All voicemails are confidential.   For Discharge needs, contact Care Management DC Support Team at [phone] opt. 2  Send all requests for admission clinical reviews, approved or denied determinations and any other requests to dedicated fax number below belonging to the [facility] where the patient is receiving treatment. List of dedicated fax numbers for the Facilities:  FACILITY NAME UR FAX NUMBER   ADMISSION DENIALS (Administrative/Medical Necessity) [phone]   DISCHARGE SUPPORT TEAM (NETWORK) [phone]   PARENT CHILD HEALTH (Maternity/NICU/Pediatrics) [phone]   [facility] [phone]   [facility] [phone]   [facility] [phone]   [facility] [phone]   [facility] [phone]   [facility] [phone]   [facility] [phone]   [facility] [phone]   [facility] [phone]   [facility] [phone]   [facility]  [phone]   [facility] [phone]

## 2024-05-30 NOTE — CASE MANAGEMENT
Case Management Assessment & Discharge Planning Note    Patient name Antonio Mckeon  Location 2 [facility] 283/2 E 283-01 MRN [id number]  : 1970 Date 2024       Current Admission Date: 2024  Current Admission Diagnosis:Volume overload   Patient Active Problem List    Diagnosis Date Noted Date Diagnosed    Volume overload 2024     New onset atrial fibrillation with RVR (HCC) 2024     Annual physical exam 10/26/2023     Rheumatoid arthritis of multiple sites with negative rheumatoid factor (HCC) 10/26/2023     Primary hypertension 10/26/2023     Intermittent palpitations 10/26/2023     Pain in both hands 2022       LOS (days): 1  Geometric Mean LOS (GMLOS) (days):   Days to GMLOS:     OBJECTIVE:    Risk of Unplanned Readmission Score: 7.69         Current admission status: Inpatient       Preferred Pharmacy:   [id number]  [place] - [address]  [address] [address]  Phone: [phone] Fax: [phone]    [id number]  [place] - [address] [facility]  [address] [facility] [address]  Phone: [phone] Fax: [phone]    Primary Care Provider: Kayla Fall PA-C    Primary Insurance: [employer]  Secondary Insurance:     ASSESSMENT:  Active Health Care Proxies    There are no active Health Care Proxies on file.                 Readmission Root Cause  30 Day Readmission: No    Patient Information  Admitted from:: Home  Mental Status: Alert  During Assessment patient was accompanied by: Not accompanied during assessment  Assessment information provided by:: Patient  Primary Caregiver: Self  Support Systems: Self, Spouse/significant other  County of Residence: [place]  What city do you live in?: [place]  Home entry access options. Select all that apply.: No steps to enter home  Type of Current Residence: [facility]  Living Arrangements: Lives w/ Spouse/significant other  Is patient a ?:  No    Activities of Daily Living Prior to Admission  Functional Status: Independent  Completes ADLs independently?: Yes  Ambulates independently?: Yes  Does patient use assisted devices?: No  Does patient currently own DME?: No  Does patient have a history of Outpatient Therapy (PT/OT)?: Yes  Does the patient have a history of Short-Term Rehab?: No  Does patient have a history of HHC?: No  Does patient currently have HHC?: No         Patient Information Continued  Income Source: Employed  Does patient have prescription coverage?: Yes  Does patient receive dialysis treatments?: No  Does patient have a history of substance abuse?: Yes, Currently using  Current substance use preference: Alcohol/ETOH  Historical substance use preference: Alcohol/ETOH  Is patient currently in treatment for substance abuse?: No. Patient declined treatment information. (pt reported having 4-5 drinks per day. Patient declined treatment.)  Does patient have a history of Mental Health Diagnosis?: No    PHQ 2/9 Screening   Reviewed PHQ 2/9 Depression Screening Score?: No    Means of Transportation  Means of Transport to Appts:: Drives Self      Social Determinants of Health (SDOH)      Flowsheet Row Most Recent Value   Housing Stability    In the last 12 months, was there a time when you were not able to pay the mortgage or rent on time? N   In the past 12 months, how many times have you moved where you were living? 0   At any time in the past 12 months, were you homeless or living in a shelter (including now)? N   Transportation Needs    In the past 12 months, has lack of transportation kept you from medical appointments or from getting medications? no   In the past 12 months, has lack of transportation kept you from meetings, work, or from getting things needed for daily living? No   Food Insecurity    Within the past 12 months, you worried that your food would run out before you got the money to buy more. Never true   Within the past 12  months, the food you bought just didn't last and you didn't have money to get more. Never true   Utilities    In the past 12 months has the electric, gas, oil, or water company threatened to shut off services in your home? No            DISCHARGE DETAILS:    Discharge planning discussed with:: Patient at bedside  Freedom of Choice: Yes  Comments - Freedom of Choice: CM discussed freedom of choice as it pertains to discharge planning. Patient declined rehab and HHC. Patient declined any resources.  CM contacted family/caregiver?: No- see comments (A/O)  Were Treatment Team discharge recommendations reviewed with patient/caregiver?: Yes  Did patient/caregiver verbalize understanding of patient care needs?: Yes  Were patient/caregiver advised of the risks associated with not following Treatment Team discharge recommendations?: Yes         Requested Home Health Care         Is the patient interested in HHC at discharge?: No    DME Referral Provided  Referral made for DME?: No    Other Referral/Resources/Interventions Provided:  Interventions: None Indicated    Would you like to participate in our [facility] service program?  : No - Declined    Treatment Team Recommendation: Home  Discharge Destination Plan:: Home  Transport at Discharge : Family

## 2024-05-30 NOTE — ED PROVIDER NOTES
"History  Chief Complaint   Patient presents with    Leg Swelling     Leg swelling x couple of days, + 2 edema, + pulses. Phx of afib for which he \"never followed up with\" takes no  BT, denies hx CHF. Patient  says he knows he has afib but was never diagnosed/never followed up     Mentions that him pcp sent him to ER  after he was seen for a panic attack  earlier today     Patient is a 53-year-old male who presents to the emergency room for bilateral lower extremity edema.  Patient reports bilateral lower extremity swelling for the past few days, denies recent trauma or injury.  Associated abdominal swelling sensation.  Associated anxiety.  Denies any other symptoms.  No medication for symptom relief.  Of note, patient in rapid A-fib with RVR on arrival.  Heart rate 167 bpm with associated hypertension.  Patient reports no history of A-fib, but does report history of significantly elevated heart rates due to anxiety that self resolves after controlling anxiety. Denies any cardiac or respiratory symptoms.           Prior to Admission Medications   Prescriptions Last Dose Informant Patient Reported? Taking?   hydroxychloroquine (PLAQUENIL) 200 mg tablet   No No   Sig: Take 1 tablet (200 mg total) by mouth 2 (two) times a day with meals   Patient not taking: Reported on 5/29/2024   lisinopril (ZESTRIL) 10 mg tablet   No No   Sig: TAKE 1 TABLET(10 MG) BY MOUTH DAILY   meloxicam (MOBIC) 15 mg tablet   No No   Sig: TAKE 1 TABLET(15 MG) BY MOUTH DAILY      Facility-Administered Medications: None       No past medical history on file.    Past Surgical History:   Procedure Laterality Date    CARDIAC CATHETERIZATION N/A 2010    KNEE ARTHROSCOPY W/ MENISCAL REPAIR Bilateral     ROTATOR CUFF REPAIR Left        Family History   Problem Relation Age of Onset    Diabetes Mother     Heart disease Mother     Rheum arthritis Mother     Alcohol abuse Father     Stomach cancer Father     Breast cancer Maternal Grandmother      I " "have reviewed and agree with the history as documented.    E-Cigarette/Vaping    E-Cigarette Use Never User      E-Cigarette/Vaping Substances    Nicotine No     THC No     CBD No     Flavoring No     Other No     Unknown No      Social History     Tobacco Use    Smoking status: Never    Smokeless tobacco: Never   Vaping Use    Vaping status: Never Used   Substance Use Topics    Alcohol use: Yes     Comment: sobor x1 mo 5/2022    Drug use: No       Review of Systems   Constitutional:  Negative for chills and fever.   HENT:  Negative for ear pain, sore throat, trouble swallowing and voice change.    Eyes:  Negative for pain and visual disturbance.   Respiratory:  Negative for cough and shortness of breath.    Cardiovascular:  Positive for leg swelling (Bilateral). Negative for chest pain and palpitations.   Gastrointestinal:  Negative for abdominal pain, nausea and vomiting.        + abdominal \"swelling\" sensation    Genitourinary:  Negative for dysuria, flank pain and hematuria.   Musculoskeletal:  Negative for arthralgias and back pain.   Skin:  Negative for color change and rash.   Neurological:  Negative for dizziness, seizures, syncope and headaches.   Psychiatric/Behavioral:  Negative for confusion. The patient is nervous/anxious.    All other systems reviewed and are negative.      Physical Exam  Physical Exam  Vitals and nursing note reviewed.   Constitutional:       General: He is not in acute distress.     Appearance: He is well-developed.   HENT:      Head: Normocephalic and atraumatic.   Eyes:      Conjunctiva/sclera: Conjunctivae normal.   Cardiovascular:      Rate and Rhythm: Tachycardia present. Rhythm irregular.      Pulses: Normal pulses.      Heart sounds: No murmur heard.  Pulmonary:      Effort: Pulmonary effort is normal. No respiratory distress.      Breath sounds: Normal breath sounds.   Abdominal:      Palpations: Abdomen is soft.      Tenderness: There is no abdominal tenderness. "   Musculoskeletal:         General: No swelling.      Cervical back: Neck supple.      Right lower leg: Edema present.      Left lower leg: Edema present.   Skin:     General: Skin is warm and dry.      Capillary Refill: Capillary refill takes less than 2 seconds.   Neurological:      Mental Status: He is alert.   Psychiatric:         Mood and Affect: Mood normal.         Vital Signs  ED Triage Vitals [05/29/24 2003]   Temperature Pulse Respirations Blood Pressure SpO2   98.1 °F (36.7 °C) (!) 167 20 (!) 191/139 99 %      Temp Source Heart Rate Source Patient Position - Orthostatic VS BP Location FiO2 (%)   Oral Monitor Sitting Left arm --      Pain Score       --           Vitals:    05/30/24 0143 05/30/24 0245 05/30/24 0300 05/30/24 0713   BP: 166/95  124/83 (!) 138/103   Pulse: (!) 133 98 89 105   Patient Position - Orthostatic VS:    Sitting         Visual Acuity      ED Medications  Medications   diltiazem (CARDIZEM) 125 mg in sodium chloride 0.9 % 125 mL infusion (5 mg/hr Intravenous Rate/Dose Change 5/30/24 0045)   lisinopril (ZESTRIL) tablet 10 mg (has no administration in time range)   furosemide (LASIX) injection 40 mg (40 mg Intravenous Given 5/30/24 0131)   acetaminophen (TYLENOL) tablet 650 mg (has no administration in time range)   metoprolol tartrate (LOPRESSOR) tablet 25 mg (25 mg Oral Given 5/30/24 0131)   enoxaparin (LOVENOX) subcutaneous injection 135 mg (has no administration in time range)   hydrOXYzine HCL (ATARAX) tablet 25 mg (25 mg Oral Given 5/29/24 2032)   diltiazem (CARDIZEM) injection 25 mg (25 mg Intravenous Given 5/29/24 2100)   diltiazem (CARDIZEM) injection 25 mg (25 mg Intravenous Given 5/29/24 2203)   diltiazem (CARDIZEM) injection 20 mg (20 mg Intravenous Given 5/29/24 2336)   LORazepam (ATIVAN) tablet 0.5 mg (0.5 mg Oral Given 5/29/24 2310)   heparin (porcine) injection 4,000 Units (4,000 Units Intravenous Given 5/30/24 0131)       Diagnostic Studies  Results Reviewed        Procedure Component Value Units Date/Time    APTT six (6) hours after Heparin bolus/drip initiation or dosing change [[id number]] Collected: 05/30/24 0748    Lab Status: No result Specimen: Blood from Arm, Left     CBC and differential [[id number]] Collected: 05/30/24 0748    Lab Status: No result Specimen: Blood from Arm, Left     HS Troponin I 4hr [[id number]]  (Normal) Collected: 05/30/24 0044    Lab Status: Final result Specimen: Blood from Arm, Left Updated: 05/30/24 0114     hs TnI 4hr 23 ng/L      Delta 4hr hsTnI -5 ng/L     APTT [[id number]]  (Normal) Collected: 05/30/24 0044    Lab Status: Final result Specimen: Blood from Arm, Left Updated: 05/30/24 0112     PTT 35 seconds     Protime-INR [[id number]]  (Abnormal) Collected: 05/30/24 0044    Lab Status: Final result Specimen: Blood from Arm, Left Updated: 05/30/24 0112     Protime 16.1 seconds      INR 1.22    HS Troponin I 2hr [[id number]]  (Normal) Collected: 05/29/24 2246    Lab Status: Final result Specimen: Blood from Arm, Left Updated: 05/29/24 2314     hs TnI 2hr 28 ng/L      Delta 2hr hsTnI 0 ng/L     B-Type Natriuretic Peptide(BNP) [19682]  (Abnormal) Collected: 05/29/24 2022    Lab Status: Final result Specimen: Blood from Arm, Left Updated: 05/29/24 2111      pg/mL     HS Troponin 0hr (reflex protocol) [[id number]]  (Normal) Collected: 05/29/24 2022    Lab Status: Final result Specimen: Blood from Arm, Left Updated: 05/29/24 2053     hs TnI 0hr 28 ng/L     Comprehensive metabolic panel [[id number]]  (Abnormal) Collected: 05/29/24 2022    Lab Status: Final result Specimen: Blood from Arm, Left Updated: 05/29/24 2044     Sodium 140 mmol/L      Potassium 3.7 mmol/L      Chloride 105 mmol/L      CO2 25 mmol/L      ANION GAP 10 mmol/L      BUN 14 mg/dL      Creatinine 0.96 mg/dL      Glucose 114 mg/dL      Calcium 9.6 mg/dL      AST 29 U/L      ALT 35 U/L      Alkaline Phosphatase 93 U/L      Total Protein 7.4 g/dL      Albumin 4.4 g/dL      Total  Bilirubin 1.51 mg/dL      eGFR 88 ml/min/1.73sq m     Narrative:      National Kidney Disease Foundation guidelines for Chronic Kidney Disease (CKD):     Stage 1 with normal or high GFR (GFR > 90 mL/min/1.73 square meters)    Stage 2 Mild CKD (GFR = 60-89 mL/min/1.73 square meters)    Stage 3A Moderate CKD (GFR = 45-59 mL/min/1.73 square meters)    Stage 3B Moderate CKD (GFR = 30-44 mL/min/1.73 square meters)    Stage 4 Severe CKD (GFR = 15-29 mL/min/1.73 square meters)    Stage 5 End Stage CKD (GFR <15 mL/min/1.73 square meters)  Note: GFR calculation is accurate only with a steady state creatinine    CBC and differential [[id number]]  (Abnormal) Collected: 05/29/24 2022    Lab Status: Final result Specimen: Blood from Arm, Left Updated: 05/29/24 2028     WBC 10.00 Thousand/uL      RBC 5.32 Million/uL      Hemoglobin 14.6 g/dL      Hematocrit 46.6 %      MCV 88 fL      MCH 27.4 pg      MCHC 31.3 g/dL      RDW 14.4 %      MPV 11.2 fL      Platelets 320 Thousands/uL      nRBC 0 /100 WBCs      Segmented % 69 %      Immature Grans % 0 %      Lymphocytes % 17 %      Monocytes % 11 %      Eosinophils Relative 2 %      Basophils Relative 1 %      Absolute Neutrophils 7.02 Thousands/µL      Absolute Immature Grans 0.03 Thousand/uL      Absolute Lymphocytes 1.68 Thousands/µL      Absolute Monocytes 1.05 Thousand/µL      Eosinophils Absolute 0.15 Thousand/µL      Basophils Absolute 0.07 Thousands/µL                    XR chest 1 view portable   ED Interpretation by Nevin Gallardo PA-C (05/29 3972)   Findings consistent with pulmonary edema and likely CHF                 Procedures  ECG 12 Lead Documentation Only    Date/Time: 5/29/2024 9:11 PM    Performed by: Nevin Gallardo PA-C  Authorized by: Nevin Gallardo PA-C    Indications / Diagnosis:  Cardiac work-up  ECG reviewed by me, the ED Provider: yes    Patient location:  ED  Interpretation:     Interpretation: abnormal    Rate:     ECG rate:  136    ECG rate  assessment: tachycardic    Rhythm:     Rhythm: atrial fibrillation    ST segments:     ST segments:  Normal  T waves:     T waves: non-specific    Comments:      Atrial fibrillation with RVR           ED Course  ED Course as of 05/30/24 0756   Wed May 29, 2024   2056 hs TnI 0hr: 28 2110 HR improved to ~120-130 after administration of Cardizem, pt continues to deny cardiac or respiratory symptoms   2137 Pulse(!): 148  D/w internal medicine, will give another bolus of Cardizem for rate control prior to admission    2208 Second bolus of Cardizem given, heart rate improved to 110s to 120s bpm.   2225 Will order another bolus of cardizem. Per internal medicine, recommend starting titratable cardizem drip. Will continue to monitor prior to admission   2316 Delta 2hr hsTnI: 0             HEART Risk Score      Flowsheet Row Most Recent Value   Heart Score Risk Calculator    History 0 Filed at: 05/29/2024 2255   ECG 1 Filed at: 05/29/2024 2255   Age 1 Filed at: 05/29/2024 2255   Risk Factors 2 Filed at: 05/29/2024 2255   Troponin 1 Filed at: 05/29/2024 2255   HEART Score 5 Filed at: 05/29/2024 2255                          SBIRT 20yo+      Flowsheet Row Most Recent Value   Initial Alcohol Screen: US AUDIT-C     1. How often do you have a drink containing alcohol? 0 Filed at: 05/29/2024 2004   2. How many drinks containing alcohol do you have on a typical day you are drinking?  0 Filed at: 05/29/2024 2004   3a. Male UNDER 65: How often do you have five or more drinks on one occasion? 0 Filed at: 05/29/2024 2004   Audit-C Score 0 Filed at: 05/29/2024 2004   JOHN: How many times in the past year have you...    Used an illegal drug or used a prescription medication for non-medical reasons? Never Filed at: 05/29/2024 2004                      Medical Decision Making  53-year-old male presenting for bilateral LE edema. On arrival, BP hypertensive. Heart rate ~160-170. Patient denies any cardiac or respiratory complaints,  reporting anxiety. Associated abdominal fullness without pain. Patient reports hx episodes of increased heart rate at home that self resolves after controlling his anxiety. Denies history of A-fib. Patient given medication for anxiety and Cardizem for heart rate. Heart rate in the ED improved after 3 boluses of cardizem and cardizem drip. 0-hour troponin 28. . On exam, pt fluid overloaded without hx of CHF.  Discussed with internal medicine for admission.  Patient to be admitted to their service.  Discussed ED course and admission plan with patient.  Patient understands and consents to admission.    Amount and/or Complexity of Data Reviewed  Labs: ordered. Decision-making details documented in ED Course.  Radiology: ordered and independent interpretation performed.    Risk  Prescription drug management.  Decision regarding hospitalization.             Disposition  Final diagnoses:   Bilateral lower extremity edema   Atrial fibrillation (HCC)     Time reflects when diagnosis was documented in both MDM as applicable and the Disposition within this note       Time User Action Codes Description Comment    5/29/2024 10:12 PM Nevin Gallardo [R60.0] Bilateral lower extremity edema     5/29/2024 10:12 PM Nevin Gallardo [I48.91] Atrial fibrillation (HCC)     5/30/2024 12:39 AM Charlee Arreaga [I48.91] New onset atrial fibrillation with RVR (HCC)     5/30/2024 12:39 AM Charlee Arreaga [E87.70] Volume overload           ED Disposition       ED Disposition   Admit    Condition   Stable    Date/Time   Wed May 29, 2024 2642    Comment   Case was discussed with Charlee Arreaga PA-C and the patient's admission status was agreed to be Admission Status: inpatient status to the service of Dr. Fine               Follow-up Information    None         Patient's Medications   Discharge Prescriptions    No medications on file       No discharge procedures on file.    PDMP Review       None            ED  Provider  Electronically Signed by             Nevin Gallardo PA-C  05/30/24 0753

## 2024-05-30 NOTE — ASSESSMENT & PLAN NOTE
New onset afib with RVR -170s in the ER s/p 2x IV Cardizem 25mg bolus now slightly improved to 120-130ss  [id number] score 2   IV heparin gtt   Obtain echo   Start cardizem gtt   Start metoprolol tartrate 25mg q6h   Monitor telemetry   Cardiology consult appreciated

## 2024-05-31 ENCOUNTER — APPOINTMENT (OUTPATIENT)
Dept: NON INVASIVE DIAGNOSTICS | Facility: HOSPITAL | Age: 54
DRG: 192 | End: 2024-05-31
Payer: COMMERCIAL

## 2024-05-31 ENCOUNTER — APPOINTMENT (INPATIENT)
Dept: INTERVENTIONAL RADIOLOGY/VASCULAR | Facility: HOSPITAL | Age: 54
DRG: 192 | End: 2024-05-31
Payer: COMMERCIAL

## 2024-05-31 LAB
ANION GAP SERPL CALCULATED.3IONS-SCNC: 8 MMOL/L (ref 4–13)
AORTIC ROOT: 3.5 CM
ASCENDING AORTA: 3.5 CM
ATRIAL RATE: 67 BPM
BSA FOR ECHO PROCEDURE: 2.46 M2
BUN SERPL-MCNC: 18 MG/DL (ref 5–25)
CALCIUM SERPL-MCNC: 8.9 MG/DL (ref 8.4–10.2)
CHLORIDE SERPL-SCNC: 106 MMOL/L (ref 96–108)
CO2 SERPL-SCNC: 27 MMOL/L (ref 21–32)
CREAT SERPL-MCNC: 1.05 MG/DL (ref 0.6–1.3)
E WAVE DECELERATION TIME: 138 MS
E/A RATIO: 2.25
FRACTIONAL SHORTENING: 31 (ref 28–44)
GFR SERPL CREATININE-BSD FRML MDRD: 79 ML/MIN/1.73SQ M
GLUCOSE SERPL-MCNC: 86 MG/DL (ref 65–140)
INTERVENTRICULAR SEPTUM IN DIASTOLE (PARASTERNAL SHORT AXIS VIEW): 2.8 CM
INTERVENTRICULAR SEPTUM: 2.8 CM (ref 0.6–1.1)
LA/AORTA RATIO 2D: 1.37
LAAS-AP2: 30.3 CM2
LAAS-AP4: 27.4 CM2
LEFT ATRIUM SIZE: 4.8 CM
LEFT ATRIUM VOLUME (MOD BIPLANE): 98 ML
LEFT ATRIUM VOLUME INDEX (MOD BIPLANE): 39.8 ML/M2
LEFT INTERNAL DIMENSION IN SYSTOLE: 4.2 CM (ref 2.1–4)
LEFT VENTRICULAR INTERNAL DIMENSION IN DIASTOLE: 6.1 CM (ref 3.5–6)
LEFT VENTRICULAR POSTERIOR WALL IN END DIASTOLE: 1.3 CM
LEFT VENTRICULAR STROKE VOLUME: 108 ML
LVSV (TEICH): 108 ML
MAGNESIUM SERPL-MCNC: 1.7 MG/DL (ref 1.9–2.7)
MAGNESIUM SERPL-MCNC: 3 MG/DL (ref 1.9–2.7)
MV E'TISSUE VEL-LAT: 10 CM/S
MV E'TISSUE VEL-SEP: 9 CM/S
MV PEAK A VEL: 0.36 M/S
MV PEAK E VEL: 81 CM/S
MV STENOSIS PRESSURE HALF TIME: 40 MS
MV VALVE AREA P 1/2 METHOD: 5.5
P AXIS: 61 DEGREES
POTASSIUM SERPL-SCNC: 3.9 MMOL/L (ref 3.5–5.3)
PR INTERVAL: 160 MS
QRS AXIS: -10 DEGREES
QRS AXIS: -34 DEGREES
QRSD INTERVAL: 102 MS
QRSD INTERVAL: 96 MS
QT INTERVAL: 350 MS
QT INTERVAL: 422 MS
QTC INTERVAL: 445 MS
QTC INTERVAL: 453 MS
RIGHT VENTRICLE ID DIMENSION: 4.7 CM
SL CV LV EF: 40
SL CV LV EF: 40
SL CV PED ECHO LEFT VENTRICLE DIASTOLIC VOLUME (MOD BIPLANE) 2D: 189 ML
SL CV PED ECHO LEFT VENTRICLE SYSTOLIC VOLUME (MOD BIPLANE) 2D: 81 ML
SODIUM SERPL-SCNC: 141 MMOL/L (ref 135–147)
T WAVE AXIS: 123 DEGREES
T WAVE AXIS: 49 DEGREES
TR MAX PG: 26 MMHG
TR PEAK VELOCITY: 2.5 M/S
TRICUSPID ANNULAR PLANE SYSTOLIC EXCURSION: 2 CM
TRICUSPID VALVE PEAK REGURGITATION VELOCITY: 2.53 M/S
VENTRICULAR RATE: 101 BPM
VENTRICULAR RATE: 67 BPM

## 2024-05-31 PROCEDURE — 93010 ELECTROCARDIOGRAM REPORT: CPT | Performed by: INTERNAL MEDICINE

## 2024-05-31 PROCEDURE — 93325 DOPPLER ECHO COLOR FLOW MAPG: CPT | Performed by: INTERNAL MEDICINE

## 2024-05-31 PROCEDURE — 80048 BASIC METABOLIC PNL TOTAL CA: CPT | Performed by: PHYSICIAN ASSISTANT

## 2024-05-31 PROCEDURE — 92960 CARDIOVERSION ELECTRIC EXT: CPT

## 2024-05-31 PROCEDURE — 92960 CARDIOVERSION ELECTRIC EXT: CPT | Performed by: INTERNAL MEDICINE

## 2024-05-31 PROCEDURE — 93306 TTE W/DOPPLER COMPLETE: CPT

## 2024-05-31 PROCEDURE — 93312 ECHO TRANSESOPHAGEAL: CPT

## 2024-05-31 PROCEDURE — 93306 TTE W/DOPPLER COMPLETE: CPT | Performed by: INTERNAL MEDICINE

## 2024-05-31 PROCEDURE — 83735 ASSAY OF MAGNESIUM: CPT | Performed by: INTERNAL MEDICINE

## 2024-05-31 PROCEDURE — 99232 SBSQ HOSP IP/OBS MODERATE 35: CPT | Performed by: STUDENT IN AN ORGANIZED HEALTH CARE EDUCATION/TRAINING PROGRAM

## 2024-05-31 PROCEDURE — 93005 ELECTROCARDIOGRAM TRACING: CPT

## 2024-05-31 PROCEDURE — 93320 DOPPLER ECHO COMPLETE: CPT | Performed by: INTERNAL MEDICINE

## 2024-05-31 PROCEDURE — 93312 ECHO TRANSESOPHAGEAL: CPT | Performed by: INTERNAL MEDICINE

## 2024-05-31 PROCEDURE — 83735 ASSAY OF MAGNESIUM: CPT | Performed by: PHYSICIAN ASSISTANT

## 2024-05-31 PROCEDURE — 99232 SBSQ HOSP IP/OBS MODERATE 35: CPT | Performed by: INTERNAL MEDICINE

## 2024-05-31 RX ORDER — MAGNESIUM SULFATE HEPTAHYDRATE 40 MG/ML
2 INJECTION, SOLUTION INTRAVENOUS ONCE
Status: COMPLETED | OUTPATIENT
Start: 2024-05-31 | End: 2024-05-31

## 2024-05-31 RX ORDER — PROPOFOL 10 MG/ML
INJECTION, EMULSION INTRAVENOUS AS NEEDED
Status: DISCONTINUED | OUTPATIENT
Start: 2024-05-31 | End: 2024-05-31

## 2024-05-31 RX ORDER — METOPROLOL SUCCINATE 100 MG/1
100 TABLET, EXTENDED RELEASE ORAL DAILY
Status: DISCONTINUED | OUTPATIENT
Start: 2024-05-31 | End: 2024-06-04 | Stop reason: HOSPADM

## 2024-05-31 RX ORDER — SODIUM CHLORIDE 9 MG/ML
INJECTION, SOLUTION INTRAVENOUS CONTINUOUS PRN
Status: DISCONTINUED | OUTPATIENT
Start: 2024-05-31 | End: 2024-05-31

## 2024-05-31 RX ORDER — MAGNESIUM SULFATE HEPTAHYDRATE 40 MG/ML
2 INJECTION, SOLUTION INTRAVENOUS ONCE
Status: DISCONTINUED | OUTPATIENT
Start: 2024-05-31 | End: 2024-05-31

## 2024-05-31 RX ORDER — LIDOCAINE HYDROCHLORIDE 20 MG/ML
INJECTION, SOLUTION EPIDURAL; INFILTRATION; INTRACAUDAL; PERINEURAL AS NEEDED
Status: DISCONTINUED | OUTPATIENT
Start: 2024-05-31 | End: 2024-05-31

## 2024-05-31 RX ADMIN — LISINOPRIL 10 MG: 10 TABLET ORAL at 14:58

## 2024-05-31 RX ADMIN — PROPOFOL 50 MG: 10 INJECTION, EMULSION INTRAVENOUS at 13:36

## 2024-05-31 RX ADMIN — MAGNESIUM SULFATE HEPTAHYDRATE 2 G: 40 INJECTION, SOLUTION INTRAVENOUS at 10:36

## 2024-05-31 RX ADMIN — FUROSEMIDE 40 MG: 10 INJECTION, SOLUTION INTRAMUSCULAR; INTRAVENOUS at 14:59

## 2024-05-31 RX ADMIN — APIXABAN 5 MG: 5 TABLET, FILM COATED ORAL at 14:59

## 2024-05-31 RX ADMIN — METOPROLOL TARTRATE 25 MG: 25 TABLET, FILM COATED ORAL at 01:44

## 2024-05-31 RX ADMIN — PROPOFOL 50 MG: 10 INJECTION, EMULSION INTRAVENOUS at 13:31

## 2024-05-31 RX ADMIN — PROPOFOL 150 MG: 10 INJECTION, EMULSION INTRAVENOUS at 13:29

## 2024-05-31 RX ADMIN — METOPROLOL SUCCINATE 100 MG: 100 TABLET, EXTENDED RELEASE ORAL at 14:58

## 2024-05-31 RX ADMIN — PROPOFOL 50 MG: 10 INJECTION, EMULSION INTRAVENOUS at 13:32

## 2024-05-31 RX ADMIN — LIDOCAINE HYDROCHLORIDE 100 MG: 20 INJECTION, SOLUTION EPIDURAL; INFILTRATION; INTRACAUDAL; PERINEURAL at 13:29

## 2024-05-31 RX ADMIN — FUROSEMIDE 40 MG: 10 INJECTION, SOLUTION INTRAMUSCULAR; INTRAVENOUS at 21:12

## 2024-05-31 RX ADMIN — APIXABAN 5 MG: 5 TABLET, FILM COATED ORAL at 21:12

## 2024-05-31 RX ADMIN — SODIUM CHLORIDE: 0.9 INJECTION, SOLUTION INTRAVENOUS at 12:45

## 2024-05-31 RX ADMIN — PROPOFOL 50 MG: 10 INJECTION, EMULSION INTRAVENOUS at 13:41

## 2024-05-31 NOTE — UTILIZATION REVIEW
NOTIFICATION OF INPATIENT ADMISSION   AUTHORIZATION REQUEST   SERVICING FACILITY:   [place] [address]  Tax ID: [id number]  NPI: [id number] ATTENDING PROVIDER:  Attending Name and NPI#: Oliver Apple Md [[id number]]  Address: [address] [place] [address]  Phone: [phone]     ADMISSION INFORMATION:  Place of Service: Inpatient Acute [facility]  Place of Service Code: 21  Inpatient Admission Date/Time: 5/29/24 10:21 PM  Discharge Date/Time: No discharge date for patient encounter.  Admitting Diagnosis Code/Description:  Atrial fibrillation (HCC) [I48.91]  Leg swelling [M79.89]  New onset atrial fibrillation (HCC) [I48.91]  Volume overload [E87.70]  Bilateral lower extremity edema [R60.0]     UTILIZATION REVIEW CONTACT:  Kristyn Gaytan, Utilization   Network Utilization Review Department  Phone: [phone]  Fax [phone]  Email: [email or web address]  Contact for approvals/pending authorizations, clinical reviews, and discharge.     PHYSICIAN ADVISORY SERVICES:  Medical Necessity Denial & Cfow-lk-Blwe Review  Phone: [phone]  Fax: [phone]  Email: [email or web address]     DISCHARGE SUPPORT TEAM:  For Patients Discharge Needs & Updates  Phone: [phone] opt. 2 Fax: [phone]  Email: [email or web address]

## 2024-05-31 NOTE — PLAN OF CARE
Problem: PAIN - ADULT  Goal: Verbalizes/displays adequate comfort level or baseline comfort level  Description: Interventions:  - Encourage patient to monitor pain and request assistance  - Assess pain using appropriate pain scale  - Administer analgesics based on type and severity of pain and evaluate response  - Implement non-pharmacological measures as appropriate and evaluate response  - Consider cultural and social influences on pain and pain management  - Notify physician/advanced practitioner if interventions unsuccessful or patient reports new pain  Outcome: Progressing     Problem: INFECTION - ADULT  Goal: Absence or prevention of progression during hospitalization  Description: INTERVENTIONS:  - Assess and monitor for signs and symptoms of infection  - Monitor lab/diagnostic results  - Monitor all insertion sites, i.e. indwelling lines, tubes, and drains  - Monitor endotracheal if appropriate and nasal secretions for changes in amount and color  - Pierron appropriate cooling/warming therapies per order  - Administer medications as ordered  - Instruct and encourage patient and family to use good hand hygiene technique  - Identify and instruct in appropriate isolation precautions for identified infection/condition  Outcome: Progressing     Problem: SAFETY ADULT  Goal: Patient will remain free of falls  Description: INTERVENTIONS:  - Educate patient/family on patient safety including physical limitations  - Instruct patient to call for assistance with activity   - Consult OT/PT to assist with strengthening/mobility   - Keep Call bell within reach  - Keep bed low and locked with side rails adjusted as appropriate  - Keep care items and personal belongings within reach  - Initiate and maintain comfort rounds  - Make Fall Risk Sign visible to staff  - Offer Toileting every 2 Hours, in advance of need  - Initiate/Maintain bed alarm  - Obtain necessary fall risk management equipment:   - Apply yellow socks and  bracelet for high fall risk patients  - Consider moving patient to room near nurses station  Outcome: Progressing  Goal: Maintain or return to baseline ADL function  Description: INTERVENTIONS:  -  Assess patient's ability to carry out ADLs; assess patient's baseline for ADL function and identify physical deficits which impact ability to perform ADLs (bathing, care of mouth/teeth, toileting, grooming, dressing, etc.)  - Assess/evaluate cause of self-care deficits   - Assess range of motion  - Assess patient's mobility; develop plan if impaired  - Assess patient's need for assistive devices and provide as appropriate  - Encourage maximum independence but intervene and supervise when necessary  - Involve family in performance of ADLs  - Assess for home care needs following discharge   - Consider OT consult to assist with ADL evaluation and planning for discharge  - Provide patient education as appropriate  Outcome: Progressing  Goal: Maintains/Returns to pre admission functional level  Description: INTERVENTIONS:  - Perform AM-PAC 6 Click Basic Mobility/ Daily Activity assessment daily.  - Set and communicate daily mobility goal to care team and patient/family/caregiver.   - Collaborate with rehabilitation services on mobility goals if consulted  - Perform Range of Motion 3 times a day.  - Reposition patient every 2 hours.  - Dangle patient 3 times a day  - Stand patient 3 times a day  - Ambulate patient 3 times a day  - Out of bed to chair 3 times a day   - Out of bed for meals 3 times a day  - Out of bed for toileting  - Record patient progress and toleration of activity level   Outcome: Progressing     Problem: DISCHARGE PLANNING  Goal: Discharge to home or other facility with appropriate resources  Description: INTERVENTIONS:  - Identify barriers to discharge w/patient and caregiver  - Arrange for needed discharge resources and transportation as appropriate  - Identify discharge learning needs (meds, wound care,  etc.)  - Arrange for interpretive services to assist at discharge as needed  - Refer to Case Management Department for coordinating discharge planning if the patient needs post-hospital services based on physician/advanced practitioner order or complex needs related to functional status, cognitive ability, or social support system  Outcome: Progressing     Problem: Knowledge Deficit  Goal: Patient/family/caregiver demonstrates understanding of disease process, treatment plan, medications, and discharge instructions  Description: Complete learning assessment and assess knowledge base.  Interventions:  - Provide teaching at level of understanding  - Provide teaching via preferred learning methods  Outcome: Progressing

## 2024-05-31 NOTE — ASSESSMENT & PLAN NOTE
New onset afib with RVR   Improving today   Plan for SPENCER and cardioversion  Eliquis price checked- no copay  Cardiology following, see recommendations

## 2024-05-31 NOTE — UTILIZATION REVIEW
NOTIFICATION OF INPATIENT ADMISSION   AUTHORIZATION REQUEST   SERVICING FACILITY:   [place] [address]  Tax ID: [id number]  NPI: [id number] ATTENDING PROVIDER:  Attending Name and NPI#: Oliver Apple Md [[id number]]  Address: [address] [place] [address]  Phone: [phone]     PATIENT IS AT [facility]   ADMISSION INFORMATION:  Place of Service: Inpatient Acute [facility]  Place of Service Code: 21  Inpatient Admission Date/Time: 5/29/24 10:21 PM  Discharge Date/Time: No discharge date for patient encounter.  Admitting Diagnosis Code/Description:  Atrial fibrillation (HCC) [I48.91]  Leg swelling [M79.89]  New onset atrial fibrillation (HCC) [I48.91]  Volume overload [E87.70]  Bilateral lower extremity edema [R60.0]     UTILIZATION REVIEW CONTACT:  Kristyn Gaytan, Utilization   Network Utilization Review Department  Phone: [phone]  Fax [phone]  Email: [email or web address]  Contact for approvals/pending authorizations, clinical reviews, and discharge.     PHYSICIAN ADVISORY SERVICES:  Medical Necessity Denial & Kipc-dq-Kkgg Review  Phone: [phone]  Fax: [phone]  Email: [email or web address]     DISCHARGE SUPPORT TEAM:  For Patients Discharge Needs & Updates  Phone: [phone] opt. 2 Fax: [phone]  Email: [email or web address]

## 2024-05-31 NOTE — CASE MANAGEMENT
Case Management Progress Note    Patient name Antonio Mckeon  Location 2 EAST 283/2 E 283-01 MRN [id number]  : 1970 Date 2024       LOS (days): 2  Geometric Mean LOS (GMLOS) (days):   Days to GMLOS:        OBJECTIVE:        Current admission status: Inpatient  Preferred Pharmacy:   [id number] [address]  [address] [address]  Phone: [phone] Fax: [phone]    [id number] [place] - [address] [place]  [address] [facility] [address]  Phone: [phone] Fax: [phone]    Primary Care Provider: Kayla Fall PA-C    Primary Insurance: [employer]  Secondary Insurance:     PROGRESS NOTE:    Per rounding with SLIM, patient is being treated for new Afib and will need an anticoagulant at discharge. SLIM reported that he will price check this medication. Patient anticipated to be discharged home today vs. 24 hrs. No CM needs anticipated at this time. CM department will continue to follow patient through discharge.

## 2024-05-31 NOTE — PLAN OF CARE
Problem: PAIN - ADULT  Goal: Verbalizes/displays adequate comfort level or baseline comfort level  Description: Interventions:  - Encourage patient to monitor pain and request assistance  - Assess pain using appropriate pain scale  - Administer analgesics based on type and severity of pain and evaluate response  - Implement non-pharmacological measures as appropriate and evaluate response  - Consider cultural and social influences on pain and pain management  - Notify physician/advanced practitioner if interventions unsuccessful or patient reports new pain  Outcome: Progressing     Problem: INFECTION - ADULT  Goal: Absence or prevention of progression during hospitalization  Description: INTERVENTIONS:  - Assess and monitor for signs and symptoms of infection  - Monitor lab/diagnostic results  - Monitor all insertion sites, i.e. indwelling lines, tubes, and drains  - Monitor endotracheal if appropriate and nasal secretions for changes in amount and color  - [place] appropriate cooling/warming therapies per order  - Administer medications as ordered  - Instruct and encourage patient and family to use good hand hygiene technique  - Identify and instruct in appropriate isolation precautions for identified infection/condition  Outcome: Progressing     Problem: SAFETY ADULT  Goal: Patient will remain free of falls  Description: INTERVENTIONS:  - Educate patient/family on patient safety including physical limitations  - Instruct patient to call for assistance with activity   - Consult OT/PT to assist with strengthening/mobility   - Keep Call bell within reach  - Keep bed low and locked with side rails adjusted as appropriate  - Keep care items and personal belongings within reach  - Initiate and maintain comfort rounds  - Make Fall Risk Sign visible to staff  - Offer Toileting every  Hours, in advance of need  - Initiate/Maintain alarm  - Obtain necessary fall risk management equipment:   - Apply yellow socks and  bracelet for high fall risk patients  - Consider moving patient to room near nurses station  Outcome: Progressing  Goal: Maintain or return to baseline ADL function  Description: INTERVENTIONS:  -  Assess patient's ability to carry out ADLs; assess patient's baseline for ADL function and identify physical deficits which impact ability to perform ADLs (bathing, care of mouth/teeth, toileting, grooming, dressing, etc.)  - Assess/evaluate cause of self-care deficits   - Assess range of motion  - Assess patient's mobility; develop plan if impaired  - Assess patient's need for assistive devices and provide as appropriate  - Encourage maximum independence but intervene and supervise when necessary  - Involve family in performance of ADLs  - Assess for home care needs following discharge   - Consider OT consult to assist with ADL evaluation and planning for discharge  - Provide patient education as appropriate  Outcome: Progressing  Goal: Maintains/Returns to pre admission functional level  Description: INTERVENTIONS:  - Perform AM-PAC 6 Click Basic Mobility/ Daily Activity assessment daily.  - Set and communicate daily mobility goal to care team and patient/family/caregiver.   - Collaborate with rehabilitation services on mobility goals if consulted  - Perform Range of Motion  times a day.  - Reposition patient every  hours.  - Dangle patient  times a day  - Stand patient  times a day  - Ambulate patient  times a day  - Out of bed to chair  times a day   - Out of bed for meals  times a day  - Out of bed for toileting  - Record patient progress and toleration of activity level   Outcome: Progressing     Problem: DISCHARGE PLANNING  Goal: Discharge to home or other facility with appropriate resources  Description: INTERVENTIONS:  - Identify barriers to discharge w/patient and caregiver  - Arrange for needed discharge resources and transportation as appropriate  - Identify discharge learning needs (meds, wound care, etc.)  -  Arrange for interpretive services to assist at discharge as needed  - Refer to Case Management Department for coordinating discharge planning if the patient needs post-hospital services based on physician/advanced practitioner order or complex needs related to functional status, cognitive ability, or social support system  Outcome: Progressing     Problem: Knowledge Deficit  Goal: Patient/family/caregiver demonstrates understanding of disease process, treatment plan, medications, and discharge instructions  Description: Complete learning assessment and assess knowledge base.  Interventions:  - Provide teaching at level of understanding  - Provide teaching via preferred learning methods  Outcome: Progressing     Problem: Nutrition/Hydration-ADULT  Goal: Nutrient/Hydration intake appropriate for improving, restoring or maintaining nutritional needs  Description: Monitor and assess patient's nutrition/hydration status for malnutrition. Collaborate with interdisciplinary team and initiate plan and interventions as ordered.  Monitor patient's weight and dietary intake as ordered or per policy. Utilize nutrition screening tool and intervene as necessary. Determine patient's food preferences and provide high-protein, high-caloric foods as appropriate.     INTERVENTIONS:  - Monitor oral intake, urinary output, labs, and treatment plans  - Assess nutrition and hydration status and recommend course of action  - Evaluate amount of meals eaten  - Assist patient with eating if necessary   - Allow adequate time for meals  - Recommend/ encourage appropriate diets, oral nutritional supplements, and vitamin/mineral supplements  - Order, calculate, and assess calorie counts as needed  - Recommend, monitor, and adjust tube feedings and TPN/PPN based on assessed needs  - Assess need for intravenous fluids  - Provide specific nutrition/hydration education as appropriate  - Include patient/family/caregiver in decisions related to  nutrition  Outcome: Progressing

## 2024-05-31 NOTE — PROGRESS NOTES
General Cardiology   Progress Note   Antonio Mckeon 53 y.o. male MRN: [id number]  Unit/Bed#: 2 E 283-01 Encounter: [id number]    Assessment/Plan:    New onset atrial fibrillation with RVR  - Ranging in the 80s to 100s  - discontinue Cardizem drip  - S/p SPENCER and DCCV with conversion to sinus rhythm  - discontinue metoprolol tartrate and start Toprol 100 mg daily   - Continue Eliquis for anticoagulation ([id number] score of 2)  -continue to monitor heart rate trends on telemetry  -TTE performed today revealed new low EF of 40% with moderate global hypokinesis and grade 2 diastolic dysfunction    2.  New onset acute HFrEF/new cardiomyopathy with an EF of 40%  -Continue IV Lasix 40 mg twice daily  - TTE revealed new low EF of 40%  - Continue metoprolol succinate and lisinopril  - Strict I's and O's and daily weights  - Recommend low-sodium diet  -Plan for eventual ischemic evaluation, patient remains admitted over the weekend, plan for Monday otherwise can be done as an outpatient    3.  Hypertensive urgency  - Blood pressures have overall improved  - Continue lisinopril and metoprolol tartrate   -Continue to monitor blood pressures closely    4.  Rheumatoid arthritis        Subjective:   Patient seen and examined. No significant events since the last encounter.     REVIEW OF SYSTEMS:  Constitutional:  Denies fever or chills   Eyes:  Denies change in visual acuity   HENT:  Denies nasal congestion or sore throat   Respiratory:  Denies cough, orthopnea, PND or shortness of breath   Cardiovascular:  Denies chest pain, palpitations or edema   GI:  Denies abdominal pain, nausea, vomiting, bloody stools, constipation or diarrhea   :  Denies dysuria, frequency, difficulty in urination or nocturia  Musculoskeletal:  Denies back pain or joint pain   Neurologic:  Denies headache, focal weakness or sensory changes   Endocrine:  Denies polyuria or polydipsia   Lymphatic:  Denies swollen glands   Psychiatric:  Denies depression  or anxiety     Objective:   Vitals:  Vitals:    24   BP: (!) 170/121   Pulse: 97   Resp: 16   Temp: 97.9 °F (36.6 °C)   SpO2: 95%       Body mass index is 39.89 kg/m².  Systolic (24hrs), Av , Min:114 , Max:170     Diastolic (24hrs), Av, Min:77, Max:121      Intake/Output Summary (Last 24 hours) at 2024 09  Last data filed at 2024  Gross per 24 hour   Intake --   Output 950 ml   Net -950 ml     Weight (last 2 days)       Date/Time Weight    24 0810 130 (286)    24 0543 130 (286.6)    24 13:12:20 130 (287.4)            Telemetry Review: No significant arrhythmias seen on telemetry review.         PHYSICAL EXAMS  General:  Patient is not in acute distress, laying in the bed comfortably, awake  Head: Normocephalic, Atraumatic.   HEENT: White sclera, pink conjunctiva  Neck:  Supple, no neck vein distention  Respiratory: clear to auscultation   Cardiovascular: S1-S2 normal, no murmurs, thrills, gallops, rubs, regular rhythm  GI:  Abdomen soft, nontender, non-distended  Extremities: No edema, normal pulses  Integument:  No skin rashes or ulceration  Neurologic:  Patient is awake alert, responding to command, oriented to person, place and time    LABORATORY RESULTS:      CBC with diff:   Results from last 7 days   Lab Units 24  0748 24   WBC Thousand/uL 8.98 10.00   HEMOGLOBIN g/dL 13.1 14.6   HEMATOCRIT % 40.1 46.6   MCV fL 86 88   PLATELETS Thousands/uL 289 320   RBC Million/uL 4.68 5.32   MCH pg 28.0 27.4   MCHC g/dL 32.7 31.3*   RDW % 14.4 14.4   MPV fL 10.7 11.2   NRBC AUTO /100 WBCs 0 0       CMP:  Results from last 7 days   Lab Units 24  0541 24   POTASSIUM mmol/L 3.9 3.7   CHLORIDE mmol/L 106 105   CO2 mmol/L 27 25   BUN mg/dL 18 14   CREATININE mg/dL 1.05 0.96   CALCIUM mg/dL 8.9 9.6   AST U/L  --  29   ALT U/L  --  35   ALK PHOS U/L  --  93   EGFR ml/min/1.73sq m 79 88       BMP:  Results from last 7 days   Lab Units  "05/31/24  0541 05/29/24 2022   POTASSIUM mmol/L 3.9 3.7   CHLORIDE mmol/L 106 105   CO2 mmol/L 27 25   BUN mg/dL 18 14   CREATININE mg/dL 1.05 0.96   CALCIUM mg/dL 8.9 9.6              Results from last 7 days   Lab Units 05/31/24  0541   MAGNESIUM mg/dL 1.7*         Results from last 7 days   Lab Units 05/29/24 2022   TSH 3RD GENERATON uIU/mL 2.844     Results from last 7 days   Lab Units 05/30/24  0044   INR  1.22*       Lipid Profile:   No results found for: \"CHOL\"  No results found for: \"HDL\"  No results found for: \"LDLCALC\"  No results found for: \"TRIG\"    Cardiac testing:   No results found for this or any previous visit.    No results found for this or any previous visit.    No results found for this or any previous visit.    No valid procedures specified.  No results found for this or any previous visit.      Meds/Allergies   all current active meds have been reviewed    Medications Prior to Admission:     hydroxychloroquine (PLAQUENIL) 200 mg tablet    lisinopril (ZESTRIL) 10 mg tablet    meloxicam (MOBIC) 15 mg tablet    diltiazem, 5 mg/hr, Last Rate: 5 mg/hr (05/30/24 2028)          ** Please Note: Dragon 360 Dictation voice to text software may have been used in the creation of this document. **    "

## 2024-05-31 NOTE — PROGRESS NOTES
[facility]  Progress Note  Name: Antonio Mckeon I  MRN: [id number]  Unit/Bed#: 2 E 283-01 I Date of Admission: 5/29/2024   Date of Service: 5/31/2024 I Hospital Day: 2    Assessment & Plan   * Volume overload  Assessment & Plan  Secondary to new onset afib resulting in sub optimal cardiac output  BNP never collected   Started on IV lasix  Cardiology following  ECHO pending, follow up results    New onset atrial fibrillation with RVR (HCC)  Assessment & Plan  New onset afib with RVR   Improving today   Plan for SPENCER and cardioversion  Eliquis price checked- no copay  Cardiology following, see recommendations    Primary hypertension  Assessment & Plan  Better controlled  Monitor                VTE Pharmacologic Prophylaxis: VTE Score: 3 Moderate Risk (Score 3-4) - Pharmacological DVT Prophylaxis Ordered: apixaban (Eliquis).    Mobility:   Basic Mobility Inpatient Raw Score: 24  JH-HLM Goal: 8: Walk 250 feet or more  JH-HLM Achieved: 7: Walk 25 feet or more  JH-HLM Goal achieved. Continue to encourage appropriate mobility.    Patient Centered Rounds: I performed bedside rounds with nursing staff today.   Discussions with Specialists or Other Care Team Provider: cardiology     Education and Discussions with Family / Patient: Patient declined call to .     Total Time Spent on Date of Encounter in care of patient: 30+ mins. This time was spent on one or more of the following: performing physical exam; counseling and coordination of care; obtaining or reviewing history; documenting in the medical record; reviewing/ordering tests, medications or procedures; communicating with other healthcare professionals and discussing with patient's family/caregivers.    Current Length of Stay: 2 day(s)  Current Patient Status: Inpatient   Certification Statement: The patient will continue to require additional inpatient hospital stay due to SPENCER and cardioversion   Discharge Plan: Anticipate discharge  in 24-48 hrs to home.    Code Status: Level 1 - Full Code    Subjective:   Patient feels better     Objective:     Vitals:   Temp (24hrs), Av.8 °F (36.6 °C), Min:97.5 °F (36.4 °C), Max:98 °F (36.7 °C)    Temp:  [97.5 °F (36.4 °C)-98 °F (36.7 °C)] 97.9 °F (36.6 °C)  HR:  [] 97  Resp:  [16-20] 16  BP: (114-170)/() 170/121  SpO2:  [90 %-95 %] 95 %  Body mass index is 39.89 kg/m².     Input and Output Summary (last 24 hours):     Intake/Output Summary (Last 24 hours) at 2024 1153  Last data filed at 2024  Gross per 24 hour   Intake --   Output 500 ml   Net -500 ml       Physical Exam:   Physical Exam  Constitutional:       General: He is not in acute distress.     Appearance: Normal appearance. He is not toxic-appearing.   Cardiovascular:      Rate and Rhythm: Tachycardia present. Rhythm irregular.      Heart sounds: Normal heart sounds. No murmur heard.  Pulmonary:      Effort: Pulmonary effort is normal. No respiratory distress.      Breath sounds: Normal breath sounds. No wheezing.   Abdominal:      General: Abdomen is flat. There is no distension.      Palpations: Abdomen is soft.      Tenderness: There is no abdominal tenderness.   Neurological:      General: No focal deficit present.      Mental Status: He is alert and oriented to person, place, and time. Mental status is at baseline.      Motor: No weakness.          Additional Data:     Labs:  Results from last 7 days   Lab Units 24  0748   WBC Thousand/uL 8.98   HEMOGLOBIN g/dL 13.1   HEMATOCRIT % 40.1   PLATELETS Thousands/uL 289   SEGS PCT % 73   LYMPHO PCT % 15   MONO PCT % 10   EOS PCT % 1     Results from last 7 days   Lab Units 24  0541 24   SODIUM mmol/L 141 140   POTASSIUM mmol/L 3.9 3.7   CHLORIDE mmol/L 106 105   CO2 mmol/L 27 25   BUN mg/dL 18 14   CREATININE mg/dL 1.05 0.96   ANION GAP mmol/L 8 10   CALCIUM mg/dL 8.9 9.6   ALBUMIN g/dL  --  4.4   TOTAL BILIRUBIN mg/dL  --  1.51*   ALK PHOS U/L   --  93   ALT U/L  --  35   AST U/L  --  29   GLUCOSE RANDOM mg/dL 86 114     Results from last 7 days   Lab Units 05/30/24  0044   INR  1.22*                   Lines/Drains:  Invasive Devices       Peripheral Intravenous Line  Duration             Peripheral IV 05/29/24 Left Antecubital 1 day    Peripheral IV 05/30/24 Right;Ventral (anterior) Hand 1 day                      Telemetry:  Telemetry Orders (From admission, onward)               24 Hour Telemetry Monitoring  Continuous x 24 Hours (Telem)        Question:  Reason for 24 Hour Telemetry  Answer:  Arrhythmias requiring acute medical intervention / PPM or ICD malfunction                            Imaging: No pertinent imaging reviewed.    Recent Cultures (last 7 days):         Last 24 Hours Medication List:   Current Facility-Administered Medications   Medication Dose Route Frequency Provider Last Rate    acetaminophen  650 mg Oral Q4H PRN Charlee Arreaga PA-C      apixaban  5 mg Oral BID Oliver Apple MD      diltiazem  5 mg/hr Intravenous Continuous Oliver Apple MD 5 mg/hr (05/30/24 2028)    furosemide  40 mg Intravenous BID Charlee Arreaga PA-C      lisinopril  10 mg Oral Daily Charlee Arreaga PA-C      magnesium sulfate  2 g Intravenous Once LORELEI Smith 2 g (05/31/24 1036)    metoprolol tartrate  25 mg Oral Q6H Charlee Arreaga PA-C          Today, Patient Was Seen By: Oliver Coles MD    **Please Note: This note may have been constructed using a voice recognition system.**

## 2024-05-31 NOTE — UTILIZATION REVIEW
Notification of Unplanned, Urgent, or   Emergency Inpatient Admission   AUTHORIZATION REQUEST   Admitting Facility [place] [address]  Tax ID: [id number]  NPI: [id number]  Place of Service: Acute [facility]  Admission Level of Care: Inpatient  Place of Service Code: 21     Attending Physician Information  Attending Name and NPI#: Oliver Apple Md [[id number]]  Phone: [phone]     Admission Information  Inpatient Admission Date/Time: 5/29/24 10:21 PM  Discharge Date/Time: No discharge date for patient encounter.  Admitting Diagnosis Code/Description:  Atrial fibrillation (HCC) [I48.91]  Leg swelling [M79.89]  New onset atrial fibrillation (HCC) [I48.91]  Volume overload [E87.70]  Bilateral lower extremity edema [R60.0]     Utilization Review Contact  Mali Lopez, Utilization   Phone: [phone]  Fax: [phone]  Email: [email or web address]  Contact for approvals/pending authorizations, clinical reviews, and discharge.     Physician Advisory Services Contact  Medical Necessity Denial & [employer] Discussion  Phone: [phone]  Fax: [phone]  Email: [email or web address]     DISCHARGE SUPPORT TEAM:  For Patients Discharge Needs & Updates  Phone: [phone] opt. 2 Fax: [phone]  Email: [email or web address]

## 2024-05-31 NOTE — ANESTHESIA PREPROCEDURE EVALUATION
Procedure:  SPENCER    Relevant Problems   No relevant active problems        Physical Exam    Airway    Mallampati score: II  TM Distance: >3 FB  Neck ROM: full     Dental   No notable dental hx     Cardiovascular  Rhythm: irregular    Pulmonary  Pulmonary exam normal Breath sounds clear to auscultation    Other Findings  On beta blocker and cardizem gtt for afib with rvr- new onset     Anesthesia Plan  ASA Score- 3     Anesthesia Type- IV sedation with anesthesia with ASA Monitors.         Additional Monitors:     Airway Plan:            Plan Factors-Exercise tolerance (METS): >4 METS.    Chart reviewed. EKG reviewed. Imaging results reviewed. Existing labs reviewed. Patient summary reviewed.    Patient is not a current smoker.  Patient did not smoke on day of surgery.            Induction- intravenous.    Postoperative Plan-     Perioperative Resuscitation Plan - Level 1 - Full Code.       Informed Consent- Anesthetic plan and risks discussed with patient.  I personally reviewed this patient with the CRNA. Discussed and agreed on the Anesthesia Plan with the CRNA..

## 2024-06-01 LAB
APTT PPP: 35 SECONDS (ref 23–37)
APTT PPP: 85 SECONDS (ref 23–37)
ATRIAL RATE: 92 BPM
ERYTHROCYTE [DISTWIDTH] IN BLOOD BY AUTOMATED COUNT: 14.1 % (ref 11.6–15.1)
HCT VFR BLD AUTO: 40.8 % (ref 36.5–49.3)
HGB BLD-MCNC: 13.3 G/DL (ref 12–17)
INR PPP: 1.37 (ref 0.84–1.19)
MCH RBC QN AUTO: 28.2 PG (ref 26.8–34.3)
MCHC RBC AUTO-ENTMCNC: 32.6 G/DL (ref 31.4–37.4)
MCV RBC AUTO: 86 FL (ref 82–98)
PLATELET # BLD AUTO: 304 THOUSANDS/UL (ref 149–390)
PMV BLD AUTO: 10.5 FL (ref 8.9–12.7)
PR INTERVAL: 158 MS
PROTHROMBIN TIME: 17.6 SECONDS (ref 11.6–14.5)
QRS AXIS: -40 DEGREES
QRSD INTERVAL: 92 MS
QT INTERVAL: 378 MS
QTC INTERVAL: 459 MS
RBC # BLD AUTO: 4.72 MILLION/UL (ref 3.88–5.62)
T WAVE AXIS: -15 DEGREES
VENTRICULAR RATE: 89 BPM
WBC # BLD AUTO: 8.23 THOUSAND/UL (ref 4.31–10.16)

## 2024-06-01 PROCEDURE — 99232 SBSQ HOSP IP/OBS MODERATE 35: CPT | Performed by: INTERNAL MEDICINE

## 2024-06-01 PROCEDURE — 85730 THROMBOPLASTIN TIME PARTIAL: CPT | Performed by: INTERNAL MEDICINE

## 2024-06-01 PROCEDURE — 85730 THROMBOPLASTIN TIME PARTIAL: CPT | Performed by: NURSE PRACTITIONER

## 2024-06-01 PROCEDURE — 99233 SBSQ HOSP IP/OBS HIGH 50: CPT | Performed by: STUDENT IN AN ORGANIZED HEALTH CARE EDUCATION/TRAINING PROGRAM

## 2024-06-01 PROCEDURE — 85027 COMPLETE CBC AUTOMATED: CPT | Performed by: NURSE PRACTITIONER

## 2024-06-01 PROCEDURE — 85610 PROTHROMBIN TIME: CPT | Performed by: NURSE PRACTITIONER

## 2024-06-01 RX ORDER — HEPARIN SODIUM 10000 [USP'U]/100ML
3-30 INJECTION, SOLUTION INTRAVENOUS
Status: DISCONTINUED | OUTPATIENT
Start: 2024-06-01 | End: 2024-06-04

## 2024-06-01 RX ADMIN — APIXABAN 5 MG: 5 TABLET, FILM COATED ORAL at 08:30

## 2024-06-01 RX ADMIN — HEPARIN SODIUM 18 UNITS/KG/HR: 10000 INJECTION, SOLUTION INTRAVENOUS at 13:31

## 2024-06-01 RX ADMIN — FUROSEMIDE 40 MG: 10 INJECTION, SOLUTION INTRAMUSCULAR; INTRAVENOUS at 17:15

## 2024-06-01 RX ADMIN — METOPROLOL SUCCINATE 100 MG: 100 TABLET, EXTENDED RELEASE ORAL at 08:30

## 2024-06-01 RX ADMIN — FUROSEMIDE 40 MG: 10 INJECTION, SOLUTION INTRAMUSCULAR; INTRAVENOUS at 08:30

## 2024-06-01 RX ADMIN — LISINOPRIL 10 MG: 10 TABLET ORAL at 08:30

## 2024-06-01 NOTE — PLAN OF CARE
Problem: PAIN - ADULT  Goal: Verbalizes/displays adequate comfort level or baseline comfort level  Description: Interventions:  - Encourage patient to monitor pain and request assistance  - Assess pain using appropriate pain scale  - Administer analgesics based on type and severity of pain and evaluate response  - Implement non-pharmacological measures as appropriate and evaluate response  - Consider cultural and social influences on pain and pain management  - Notify physician/advanced practitioner if interventions unsuccessful or patient reports new pain  Outcome: Progressing     Problem: INFECTION - ADULT  Goal: Absence or prevention of progression during hospitalization  Description: INTERVENTIONS:  - Assess and monitor for signs and symptoms of infection  - Monitor lab/diagnostic results  - Monitor all insertion sites, i.e. indwelling lines, tubes, and drains  - Monitor endotracheal if appropriate and nasal secretions for changes in amount and color  - [place] appropriate cooling/warming therapies per order  - Administer medications as ordered  - Instruct and encourage patient and family to use good hand hygiene technique  - Identify and instruct in appropriate isolation precautions for identified infection/condition  Outcome: Progressing     Problem: SAFETY ADULT  Goal: Patient will remain free of falls  Description: INTERVENTIONS:  - Educate patient/family on patient safety including physical limitations  - Instruct patient to call for assistance with activity   - Consult OT/PT to assist with strengthening/mobility   - Keep Call bell within reach  - Keep bed low and locked with side rails adjusted as appropriate  - Keep care items and personal belongings within reach  - Initiate and maintain comfort rounds  - Make Fall Risk Sign visible to staff  - Offer Toileting every  Hours, in advance of need  - Initiate/Maintain alarm  - Obtain necessary fall risk management equipment:   - Apply yellow socks and  bracelet for high fall risk patients  - Consider moving patient to room near nurses station  Outcome: Progressing  Goal: Maintain or return to baseline ADL function  Description: INTERVENTIONS:  -  Assess patient's ability to carry out ADLs; assess patient's baseline for ADL function and identify physical deficits which impact ability to perform ADLs (bathing, care of mouth/teeth, toileting, grooming, dressing, etc.)  - Assess/evaluate cause of self-care deficits   - Assess range of motion  - Assess patient's mobility; develop plan if impaired  - Assess patient's need for assistive devices and provide as appropriate  - Encourage maximum independence but intervene and supervise when necessary  - Involve family in performance of ADLs  - Assess for home care needs following discharge   - Consider OT consult to assist with ADL evaluation and planning for discharge  - Provide patient education as appropriate  Outcome: Progressing  Goal: Maintains/Returns to pre admission functional level  Description: INTERVENTIONS:  - Perform AM-PAC 6 Click Basic Mobility/ Daily Activity assessment daily.  - Set and communicate daily mobility goal to care team and patient/family/caregiver.   - Collaborate with rehabilitation services on mobility goals if consulted  - Perform Range of Motion  times a day.  - Reposition patient every  hours.  - Dangle patient  times a day  - Stand patient  times a day  - Ambulate patient  times a day  - Out of bed to chair  times a day   - Out of bed for meals  times a day  - Out of bed for toileting  - Record patient progress and toleration of activity level   Outcome: Progressing     Problem: DISCHARGE PLANNING  Goal: Discharge to home or other facility with appropriate resources  Description: INTERVENTIONS:  - Identify barriers to discharge w/patient and caregiver  - Arrange for needed discharge resources and transportation as appropriate  - Identify discharge learning needs (meds, wound care, etc.)  -  Arrange for interpretive services to assist at discharge as needed  - Refer to Case Management Department for coordinating discharge planning if the patient needs post-hospital services based on physician/advanced practitioner order or complex needs related to functional status, cognitive ability, or social support system  Outcome: Progressing     Problem: Knowledge Deficit  Goal: Patient/family/caregiver demonstrates understanding of disease process, treatment plan, medications, and discharge instructions  Description: Complete learning assessment and assess knowledge base.  Interventions:  - Provide teaching at level of understanding  - Provide teaching via preferred learning methods  Outcome: Progressing     Problem: Nutrition/Hydration-ADULT  Goal: Nutrient/Hydration intake appropriate for improving, restoring or maintaining nutritional needs  Description: Monitor and assess patient's nutrition/hydration status for malnutrition. Collaborate with interdisciplinary team and initiate plan and interventions as ordered.  Monitor patient's weight and dietary intake as ordered or per policy. Utilize nutrition screening tool and intervene as necessary. Determine patient's food preferences and provide high-protein, high-caloric foods as appropriate.     INTERVENTIONS:  - Monitor oral intake, urinary output, labs, and treatment plans  - Assess nutrition and hydration status and recommend course of action  - Evaluate amount of meals eaten  - Assist patient with eating if necessary   - Allow adequate time for meals  - Recommend/ encourage appropriate diets, oral nutritional supplements, and vitamin/mineral supplements  - Order, calculate, and assess calorie counts as needed  - Recommend, monitor, and adjust tube feedings and TPN/PPN based on assessed needs  - Assess need for intravenous fluids  - Provide specific nutrition/hydration education as appropriate  - Include patient/family/caregiver in decisions related to  nutrition  Outcome: Progressing

## 2024-06-01 NOTE — PLAN OF CARE
Problem: PAIN - ADULT  Goal: Verbalizes/displays adequate comfort level or baseline comfort level  Description: Interventions:  - Encourage patient to monitor pain and request assistance  - Assess pain using appropriate pain scale  - Administer analgesics based on type and severity of pain and evaluate response  - Implement non-pharmacological measures as appropriate and evaluate response  - Consider cultural and social influences on pain and pain management  - Notify physician/advanced practitioner if interventions unsuccessful or patient reports new pain  Outcome: Progressing     Problem: INFECTION - ADULT  Goal: Absence or prevention of progression during hospitalization  Description: INTERVENTIONS:  - Assess and monitor for signs and symptoms of infection  - Monitor lab/diagnostic results  - Monitor all insertion sites, i.e. indwelling lines, tubes, and drains  - Monitor endotracheal if appropriate and nasal secretions for changes in amount and color  - [place] appropriate cooling/warming therapies per order  - Administer medications as ordered  - Instruct and encourage patient and family to use good hand hygiene technique  - Identify and instruct in appropriate isolation precautions for identified infection/condition  Outcome: Progressing     Problem: SAFETY ADULT  Goal: Patient will remain free of falls  Description: INTERVENTIONS:  - Educate patient/family on patient safety including physical limitations  - Instruct patient to call for assistance with activity   - Consult OT/PT to assist with strengthening/mobility   - Keep Call bell within reach  - Keep bed low and locked with side rails adjusted as appropriate  - Keep care items and personal belongings within reach  - Initiate and maintain comfort rounds  - Make Fall Risk Sign visible to staff  - Offer Toileting every 2 Hours, in advance of need  - Obtain necessary fall risk management equipment: nonskid socks  - Apply yellow socks and bracelet for high  fall risk patients  - Consider moving patient to room near nurses station  Outcome: Progressing  Goal: Maintain or return to baseline ADL function  Description: INTERVENTIONS:  -  Assess patient's ability to carry out ADLs; assess patient's baseline for ADL function and identify physical deficits which impact ability to perform ADLs (bathing, care of mouth/teeth, toileting, grooming, dressing, etc.)  - Assess/evaluate cause of self-care deficits   - Assess range of motion  - Assess patient's mobility; develop plan if impaired  - Assess patient's need for assistive devices and provide as appropriate  - Encourage maximum independence but intervene and supervise when necessary  - Involve family in performance of ADLs  - Assess for home care needs following discharge   - Consider OT consult to assist with ADL evaluation and planning for discharge  - Provide patient education as appropriate  Outcome: Progressing  Goal: Maintains/Returns to pre admission functional level  Description: INTERVENTIONS:  - Perform AM-PAC 6 Click Basic Mobility/ Daily Activity assessment daily.  - Set and communicate daily mobility goal to care team and patient/family/caregiver.   - Collaborate with rehabilitation services on mobility goals if consulted  - Perform Range of Motion 4 times a day.  - Reposition patient every 2 hours.  - Dangle patient 4 times a day  - Stand patient 4 times a day  - Ambulate patient 4 times a day  - Out of bed to chair 4 times a day   - Out of bed for meals 4 times a day  - Out of bed for toileting  - Record patient progress and toleration of activity level   Outcome: Progressing     Problem: DISCHARGE PLANNING  Goal: Discharge to home or other facility with appropriate resources  Description: INTERVENTIONS:  - Identify barriers to discharge w/patient and caregiver  - Arrange for needed discharge resources and transportation as appropriate  - Identify discharge learning needs (meds, wound care, etc.)  - Arrange  for interpretive services to assist at discharge as needed  - Refer to Case Management Department for coordinating discharge planning if the patient needs post-hospital services based on physician/advanced practitioner order or complex needs related to functional status, cognitive ability, or social support system  Outcome: Progressing     Problem: Knowledge Deficit  Goal: Patient/family/caregiver demonstrates understanding of disease process, treatment plan, medications, and discharge instructions  Description: Complete learning assessment and assess knowledge base.  Interventions:  - Provide teaching at level of understanding  - Provide teaching via preferred learning methods  Outcome: Progressing     Problem: Nutrition/Hydration-ADULT  Goal: Nutrient/Hydration intake appropriate for improving, restoring or maintaining nutritional needs  Description: Monitor and assess patient's nutrition/hydration status for malnutrition. Collaborate with interdisciplinary team and initiate plan and interventions as ordered.  Monitor patient's weight and dietary intake as ordered or per policy. Utilize nutrition screening tool and intervene as necessary. Determine patient's food preferences and provide high-protein, high-caloric foods as appropriate.     INTERVENTIONS:  - Monitor oral intake, urinary output, labs, and treatment plans  - Assess nutrition and hydration status and recommend course of action  - Evaluate amount of meals eaten  - Assist patient with eating if necessary   - Allow adequate time for meals  - Recommend/ encourage appropriate diets, oral nutritional supplements, and vitamin/mineral supplements  - Order, calculate, and assess calorie counts as needed  - Recommend, monitor, and adjust tube feedings and TPN/PPN based on assessed needs  - Assess need for intravenous fluids  - Provide specific nutrition/hydration education as appropriate  - Include patient/family/caregiver in decisions related to  nutrition  Outcome: Progressing

## 2024-06-01 NOTE — PLAN OF CARE
Problem: PAIN - ADULT  Goal: Verbalizes/displays adequate comfort level or baseline comfort level  Description: Interventions:  - Encourage patient to monitor pain and request assistance  - Assess pain using appropriate pain scale  - Administer analgesics based on type and severity of pain and evaluate response  - Implement non-pharmacological measures as appropriate and evaluate response  - Consider cultural and social influences on pain and pain management  - Notify physician/advanced practitioner if interventions unsuccessful or patient reports new pain  Outcome: Progressing     Problem: INFECTION - ADULT  Goal: Absence or prevention of progression during hospitalization  Description: INTERVENTIONS:  - Assess and monitor for signs and symptoms of infection  - Monitor lab/diagnostic results  - Monitor all insertion sites, i.e. indwelling lines, tubes, and drains  - Monitor endotracheal if appropriate and nasal secretions for changes in amount and color  - [place] appropriate cooling/warming therapies per order  - Administer medications as ordered  - Instruct and encourage patient and family to use good hand hygiene technique  - Identify and instruct in appropriate isolation precautions for identified infection/condition  Outcome: Progressing     Problem: SAFETY ADULT  Goal: Patient will remain free of falls  Description: INTERVENTIONS:  - Educate patient/family on patient safety including physical limitations  - Instruct patient to call for assistance with activity   - Consult OT/PT to assist with strengthening/mobility   - Keep Call bell within reach  - Keep bed low and locked with side rails adjusted as appropriate  - Keep care items and personal belongings within reach  - Initiate and maintain comfort rounds  - Make Fall Risk Sign visible to staff  - Offer Toileting every 2 Hours, in advance of need  - Obtain necessary fall risk management equipment: nonskid socks  - Apply yellow socks and bracelet for high  fall risk patients  - Consider moving patient to room near nurses station  Outcome: Progressing  Goal: Maintain or return to baseline ADL function  Description: INTERVENTIONS:  -  Assess patient's ability to carry out ADLs; assess patient's baseline for ADL function and identify physical deficits which impact ability to perform ADLs (bathing, care of mouth/teeth, toileting, grooming, dressing, etc.)  - Assess/evaluate cause of self-care deficits   - Assess range of motion  - Assess patient's mobility; develop plan if impaired  - Assess patient's need for assistive devices and provide as appropriate  - Encourage maximum independence but intervene and supervise when necessary  - Involve family in performance of ADLs  - Assess for home care needs following discharge   - Consider OT consult to assist with ADL evaluation and planning for discharge  - Provide patient education as appropriate  Outcome: Progressing  Goal: Maintains/Returns to pre admission functional level  Description: INTERVENTIONS:  - Perform AM-PAC 6 Click Basic Mobility/ Daily Activity assessment daily.  - Set and communicate daily mobility goal to care team and patient/family/caregiver.   - Collaborate with rehabilitation services on mobility goals if consulted  - Perform Range of Motion 4 times a day.  - Reposition patient every 2 hours.  - Dangle patient 4 times a day  - Stand patient 4 times a day  - Ambulate patient 4 times a day  - Out of bed to chair 4 times a day   - Out of bed for meals 4 times a day  - Out of bed for toileting  - Record patient progress and toleration of activity level   Outcome: Progressing     Problem: DISCHARGE PLANNING  Goal: Discharge to home or other facility with appropriate resources  Description: INTERVENTIONS:  - Identify barriers to discharge w/patient and caregiver  - Arrange for needed discharge resources and transportation as appropriate  - Identify discharge learning needs (meds, wound care, etc.)  - Arrange  for interpretive services to assist at discharge as needed  - Refer to Case Management Department for coordinating discharge planning if the patient needs post-hospital services based on physician/advanced practitioner order or complex needs related to functional status, cognitive ability, or social support system  Outcome: Progressing     Problem: Knowledge Deficit  Goal: Patient/family/caregiver demonstrates understanding of disease process, treatment plan, medications, and discharge instructions  Description: Complete learning assessment and assess knowledge base.  Interventions:  - Provide teaching at level of understanding  - Provide teaching via preferred learning methods  Outcome: Progressing     Problem: Nutrition/Hydration-ADULT  Goal: Nutrient/Hydration intake appropriate for improving, restoring or maintaining nutritional needs  Description: Monitor and assess patient's nutrition/hydration status for malnutrition. Collaborate with interdisciplinary team and initiate plan and interventions as ordered.  Monitor patient's weight and dietary intake as ordered or per policy. Utilize nutrition screening tool and intervene as necessary. Determine patient's food preferences and provide high-protein, high-caloric foods as appropriate.     INTERVENTIONS:  - Monitor oral intake, urinary output, labs, and treatment plans  - Assess nutrition and hydration status and recommend course of action  - Evaluate amount of meals eaten  - Assist patient with eating if necessary   - Allow adequate time for meals  - Recommend/ encourage appropriate diets, oral nutritional supplements, and vitamin/mineral supplements  - Order, calculate, and assess calorie counts as needed  - Recommend, monitor, and adjust tube feedings and TPN/PPN based on assessed needs  - Assess need for intravenous fluids  - Provide specific nutrition/hydration education as appropriate  - Include patient/family/caregiver in decisions related to  nutrition  Outcome: Progressing

## 2024-06-01 NOTE — ASSESSMENT & PLAN NOTE
Secondary to new onset afib resulting in sub optimal cardiac output  BNP never collected   Started on IV lasix, net otput 2.3 liters   Cardiology following  ECHO with reduced EF, needs cardiac cath after further diuresis

## 2024-06-01 NOTE — PROGRESS NOTES
[facility]  Progress Note  Name: Antonio Mckeon I  MRN: [id number]  Unit/Bed#: 2 E 283-01 I Date of Admission: 5/29/2024   Date of Service: 6/1/2024 I Hospital Day: 3    Assessment & Plan   * Volume overload  Assessment & Plan  Secondary to new onset afib resulting in sub optimal cardiac output  BNP never collected   Started on IV lasix, net otput 2.3 liters   Cardiology following  ECHO with reduced EF, needs cardiac cath after further diuresis     New onset atrial fibrillation with RVR (HCC)  Assessment & Plan  New onset afib with RVR   Improving today   Status post SPENCER and cardioversion, now better rate controlled  Eliquis price checked- no copay  Cardiology following, see recommendations    Primary hypertension  Assessment & Plan  Better controlled  Monitor                VTE Pharmacologic Prophylaxis: VTE Score: 3 Moderate Risk (Score 3-4) - Pharmacological DVT Prophylaxis Ordered: apixaban (Eliquis).    Mobility:   Basic Mobility Inpatient Raw Score: 24  JH-HLM Goal: 8: Walk 250 feet or more  JH-HLM Achieved: 7: Walk 25 feet or more  JH-HLM Goal achieved. Continue to encourage appropriate mobility.    Patient Centered Rounds: I performed bedside rounds with nursing staff today.   Discussions with Specialists or Other Care Team Provider: maria isabel    Education and Discussions with Family / Patient: Updated  (wife) at bedside.    Total Time Spent on Date of Encounter in care of patient: 30+ mins. This time was spent on one or more of the following: performing physical exam; counseling and coordination of care; obtaining or reviewing history; documenting in the medical record; reviewing/ordering tests, medications or procedures; communicating with other healthcare professionals and discussing with patient's family/caregivers.    Current Length of Stay: 3 day(s)  Current Patient Status: Inpatient   Certification Statement: The patient will continue to require additional inpatient  hospital stay due to cardiac cath, IV diuresis   Discharge Plan: Anticipate discharge in 48-72 hrs to home.    Code Status: Level 1 - Full Code    Subjective:   Patient feels better     Objective:     Vitals:   Temp (24hrs), Av.8 °F (36.6 °C), Min:97.5 °F (36.4 °C), Max:98 °F (36.7 °C)    Temp:  [97.5 °F (36.4 °C)-98 °F (36.7 °C)] 98 °F (36.7 °C)  HR:  [66-92] 76  Resp:  [16-18] 18  BP: (121-152)/() 134/92  SpO2:  [88 %-97 %] 91 %  Body mass index is 38.47 kg/m².     Input and Output Summary (last 24 hours):     Intake/Output Summary (Last 24 hours) at 2024 0950  Last data filed at 2024 0557  Gross per 24 hour   Intake 610 ml   Output 1300 ml   Net -690 ml       Physical Exam:   Physical Exam  Constitutional:       General: He is not in acute distress.     Appearance: Normal appearance. He is not toxic-appearing.   Cardiovascular:      Rate and Rhythm: Normal rate and regular rhythm.      Heart sounds: Normal heart sounds. No murmur heard.  Pulmonary:      Effort: Pulmonary effort is normal. No respiratory distress.      Breath sounds: Normal breath sounds. No wheezing.   Abdominal:      General: Abdomen is flat. There is no distension.      Palpations: Abdomen is soft.      Tenderness: There is no abdominal tenderness.   Neurological:      General: No focal deficit present.      Mental Status: He is alert and oriented to person, place, and time. Mental status is at baseline.      Motor: No weakness.          Additional Data:     Labs:  Results from last 7 days   Lab Units 24  0748   WBC Thousand/uL 8.98   HEMOGLOBIN g/dL 13.1   HEMATOCRIT % 40.1   PLATELETS Thousands/uL 289   SEGS PCT % 73   LYMPHO PCT % 15   MONO PCT % 10   EOS PCT % 1     Results from last 7 days   Lab Units 24  0541 242   SODIUM mmol/L 141 140   POTASSIUM mmol/L 3.9 3.7   CHLORIDE mmol/L 106 105   CO2 mmol/L 27 25   BUN mg/dL 18 14   CREATININE mg/dL 1.05 0.96   ANION GAP mmol/L 8 10   CALCIUM mg/dL 8.9  9.6   ALBUMIN g/dL  --  4.4   TOTAL BILIRUBIN mg/dL  --  1.51*   ALK PHOS U/L  --  93   ALT U/L  --  35   AST U/L  --  29   GLUCOSE RANDOM mg/dL 86 114     Results from last 7 days   Lab Units 05/30/24  0044   INR  1.22*                   Lines/Drains:  Invasive Devices       Peripheral Intravenous Line  Duration             Peripheral IV 05/30/24 Right;Ventral (anterior) Hand 2 days                      Telemetry:  Telemetry Orders (From admission, onward)               24 Hour Telemetry Monitoring  Continuous x 24 Hours (Telem)        Question:  Reason for 24 Hour Telemetry  Answer:  Arrhythmias requiring acute medical intervention / PPM or ICD malfunction                              Imaging: No pertinent imaging reviewed.    Recent Cultures (last 7 days):         Last 24 Hours Medication List:   Current Facility-Administered Medications   Medication Dose Route Frequency Provider Last Rate    acetaminophen  650 mg Oral Q4H PRN Charlee Arreaga PA-C      apixaban  5 mg Oral BID Oliver Apple MD      furosemide  40 mg Intravenous BID Charlee Arreaga PA-C      lisinopril  10 mg Oral Daily Charlee Arreaga PA-C      metoprolol succinate  100 mg Oral Daily LORELEI Smith          Today, Patient Was Seen By: Oliver Coles MD    **Please Note: This note may have been constructed using a voice recognition system.**

## 2024-06-01 NOTE — PROGRESS NOTES
General Cardiology   Progress Note   Antonio Mckeon 53 y.o. male MRN: [id number]  Unit/Bed#: 2 E 283-01 Encounter: [id number]    Assessment/Plan:    New onset atrial fibrillation with RVR  - S/p SPENCER and DCCV yesterday, Maintaining NSR with a rate in the 80's  - continue Toprol 100 mg daily   - Given patient's need for an ischemic evaluation will discontinue Eliquis and start heparin drip for cardiac catheterization on Monday ([id number] score of 2)  -continue to monitor heart rate trends on telemetry  -TTE performed yesterday revealed new low EF of 40% with moderate global hypokinesis and grade 2 diastolic dysfunction     2.  New onset acute HFrEF/new cardiomyopathy with an EF of 40%  -Continue IV Lasix 40 mg twice daily, metoprolol succinate and lisinopril  - TTE revealed new low EF of 40%  - Strict I's and O's and daily weights  - Recommend low-sodium diet  - Plan for coronary angiography on Monday to investigate etiology of new cardiomyopathy/heart failure/atrial fibrillation     3.  Hypertensive urgency  - Blood pressures have overall improved  - Continue lisinopril and metoprolol tartrate   -Continue to monitor blood pressures closely     4.  Rheumatoid arthritis      Subjective:   Patient seen and examined. No significant events since the last encounter.     REVIEW OF SYSTEMS:  Constitutional:  Denies fever or chills   Eyes:  Denies change in visual acuity   HENT:  Denies nasal congestion or sore throat   Respiratory:  Denies cough, orthopnea, PND or shortness of breath   Cardiovascular:  Denies chest pain, palpitations or edema   GI:  Denies abdominal pain, nausea, vomiting, bloody stools, constipation or diarrhea   :  Denies dysuria, frequency, difficulty in urination or nocturia  Musculoskeletal:  Denies back pain or joint pain   Neurologic:  Denies headache, focal weakness or sensory changes   Endocrine:  Denies polyuria or polydipsia   Lymphatic:  Denies swollen glands   Psychiatric:  Denies depression  or anxiety     Objective:   Vitals:  Vitals:    24 1107   BP: 127/88   Pulse: 85   Resp: 18   Temp: 98.3 °F (36.8 °C)   SpO2: 92%       Body mass index is 38.47 kg/m².  Systolic (24hrs), Av , Min:121 , Max:152     Diastolic (24hrs), Av, Min:80, Max:112      Intake/Output Summary (Last 24 hours) at 2024 1121  Last data filed at 2024 0557  Gross per 24 hour   Intake 610 ml   Output 1300 ml   Net -690 ml     Weight (last 2 days)       Date/Time Weight    24 0557 125 (275.8)    24 0810 130 (286)    24 0543 130 (286.6)    24 13:12:20 130 (287.4)            Telemetry Review: No significant arrhythmias seen on telemetry review.         PHYSICAL EXAMS  General:  Patient is not in acute distress, laying in the bed comfortably, awake  Head: Normocephalic, Atraumatic.   HEENT: White sclera, pink conjunctiva  Neck:  Supple, no neck vein distention  Respiratory: clear to auscultation   Cardiovascular: S1-S2 normal, no murmurs, thrills, gallops, rubs, regular rhythm  GI:  Abdomen soft, nontender, non-distended  Extremities: No edema, normal pulses  Integument:  No skin rashes or ulceration  Neurologic:  Patient is awake alert, responding to command, oriented to person, place and time    LABORATORY RESULTS:      CBC with diff:   Results from last 7 days   Lab Units 24  0748 24   WBC Thousand/uL 8.98 10.00   HEMOGLOBIN g/dL 13.1 14.6   HEMATOCRIT % 40.1 46.6   MCV fL 86 88   PLATELETS Thousands/uL 289 320   RBC Million/uL 4.68 5.32   MCH pg 28.0 27.4   MCHC g/dL 32.7 31.3*   RDW % 14.4 14.4   MPV fL 10.7 11.2   NRBC AUTO /100 WBCs 0 0       CMP:  Results from last 7 days   Lab Units 24  0541 24   POTASSIUM mmol/L 3.9 3.7   CHLORIDE mmol/L 106 105   CO2 mmol/L 27 25   BUN mg/dL 18 14   CREATININE mg/dL 1.05 0.96   CALCIUM mg/dL 8.9 9.6   AST U/L  --  29   ALT U/L  --  35   ALK PHOS U/L  --  93   EGFR ml/min/1.73sq m 79 88       BMP:  Results from last  "7 days   Lab Units 05/31/24  0541 05/29/24 2022   POTASSIUM mmol/L 3.9 3.7   CHLORIDE mmol/L 106 105   CO2 mmol/L 27 25   BUN mg/dL 18 14   CREATININE mg/dL 1.05 0.96   CALCIUM mg/dL 8.9 9.6              Results from last 7 days   Lab Units 05/31/24  1228 05/31/24  0541   MAGNESIUM mg/dL 3.0* 1.7*         Results from last 7 days   Lab Units 05/29/24 2022   TSH 3RD GENERATON uIU/mL 2.844     Results from last 7 days   Lab Units 05/30/24  0044   INR  1.22*       Lipid Profile:   No results found for: \"CHOL\"  No results found for: \"HDL\"  No results found for: \"LDLCALC\"  No results found for: \"TRIG\"    Cardiac testing:   No results found for this or any previous visit.    No results found for this or any previous visit.    No results found for this or any previous visit.    No valid procedures specified.  No results found for this or any previous visit.      Meds/Allergies   all current active meds have been reviewed    Medications Prior to Admission:     hydroxychloroquine (PLAQUENIL) 200 mg tablet    lisinopril (ZESTRIL) 10 mg tablet    meloxicam (MOBIC) 15 mg tablet           ** Please Note: Dragon 360 Dictation voice to text software may have been used in the creation of this document. **    "

## 2024-06-01 NOTE — ANESTHESIA POSTPROCEDURE EVALUATION
Post-Op Assessment Note    CV Status:  Stable  Pain Score: 0    Pain management: adequate       Mental Status:  Alert   Hydration Status:  Stable   PONV Controlled:  Controlled   Airway Patency:  Patent     Post Op Vitals Reviewed: Yes    No anethesia notable event occurred.    Staff: Anesthesiologist               BP      Temp      Pulse     Resp      SpO2

## 2024-06-01 NOTE — ASSESSMENT & PLAN NOTE
New onset afib with RVR   Improving today   Status post SPENCER and cardioversion, now better rate controlled  Eliquis price checked- no copay  Cardiology following, see recommendations

## 2024-06-02 PROBLEM — I42.9 CARDIOMYOPATHY (HCC): Status: ACTIVE | Noted: 2024-05-29

## 2024-06-02 LAB
ANION GAP SERPL CALCULATED.3IONS-SCNC: 8 MMOL/L (ref 4–13)
APTT PPP: 102 SECONDS (ref 23–37)
APTT PPP: 33 SECONDS (ref 23–37)
APTT PPP: 71 SECONDS (ref 23–37)
APTT PPP: 91 SECONDS (ref 23–37)
BASOPHILS # BLD AUTO: 0.06 THOUSANDS/ÂΜL (ref 0–0.1)
BASOPHILS NFR BLD AUTO: 1 % (ref 0–1)
BUN SERPL-MCNC: 17 MG/DL (ref 5–25)
CALCIUM SERPL-MCNC: 8.9 MG/DL (ref 8.4–10.2)
CHLORIDE SERPL-SCNC: 103 MMOL/L (ref 96–108)
CO2 SERPL-SCNC: 31 MMOL/L (ref 21–32)
CREAT SERPL-MCNC: 0.99 MG/DL (ref 0.6–1.3)
EOSINOPHIL # BLD AUTO: 0.24 THOUSAND/ÂΜL (ref 0–0.61)
EOSINOPHIL NFR BLD AUTO: 4 % (ref 0–6)
ERYTHROCYTE [DISTWIDTH] IN BLOOD BY AUTOMATED COUNT: 14.1 % (ref 11.6–15.1)
GFR SERPL CREATININE-BSD FRML MDRD: 85 ML/MIN/1.73SQ M
GLUCOSE SERPL-MCNC: 97 MG/DL (ref 65–140)
HCT VFR BLD AUTO: 39.9 % (ref 36.5–49.3)
HGB BLD-MCNC: 13 G/DL (ref 12–17)
IMM GRANULOCYTES # BLD AUTO: 0.02 THOUSAND/UL (ref 0–0.2)
IMM GRANULOCYTES NFR BLD AUTO: 0 % (ref 0–2)
LYMPHOCYTES # BLD AUTO: 1.73 THOUSANDS/ÂΜL (ref 0.6–4.47)
LYMPHOCYTES NFR BLD AUTO: 26 % (ref 14–44)
MAGNESIUM SERPL-MCNC: 1.8 MG/DL (ref 1.9–2.7)
MCH RBC QN AUTO: 28.3 PG (ref 26.8–34.3)
MCHC RBC AUTO-ENTMCNC: 32.6 G/DL (ref 31.4–37.4)
MCV RBC AUTO: 87 FL (ref 82–98)
MONOCYTES # BLD AUTO: 0.77 THOUSAND/ÂΜL (ref 0.17–1.22)
MONOCYTES NFR BLD AUTO: 11 % (ref 4–12)
NEUTROPHILS # BLD AUTO: 3.97 THOUSANDS/ÂΜL (ref 1.85–7.62)
NEUTS SEG NFR BLD AUTO: 58 % (ref 43–75)
NRBC BLD AUTO-RTO: 0 /100 WBCS
PLATELET # BLD AUTO: 241 THOUSANDS/UL (ref 149–390)
PMV BLD AUTO: 10.4 FL (ref 8.9–12.7)
POTASSIUM SERPL-SCNC: 3.6 MMOL/L (ref 3.5–5.3)
QRS AXIS: -5 DEGREES
QRSD INTERVAL: 180 MS
QT INTERVAL: 592 MS
QTC INTERVAL: 790 MS
RBC # BLD AUTO: 4.59 MILLION/UL (ref 3.88–5.62)
SODIUM SERPL-SCNC: 142 MMOL/L (ref 135–147)
T WAVE AXIS: 26 DEGREES
VENTRICULAR RATE: 107 BPM
WBC # BLD AUTO: 6.79 THOUSAND/UL (ref 4.31–10.16)

## 2024-06-02 PROCEDURE — 85730 THROMBOPLASTIN TIME PARTIAL: CPT | Performed by: STUDENT IN AN ORGANIZED HEALTH CARE EDUCATION/TRAINING PROGRAM

## 2024-06-02 PROCEDURE — 93005 ELECTROCARDIOGRAM TRACING: CPT

## 2024-06-02 PROCEDURE — 85025 COMPLETE CBC W/AUTO DIFF WBC: CPT | Performed by: STUDENT IN AN ORGANIZED HEALTH CARE EDUCATION/TRAINING PROGRAM

## 2024-06-02 PROCEDURE — 80048 BASIC METABOLIC PNL TOTAL CA: CPT | Performed by: STUDENT IN AN ORGANIZED HEALTH CARE EDUCATION/TRAINING PROGRAM

## 2024-06-02 PROCEDURE — 83735 ASSAY OF MAGNESIUM: CPT | Performed by: STUDENT IN AN ORGANIZED HEALTH CARE EDUCATION/TRAINING PROGRAM

## 2024-06-02 PROCEDURE — 99232 SBSQ HOSP IP/OBS MODERATE 35: CPT | Performed by: INTERNAL MEDICINE

## 2024-06-02 RX ADMIN — HEPARIN SODIUM 20 UNITS/KG/HR: 10000 INJECTION, SOLUTION INTRAVENOUS at 21:14

## 2024-06-02 RX ADMIN — HEPARIN SODIUM 18 UNITS/KG/HR: 10000 INJECTION, SOLUTION INTRAVENOUS at 01:38

## 2024-06-02 RX ADMIN — FUROSEMIDE 40 MG: 10 INJECTION, SOLUTION INTRAMUSCULAR; INTRAVENOUS at 17:40

## 2024-06-02 RX ADMIN — METOPROLOL SUCCINATE 100 MG: 100 TABLET, EXTENDED RELEASE ORAL at 08:28

## 2024-06-02 RX ADMIN — LISINOPRIL 10 MG: 10 TABLET ORAL at 08:28

## 2024-06-02 RX ADMIN — FUROSEMIDE 40 MG: 10 INJECTION, SOLUTION INTRAMUSCULAR; INTRAVENOUS at 08:27

## 2024-06-02 NOTE — PROGRESS NOTES
It was noted around 0600, patient flipped back into atrial fibrillation. Patient completely asymptomatic, asleep at the time. Vitals taken, /113 with a HR of 107. EKG completed. On call Efren ZAMAN, made aware of switch from sinus rhythm to afib. Also made aware of magnesium level. No new orders at this time.

## 2024-06-02 NOTE — PROGRESS NOTES
[facility]  Progress Note  Name: Antonio Mckeon I  MRN: [id number]  Unit/Bed#: 2 E 283-01 I Date of Admission: 5/29/2024   Date of Service: 6/2/2024 I Hospital Day: 4    Assessment & Plan   * Cardiomyopathy (HCC)  Assessment & Plan  Secondary to new onset afib resulting in sub optimal cardiac output  Unclear if ischemic or nonischemic, pending cardiac cath  BNP never collected   Started on IV lasix, net otput 3.9 liters   ECHO with reduced EF, needs cardiac cath after further diuresis   Cardiology following    New onset atrial fibrillation with RVR (HCC)  Assessment & Plan  New onset afib with RVR   Status post SPENCER and cardioversion, now better rate controlled  Eliquis price checked- no copay  Cardiology following, see recommendations    Primary hypertension  Assessment & Plan  Better controlled  Monitor            VTE Pharmacologic Prophylaxis: VTE Score: 3 Moderate Risk (Score 3-4) - Pharmacological DVT Prophylaxis Ordered: apixaban (Eliquis).    Mobility:   Basic Mobility Inpatient Raw Score: 24  JH-HLM Goal: 8: Walk 250 feet or more  JH-HLM Achieved: 8: Walk 250 feet ot more  JH-HLM Goal achieved. Continue to encourage appropriate mobility.    Patient Centered Rounds: I performed bedside rounds with nursing staff today.   Discussions with Specialists or Other Care Team Provider: cardiology     Total Time Spent on Date of Encounter in care of patient: 30+ mins. This time was spent on one or more of the following: performing physical exam; counseling and coordination of care; obtaining or reviewing history; documenting in the medical record; reviewing/ordering tests, medications or procedures; communicating with other healthcare professionals and discussing with patient's family/caregivers.    Current Length of Stay: 4 day(s)  Current Patient Status: Inpatient   Certification Statement: The patient will continue to require additional inpatient hospital stay due to cardiac cath   Discharge  Plan: Anticipate discharge in 24-48 hrs to home.    Code Status: Level 1 - Full Code    Subjective:   Patient feels better today    Objective:     Vitals:   Temp (24hrs), Av °F (36.7 °C), Min:97.8 °F (36.6 °C), Max:98.3 °F (36.8 °C)    Temp:  [97.8 °F (36.6 °C)-98.3 °F (36.8 °C)] 98 °F (36.7 °C)  HR:  [] 90  Resp:  [18] 18  BP: (127-168)/() 146/107  SpO2:  [88 %-98 %] 91 %  Body mass index is 37.21 kg/m².     Input and Output Summary (last 24 hours):     Intake/Output Summary (Last 24 hours) at 2024 1011  Last data filed at 2024 0908  Gross per 24 hour   Intake 300 ml   Output 2300 ml   Net -2000 ml       Physical Exam:   Physical Exam  Constitutional:       General: He is not in acute distress.     Appearance: Normal appearance. He is not toxic-appearing.   Cardiovascular:      Rate and Rhythm: Normal rate and regular rhythm.      Heart sounds: Normal heart sounds. No murmur heard.  Pulmonary:      Effort: Pulmonary effort is normal. No respiratory distress.      Breath sounds: Normal breath sounds. No wheezing.   Abdominal:      General: Abdomen is flat. There is no distension.      Palpations: Abdomen is soft.      Tenderness: There is no abdominal tenderness.   Neurological:      General: No focal deficit present.      Mental Status: He is alert and oriented to person, place, and time. Mental status is at baseline.      Motor: No weakness.          Additional Data:     Labs:  Results from last 7 days   Lab Units 24  013   WBC Thousand/uL 6.79   HEMOGLOBIN g/dL 13.0   HEMATOCRIT % 39.9   PLATELETS Thousands/uL 241   SEGS PCT % 58   LYMPHO PCT % 26   MONO PCT % 11   EOS PCT % 4     Results from last 7 days   Lab Units 24  0134 24  0541 24   SODIUM mmol/L 142   < > 140   POTASSIUM mmol/L 3.6   < > 3.7   CHLORIDE mmol/L 103   < > 105   CO2 mmol/L 31   < > 25   BUN mg/dL 17   < > 14   CREATININE mg/dL 0.99   < > 0.96   ANION GAP mmol/L 8   < > 10   CALCIUM mg/dL  8.9   < > 9.6   ALBUMIN g/dL  --   --  4.4   TOTAL BILIRUBIN mg/dL  --   --  1.51*   ALK PHOS U/L  --   --  93   ALT U/L  --   --  35   AST U/L  --   --  29   GLUCOSE RANDOM mg/dL 97   < > 114    < > = values in this interval not displayed.     Results from last 7 days   Lab Units 06/01/24  1328   INR  1.37*                   Lines/Drains:  Invasive Devices       Peripheral Intravenous Line  Duration             Peripheral IV 05/30/24 Right;Ventral (anterior) Hand 3 days    Peripheral IV 06/01/24 Distal;Dorsal (posterior);Right Forearm <1 day                      Telemetry:  Telemetry Orders (From admission, onward)               24 Hour Telemetry Monitoring  Continuous x 24 Hours (Telem)        Expiring   Question:  Reason for 24 Hour Telemetry  Answer:  Arrhythmias requiring acute medical intervention / PPM or ICD malfunction                                Imaging: No pertinent imaging reviewed.    Recent Cultures (last 7 days):         Last 24 Hours Medication List:   Current Facility-Administered Medications   Medication Dose Route Frequency Provider Last Rate    acetaminophen  650 mg Oral Q4H PRN Charlee Arreaga PA-C      furosemide  40 mg Intravenous BID Charlee Arreaga PA-C      heparin (porcine)  3-30 Units/kg/hr (Order-Specific) Intravenous Titrated LORELEI Smith 16 Units/kg/hr (06/02/24 0850)    lisinopril  10 mg Oral Daily Charlee Arreaga PA-C      metoprolol succinate  100 mg Oral Daily LORELEI Smith          Today, Patient Was Seen By: Oliver Coles MD    **Please Note: This note may have been constructed using a voice recognition system.**

## 2024-06-02 NOTE — PLAN OF CARE
Problem: INFECTION - ADULT  Goal: Absence or prevention of progression during hospitalization  Description: INTERVENTIONS:  - Assess and monitor for signs and symptoms of infection  - Monitor lab/diagnostic results  - Monitor all insertion sites, i.e. indwelling lines, tubes, and drains  - Monitor endotracheal if appropriate and nasal secretions for changes in amount and color  - [place] appropriate cooling/warming therapies per order  - Administer medications as ordered  - Instruct and encourage patient and family to use good hand hygiene technique  - Identify and instruct in appropriate isolation precautions for identified infection/condition  Outcome: Progressing     Problem: PAIN - ADULT  Goal: Verbalizes/displays adequate comfort level or baseline comfort level  Description: Interventions:  - Encourage patient to monitor pain and request assistance  - Assess pain using appropriate pain scale  - Administer analgesics based on type and severity of pain and evaluate response  - Implement non-pharmacological measures as appropriate and evaluate response  - Consider cultural and social influences on pain and pain management  - Notify physician/advanced practitioner if interventions unsuccessful or patient reports new pain  Outcome: Progressing

## 2024-06-02 NOTE — ASSESSMENT & PLAN NOTE
Secondary to new onset afib resulting in sub optimal cardiac output  Unclear if ischemic or nonischemic, pending cardiac cath  BNP never collected   Started on IV lasix, net otput 3.9 liters   ECHO with reduced EF, needs cardiac cath after further diuresis   Cardiology following

## 2024-06-02 NOTE — PROGRESS NOTES
General Cardiology   Progress Note   Antonio Mckeon 53 y.o. male MRN: [id number]  Unit/Bed#: 2 E 283-01 Encounter: [id number]    Assessment/Plan:    New onset atrial fibrillation with RVR  - S/p SPENCER and DCCV yesterday, had been maintaining NSR but reverted back to atrial fibrillation with RVR, heart rates currently in the 110s to 120s   - continue Toprol 100 mg daily   - Continue heparin drip for cardiac catheterization on Monday ([id number] score of 2)  -continue to monitor heart rate trends on telemetry  -TTE performed this admission revealed new low EF of 40% with moderate global hypokinesis and grade 2 diastolic dysfunction  -Discussed plan for outpatient referral to EP for evaluation for ablation     2.  New onset acute HFrEF/new cardiomyopathy with an EF of 40%  -Continue IV Lasix 40 mg twice daily, metoprolol succinate and lisinopril  - TTE revealed new low EF of 40%  - Strict I's and O's and daily weights  - Recommend low-sodium diet  - Plan for coronary angiography on Monday to investigate etiology of new cardiomyopathy/heart failure/atrial fibrillation     3.  Hypertensive urgency  - Blood pressures have overall improved  - Continue lisinopril and metoprolol tartrate   -Continue to monitor blood pressures closely     4.  Rheumatoid arthritis      Subjective:   Patient seen and examined. No significant events since the last encounter.     REVIEW OF SYSTEMS:  Constitutional:  Denies fever or chills   Eyes:  Denies change in visual acuity   HENT:  Denies nasal congestion or sore throat   Respiratory:  Denies cough, orthopnea, PND or shortness of breath   Cardiovascular:  Denies chest pain, palpitations or edema   GI:  Denies abdominal pain, nausea, vomiting, bloody stools, constipation or diarrhea   :  Denies dysuria, frequency, difficulty in urination or nocturia  Musculoskeletal:  Denies back pain or joint pain   Neurologic:  Denies headache, focal weakness or sensory changes   Endocrine:  Denies  polyuria or polydipsia   Lymphatic:  Denies swollen glands   Psychiatric:  Denies depression or anxiety     Objective:   Vitals:  Vitals:    24 0923   BP: (!) 146/107   Pulse: 90   Resp:    Temp:    SpO2: 91%       Body mass index is 37.21 kg/m².  Systolic (24hrs), Av , Min:135 , Max:168     Diastolic (24hrs), Av, Min:97, Max:113      Intake/Output Summary (Last 24 hours) at 2024 1200  Last data filed at 2024 0908  Gross per 24 hour   Intake 300 ml   Output 1200 ml   Net -900 ml     Weight (last 2 days)       Date/Time Weight    24 0531 121 (266.8)    24 0515 121 (266.8)    24 0557 125 (275.8)    24 0810 130 (286)    24 0543 130 (286.6)            Telemetry Review: Atrial fibrillation with RVR      PHYSICAL EXAMS  General:  Patient is not in acute distress, laying in the bed comfortably, awake  Head: Normocephalic, Atraumatic.   HEENT: White sclera, pink conjunctiva  Neck:  Supple, no neck vein distention  Respiratory: clear to auscultation   Cardiovascular: + irregularly irregular, no murmurs, thrills, gallops, rubs, regular rhythm  GI:  Abdomen soft, nontender, non-distended  Extremities: No edema, normal pulses  Integument:  No skin rashes or ulceration  Neurologic:  Patient is awake alert, responding to command, oriented to person, place and time    LABORATORY RESULTS:      CBC with diff:   Results from last 7 days   Lab Units 24  0134 24  1328 24  0748 24   WBC Thousand/uL 6.79 8.23 8.98 10.00   HEMOGLOBIN g/dL 13.0 13.3 13.1 14.6   HEMATOCRIT % 39.9 40.8 40.1 46.6   MCV fL 87 86 86 88   PLATELETS Thousands/uL 241 304 289 320   RBC Million/uL 4.59 4.72 4.68 5.32   MCH pg 28.3 28.2 28.0 27.4   MCHC g/dL 32.6 32.6 32.7 31.3*   RDW % 14.1 14.1 14.4 14.4   MPV fL 10.4 10.5 10.7 11.2   NRBC AUTO /100 WBCs 0  --  0 0       CMP:  Results from last 7 days   Lab Units 24  0134 24  0541 24   POTASSIUM mmol/L 3.6  "3.9 3.7   CHLORIDE mmol/L 103 106 105   CO2 mmol/L 31 27 25   BUN mg/dL 17 18 14   CREATININE mg/dL 0.99 1.05 0.96   CALCIUM mg/dL 8.9 8.9 9.6   AST U/L  --   --  29   ALT U/L  --   --  35   ALK PHOS U/L  --   --  93   EGFR ml/min/1.73sq m 85 79 88       BMP:  Results from last 7 days   Lab Units 06/02/24  0134 05/31/24  0541 05/29/24  2022   POTASSIUM mmol/L 3.6 3.9 3.7   CHLORIDE mmol/L 103 106 105   CO2 mmol/L 31 27 25   BUN mg/dL 17 18 14   CREATININE mg/dL 0.99 1.05 0.96   CALCIUM mg/dL 8.9 8.9 9.6              Results from last 7 days   Lab Units 06/02/24  0134 05/31/24  1228 05/31/24  0541   MAGNESIUM mg/dL 1.8* 3.0* 1.7*         Results from last 7 days   Lab Units 05/29/24  2022   TSH 3RD GENERATON uIU/mL 2.844     Results from last 7 days   Lab Units 06/01/24  1328 05/30/24  0044   INR  1.37* 1.22*       Lipid Profile:   No results found for: \"CHOL\"  No results found for: \"HDL\"  No results found for: \"LDLCALC\"  No results found for: \"TRIG\"    Cardiac testing:   No results found for this or any previous visit.    No results found for this or any previous visit.    No results found for this or any previous visit.    No valid procedures specified.  No results found for this or any previous visit.      Meds/Allergies   all current active meds have been reviewed    Medications Prior to Admission:     hydroxychloroquine (PLAQUENIL) 200 mg tablet    lisinopril (ZESTRIL) 10 mg tablet    meloxicam (MOBIC) 15 mg tablet    heparin (porcine), 3-30 Units/kg/hr (Order-Specific), Last Rate: 16 Units/kg/hr (06/02/24 0850)          ** Please Note: Dragon 360 Dictation voice to text software may have been used in the creation of this document. **    "

## 2024-06-02 NOTE — ASSESSMENT & PLAN NOTE
New onset afib with RVR   Status post SPENCER and cardioversion, now better rate controlled  Eliquis price checked- no copay  Cardiology following, see recommendations

## 2024-06-03 LAB
ANION GAP SERPL CALCULATED.3IONS-SCNC: 8 MMOL/L (ref 4–13)
APTT PPP: 114 SECONDS (ref 23–37)
APTT PPP: 86 SECONDS (ref 23–37)
BASOPHILS # BLD AUTO: 0.06 THOUSANDS/ÂΜL (ref 0–0.1)
BASOPHILS NFR BLD AUTO: 1 % (ref 0–1)
BUN SERPL-MCNC: 13 MG/DL (ref 5–25)
CALCIUM SERPL-MCNC: 9.2 MG/DL (ref 8.4–10.2)
CHLORIDE SERPL-SCNC: 103 MMOL/L (ref 96–108)
CO2 SERPL-SCNC: 31 MMOL/L (ref 21–32)
CREAT SERPL-MCNC: 0.85 MG/DL (ref 0.6–1.3)
EOSINOPHIL # BLD AUTO: 0.22 THOUSAND/ÂΜL (ref 0–0.61)
EOSINOPHIL NFR BLD AUTO: 3 % (ref 0–6)
ERYTHROCYTE [DISTWIDTH] IN BLOOD BY AUTOMATED COUNT: 13.9 % (ref 11.6–15.1)
GFR SERPL CREATININE-BSD FRML MDRD: 98 ML/MIN/1.73SQ M
GLUCOSE SERPL-MCNC: 86 MG/DL (ref 65–140)
HCT VFR BLD AUTO: 44.3 % (ref 36.5–49.3)
HGB BLD-MCNC: 14 G/DL (ref 12–17)
IMM GRANULOCYTES # BLD AUTO: 0.02 THOUSAND/UL (ref 0–0.2)
IMM GRANULOCYTES NFR BLD AUTO: 0 % (ref 0–2)
INR PPP: 1.15 (ref 0.84–1.19)
LYMPHOCYTES # BLD AUTO: 1.7 THOUSANDS/ÂΜL (ref 0.6–4.47)
LYMPHOCYTES NFR BLD AUTO: 25 % (ref 14–44)
MAGNESIUM SERPL-MCNC: 1.9 MG/DL (ref 1.9–2.7)
MCH RBC QN AUTO: 27.3 PG (ref 26.8–34.3)
MCHC RBC AUTO-ENTMCNC: 31.6 G/DL (ref 31.4–37.4)
MCV RBC AUTO: 87 FL (ref 82–98)
MONOCYTES # BLD AUTO: 0.8 THOUSAND/ÂΜL (ref 0.17–1.22)
MONOCYTES NFR BLD AUTO: 12 % (ref 4–12)
NEUTROPHILS # BLD AUTO: 3.89 THOUSANDS/ÂΜL (ref 1.85–7.62)
NEUTS SEG NFR BLD AUTO: 59 % (ref 43–75)
NRBC BLD AUTO-RTO: 0 /100 WBCS
PLATELET # BLD AUTO: 255 THOUSANDS/UL (ref 149–390)
PMV BLD AUTO: 10.5 FL (ref 8.9–12.7)
POTASSIUM SERPL-SCNC: 3.5 MMOL/L (ref 3.5–5.3)
PROTHROMBIN TIME: 15.4 SECONDS (ref 11.6–14.5)
RBC # BLD AUTO: 5.12 MILLION/UL (ref 3.88–5.62)
SODIUM SERPL-SCNC: 142 MMOL/L (ref 135–147)
WBC # BLD AUTO: 6.69 THOUSAND/UL (ref 4.31–10.16)

## 2024-06-03 PROCEDURE — 85730 THROMBOPLASTIN TIME PARTIAL: CPT | Performed by: STUDENT IN AN ORGANIZED HEALTH CARE EDUCATION/TRAINING PROGRAM

## 2024-06-03 PROCEDURE — 85610 PROTHROMBIN TIME: CPT | Performed by: NURSE PRACTITIONER

## 2024-06-03 PROCEDURE — 83735 ASSAY OF MAGNESIUM: CPT | Performed by: STUDENT IN AN ORGANIZED HEALTH CARE EDUCATION/TRAINING PROGRAM

## 2024-06-03 PROCEDURE — C1769 GUIDE WIRE: HCPCS | Performed by: INTERNAL MEDICINE

## 2024-06-03 PROCEDURE — 93454 CORONARY ARTERY ANGIO S&I: CPT | Performed by: INTERNAL MEDICINE

## 2024-06-03 PROCEDURE — 99152 MOD SED SAME PHYS/QHP 5/>YRS: CPT | Performed by: INTERNAL MEDICINE

## 2024-06-03 PROCEDURE — 99232 SBSQ HOSP IP/OBS MODERATE 35: CPT | Performed by: STUDENT IN AN ORGANIZED HEALTH CARE EDUCATION/TRAINING PROGRAM

## 2024-06-03 PROCEDURE — 4A023N7 MEASUREMENT OF CARDIAC SAMPLING AND PRESSURE, LEFT HEART, PERCUTANEOUS APPROACH: ICD-10-PCS | Performed by: INTERNAL MEDICINE

## 2024-06-03 PROCEDURE — B211YZZ FLUOROSCOPY OF MULTIPLE CORONARY ARTERIES USING OTHER CONTRAST: ICD-10-PCS | Performed by: INTERNAL MEDICINE

## 2024-06-03 PROCEDURE — 80048 BASIC METABOLIC PNL TOTAL CA: CPT | Performed by: STUDENT IN AN ORGANIZED HEALTH CARE EDUCATION/TRAINING PROGRAM

## 2024-06-03 PROCEDURE — 85025 COMPLETE CBC W/AUTO DIFF WBC: CPT | Performed by: STUDENT IN AN ORGANIZED HEALTH CARE EDUCATION/TRAINING PROGRAM

## 2024-06-03 PROCEDURE — C1894 INTRO/SHEATH, NON-LASER: HCPCS | Performed by: INTERNAL MEDICINE

## 2024-06-03 PROCEDURE — 99153 MOD SED SAME PHYS/QHP EA: CPT | Performed by: INTERNAL MEDICINE

## 2024-06-03 PROCEDURE — 5A2204Z RESTORATION OF CARDIAC RHYTHM, SINGLE: ICD-10-PCS | Performed by: INTERNAL MEDICINE

## 2024-06-03 RX ORDER — LIDOCAINE WITH 8.4% SOD BICARB 0.9%(10ML)
SYRINGE (ML) INJECTION CODE/TRAUMA/SEDATION MEDICATION
Status: DISCONTINUED | OUTPATIENT
Start: 2024-06-03 | End: 2024-06-03 | Stop reason: HOSPADM

## 2024-06-03 RX ORDER — MIDAZOLAM HYDROCHLORIDE 2 MG/2ML
INJECTION, SOLUTION INTRAMUSCULAR; INTRAVENOUS CODE/TRAUMA/SEDATION MEDICATION
Status: DISCONTINUED | OUTPATIENT
Start: 2024-06-03 | End: 2024-06-03 | Stop reason: HOSPADM

## 2024-06-03 RX ORDER — FENTANYL CITRATE 50 UG/ML
INJECTION, SOLUTION INTRAMUSCULAR; INTRAVENOUS CODE/TRAUMA/SEDATION MEDICATION
Status: DISCONTINUED | OUTPATIENT
Start: 2024-06-03 | End: 2024-06-03 | Stop reason: HOSPADM

## 2024-06-03 RX ORDER — HEPARIN SODIUM 1000 [USP'U]/ML
INJECTION, SOLUTION INTRAVENOUS; SUBCUTANEOUS CODE/TRAUMA/SEDATION MEDICATION
Status: DISCONTINUED | OUTPATIENT
Start: 2024-06-03 | End: 2024-06-03 | Stop reason: HOSPADM

## 2024-06-03 RX ORDER — SODIUM CHLORIDE 9 MG/ML
75 INJECTION, SOLUTION INTRAVENOUS CONTINUOUS
Status: DISPENSED | OUTPATIENT
Start: 2024-06-03 | End: 2024-06-03

## 2024-06-03 RX ORDER — VERAPAMIL HYDROCHLORIDE 2.5 MG/ML
INJECTION, SOLUTION INTRAVENOUS CODE/TRAUMA/SEDATION MEDICATION
Status: DISCONTINUED | OUTPATIENT
Start: 2024-06-03 | End: 2024-06-03 | Stop reason: HOSPADM

## 2024-06-03 RX ORDER — NITROGLYCERIN 20 MG/100ML
INJECTION INTRAVENOUS CODE/TRAUMA/SEDATION MEDICATION
Status: DISCONTINUED | OUTPATIENT
Start: 2024-06-03 | End: 2024-06-03 | Stop reason: HOSPADM

## 2024-06-03 RX ADMIN — FUROSEMIDE 40 MG: 10 INJECTION, SOLUTION INTRAMUSCULAR; INTRAVENOUS at 08:28

## 2024-06-03 RX ADMIN — HEPARIN SODIUM 15 UNITS/KG/HR: 10000 INJECTION, SOLUTION INTRAVENOUS at 08:32

## 2024-06-03 RX ADMIN — SODIUM CHLORIDE 75 ML/HR: 0.9 INJECTION, SOLUTION INTRAVENOUS at 15:26

## 2024-06-03 RX ADMIN — LISINOPRIL 10 MG: 10 TABLET ORAL at 08:27

## 2024-06-03 RX ADMIN — METOPROLOL SUCCINATE 100 MG: 100 TABLET, EXTENDED RELEASE ORAL at 08:27

## 2024-06-03 NOTE — CASE MANAGEMENT
Case Management Progress Note    Patient name Antonio Mckeon  Location 2 EAST 283/2 E 283-01 MRN [id number]  : 1970 Date 6/3/2024       LOS (days): 5  Geometric Mean LOS (GMLOS) (days):   Days to GMLOS:        OBJECTIVE:    Current admission status: Inpatient  Preferred Pharmacy:   [id number] - [place] - [address] [address]  Phone: [phone] Fax: [phone]    [id number] - [place] - [address]  [address] [facility] [address]  Phone: [phone] Fax: [phone]    Primary Care Provider: Kayla Fall PA-C  Primary Insurance: Infomous  Secondary Insurance:     PROGRESS NOTE:  Patient reviewed during Interdisciplinary Rounds with SLIM today.  Anticipated for d/c within 24hrs.  AMPAC is 24.  Patient has no anticipated CM needs.  CM will continue to follow.

## 2024-06-03 NOTE — PROGRESS NOTES
Formerly [facility]  Progress Note  Name: Antonio Mckeon I  MRN: [id number]  Unit/Bed#: 2 E 283-01 I Date of Admission: 5/29/2024   Date of Service: 6/3/2024  Hospital Day: 5    Assessment & Plan   * Cardiomyopathy (HCC)  Assessment & Plan  Secondary to new onset afib resulting in sub optimal cardiac output  Unclear if ischemic or nonischemic, pending cardiac cath  BNP never collected   Started on IV lasix, net otput 3.6 liters   ECHO with reduced EF, needs cardiac cath after further diuresis   Cardiology following  Plan for cardiac cath today     New onset atrial fibrillation with RVR (HCC)  Assessment & Plan  New onset afib with RVR   Status post SPENCER and cardioversion, now better rate controlled  Eliquis price checked- no copay  Cardiology following, see recommendations    Primary hypertension  Assessment & Plan  Better controlled  Monitor                VTE Pharmacologic Prophylaxis: VTE Score: 3 Moderate Risk (Score 3-4) - Pharmacological DVT Prophylaxis Ordered: heparin drip.    Mobility:   Basic Mobility Inpatient Raw Score: 24  JH-HLM Goal: 8: Walk 250 feet or more  JH-HLM Achieved: 7: Walk 25 feet or more  JH-HLM Goal achieved. Continue to encourage appropriate mobility.    Patient Centered Rounds: I performed bedside rounds with nursing staff today.   Discussions with Specialists or Other Care Team Provider: cardiology     Education and Discussions with Family / Patient: Updated  (wife) at bedside.    Total Time Spent on Date of Encounter in care of patient: 30+ mins. This time was spent on one or more of the following: performing physical exam; counseling and coordination of care; obtaining or reviewing history; documenting in the medical record; reviewing/ordering tests, medications or procedures; communicating with other healthcare professionals and discussing with patient's family/caregivers.    Current Length of Stay: 5 day(s)  Current Patient Status: Inpatient    Certification Statement: The patient will continue to require additional inpatient hospital stay due to cardiac cath   Discharge Plan: Anticipate discharge later today or tomorrow to home.    Code Status: Level 1 - Full Code    Subjective:   Patient feels well over all     Objective:     Vitals:   Temp (24hrs), Av.2 °F (36.8 °C), Min:98.2 °F (36.8 °C), Max:98.2 °F (36.8 °C)    Temp:  [98.2 °F (36.8 °C)] 98.2 °F (36.8 °C)  HR:  [] 47  Resp:  [18-20] 19  BP: (141-161)/() 158/95  SpO2:  [96 %-100 %] 97 %  Body mass index is 37.21 kg/m².     Input and Output Summary (last 24 hours):     Intake/Output Summary (Last 24 hours) at 6/3/2024 1419  Last data filed at 6/3/2024 0900  Gross per 24 hour   Intake 240 ml   Output --   Net 240 ml       Physical Exam:   Physical Exam  Constitutional:       General: He is not in acute distress.     Appearance: Normal appearance. He is not toxic-appearing.   Cardiovascular:      Rate and Rhythm: Normal rate and regular rhythm.      Heart sounds: Normal heart sounds. No murmur heard.  Pulmonary:      Effort: Pulmonary effort is normal. No respiratory distress.      Breath sounds: Normal breath sounds. No wheezing.   Abdominal:      General: Abdomen is flat. There is no distension.      Palpations: Abdomen is soft.      Tenderness: There is no abdominal tenderness.   Neurological:      General: No focal deficit present.      Mental Status: He is alert and oriented to person, place, and time. Mental status is at baseline.      Motor: No weakness.          Additional Data:     Labs:  Results from last 7 days   Lab Units 24  0440   WBC Thousand/uL 6.69   HEMOGLOBIN g/dL 14.0   HEMATOCRIT % 44.3   PLATELETS Thousands/uL 255   SEGS PCT % 59   LYMPHO PCT % 25   MONO PCT % 12   EOS PCT % 3     Results from last 7 days   Lab Units 24  0440 24  0541 24   SODIUM mmol/L 142   < > 140   POTASSIUM mmol/L 3.5   < > 3.7   CHLORIDE mmol/L 103   < > 105    CO2 mmol/L 31   < > 25   BUN mg/dL 13   < > 14   CREATININE mg/dL 0.85   < > 0.96   ANION GAP mmol/L 8   < > 10   CALCIUM mg/dL 9.2   < > 9.6   ALBUMIN g/dL  --   --  4.4   TOTAL BILIRUBIN mg/dL  --   --  1.51*   ALK PHOS U/L  --   --  93   ALT U/L  --   --  35   AST U/L  --   --  29   GLUCOSE RANDOM mg/dL 86   < > 114    < > = values in this interval not displayed.     Results from last 7 days   Lab Units 06/03/24  0440   INR  1.15                   Lines/Drains:  Invasive Devices       Peripheral Intravenous Line  Duration             Peripheral IV 05/31/24 Right;Ventral (anterior) Hand 3 days    Peripheral IV 06/02/24 Proximal;Right;Ventral (anterior) Forearm <1 day                      Telemetry:  Telemetry Orders (From admission, onward)               24 Hour Telemetry Monitoring  Continuous x 24 Hours (Telem)        Question:  Reason for 24 Hour Telemetry  Answer:  Arrhythmias requiring acute medical intervention / PPM or ICD malfunction                                Imaging: No pertinent imaging reviewed.    Recent Cultures (last 7 days):         Last 24 Hours Medication List:   Current Facility-Administered Medications   Medication Dose Route Frequency Provider Last Rate    acetaminophen  650 mg Oral Q4H PRN Charlee Arreaga PA-C      furosemide  40 mg Intravenous BID Charlee Arreaga PA-C      heparin (porcine)  3-30 Units/kg/hr (Order-Specific) Intravenous Titrated LORELEI Smith 15 Units/kg/hr (06/03/24 0832)    lisinopril  10 mg Oral Daily Charlee Arreaga PA-C      metoprolol succinate  100 mg Oral Daily LORELEI Smith          Today, Patient Was Seen By: Oliver Coles MD    **Please Note: This note may have been constructed using a voice recognition system.**

## 2024-06-03 NOTE — PLAN OF CARE
Problem: PAIN - ADULT  Goal: Verbalizes/displays adequate comfort level or baseline comfort level  Description: Interventions:  - Encourage patient to monitor pain and request assistance  - Assess pain using appropriate pain scale  - Administer analgesics based on type and severity of pain and evaluate response  - Implement non-pharmacological measures as appropriate and evaluate response  - Consider cultural and social influences on pain and pain management  - Notify physician/advanced practitioner if interventions unsuccessful or patient reports new pain  Outcome: Progressing     Problem: INFECTION - ADULT  Goal: Absence or prevention of progression during hospitalization  Description: INTERVENTIONS:  - Assess and monitor for signs and symptoms of infection  - Monitor lab/diagnostic results  - Monitor all insertion sites, i.e. indwelling lines, tubes, and drains  - Monitor endotracheal if appropriate and nasal secretions for changes in amount and color  - [place] appropriate cooling/warming therapies per order  - Administer medications as ordered  - Instruct and encourage patient and family to use good hand hygiene technique  - Identify and instruct in appropriate isolation precautions for identified infection/condition  Outcome: Progressing     Problem: SAFETY ADULT  Goal: Patient will remain free of falls  Description: INTERVENTIONS:  - Educate patient/family on patient safety including physical limitations  - Instruct patient to call for assistance with activity   - Consult OT/PT to assist with strengthening/mobility   - Keep Call bell within reach  - Keep bed low and locked with side rails adjusted as appropriate  - Keep care items and personal belongings within reach  - Initiate and maintain comfort rounds  - Make Fall Risk Sign visible to staff  - Offer Toileting every 2 Hours, in advance of need  - Initiate/Maintain alarm  - Obtain necessary fall risk management equipment:   - Apply yellow socks and  bracelet for high fall risk patients  - Consider moving patient to room near nurses station  Outcome: Progressing  Goal: Maintain or return to baseline ADL function  Description: INTERVENTIONS:  -  Assess patient's ability to carry out ADLs; assess patient's baseline for ADL function and identify physical deficits which impact ability to perform ADLs (bathing, care of mouth/teeth, toileting, grooming, dressing, etc.)  - Assess/evaluate cause of self-care deficits   - Assess range of motion  - Assess patient's mobility; develop plan if impaired  - Assess patient's need for assistive devices and provide as appropriate  - Encourage maximum independence but intervene and supervise when necessary  - Involve family in performance of ADLs  - Assess for home care needs following discharge   - Consider OT consult to assist with ADL evaluation and planning for discharge  - Provide patient education as appropriate  Outcome: Progressing  Goal: Maintains/Returns to pre admission functional level  Description: INTERVENTIONS:  - Perform AM-PAC 6 Click Basic Mobility/ Daily Activity assessment daily.  - Set and communicate daily mobility goal to care team and patient/family/caregiver.   - Collaborate with rehabilitation services on mobility goals if consulted  - Perform Range of Motion 3 times a day.  - Reposition patient every 3 hours.  - Dangle patient 3 times a day  - Stand patient 3 times a day  - Ambulate patient 3 times a day  - Out of bed to chair 3 times a day   - Out of bed for meals 3 times a day  - Out of bed for toileting  - Record patient progress and toleration of activity level   Outcome: Progressing     Problem: DISCHARGE PLANNING  Goal: Discharge to home or other facility with appropriate resources  Description: INTERVENTIONS:  - Identify barriers to discharge w/patient and caregiver  - Arrange for needed discharge resources and transportation as appropriate  - Identify discharge learning needs (meds, wound care,  etc.)  - Arrange for interpretive services to assist at discharge as needed  - Refer to Case Management Department for coordinating discharge planning if the patient needs post-hospital services based on physician/advanced practitioner order or complex needs related to functional status, cognitive ability, or social support system  Outcome: Progressing     Problem: Knowledge Deficit  Goal: Patient/family/caregiver demonstrates understanding of disease process, treatment plan, medications, and discharge instructions  Description: Complete learning assessment and assess knowledge base.  Interventions:  - Provide teaching at level of understanding  - Provide teaching via preferred learning methods  Outcome: Progressing     Problem: Nutrition/Hydration-ADULT  Goal: Nutrient/Hydration intake appropriate for improving, restoring or maintaining nutritional needs  Description: Monitor and assess patient's nutrition/hydration status for malnutrition. Collaborate with interdisciplinary team and initiate plan and interventions as ordered.  Monitor patient's weight and dietary intake as ordered or per policy. Utilize nutrition screening tool and intervene as necessary. Determine patient's food preferences and provide high-protein, high-caloric foods as appropriate.     INTERVENTIONS:  - Monitor oral intake, urinary output, labs, and treatment plans  - Assess nutrition and hydration status and recommend course of action  - Evaluate amount of meals eaten  - Assist patient with eating if necessary   - Allow adequate time for meals  - Recommend/ encourage appropriate diets, oral nutritional supplements, and vitamin/mineral supplements  - Order, calculate, and assess calorie counts as needed  - Recommend, monitor, and adjust tube feedings and TPN/PPN based on assessed needs  - Assess need for intravenous fluids  - Provide specific nutrition/hydration education as appropriate  - Include patient/family/caregiver in decisions related  to nutrition  Outcome: Progressing

## 2024-06-03 NOTE — PLAN OF CARE
Problem: PAIN - ADULT  Goal: Verbalizes/displays adequate comfort level or baseline comfort level  Description: Interventions:  - Encourage patient to monitor pain and request assistance  - Assess pain using appropriate pain scale  - Administer analgesics based on type and severity of pain and evaluate response  - Implement non-pharmacological measures as appropriate and evaluate response  - Consider cultural and social influences on pain and pain management  - Notify physician/advanced practitioner if interventions unsuccessful or patient reports new pain  Outcome: Progressing     Problem: INFECTION - ADULT  Goal: Absence or prevention of progression during hospitalization  Description: INTERVENTIONS:  - Assess and monitor for signs and symptoms of infection  - Monitor lab/diagnostic results  - Monitor all insertion sites, i.e. indwelling lines, tubes, and drains  - Monitor endotracheal if appropriate and nasal secretions for changes in amount and color  - [place] appropriate cooling/warming therapies per order  - Administer medications as ordered  - Instruct and encourage patient and family to use good hand hygiene technique  - Identify and instruct in appropriate isolation precautions for identified infection/condition  Outcome: Progressing     Problem: SAFETY ADULT  Goal: Patient will remain free of falls  Description: INTERVENTIONS:  - Educate patient/family on patient safety including physical limitations  - Instruct patient to call for assistance with activity   - Consult OT/PT to assist with strengthening/mobility   - Keep Call bell within reach  - Keep bed low and locked with side rails adjusted as appropriate  - Keep care items and personal belongings within reach  - Initiate and maintain comfort rounds  - Make Fall Risk Sign visible to staff  - Offer Toileting every 2 Hours, in advance of need  - Obtain necessary fall risk management equipment: nonskid socks  - Apply yellow socks and bracelet for high  fall risk patients  - Consider moving patient to room near nurses station  Outcome: Progressing  Goal: Maintain or return to baseline ADL function  Description: INTERVENTIONS:  -  Assess patient's ability to carry out ADLs; assess patient's baseline for ADL function and identify physical deficits which impact ability to perform ADLs (bathing, care of mouth/teeth, toileting, grooming, dressing, etc.)  - Assess/evaluate cause of self-care deficits   - Assess range of motion  - Assess patient's mobility; develop plan if impaired  - Assess patient's need for assistive devices and provide as appropriate  - Encourage maximum independence but intervene and supervise when necessary  - Involve family in performance of ADLs  - Assess for home care needs following discharge   - Consider OT consult to assist with ADL evaluation and planning for discharge  - Provide patient education as appropriate  Outcome: Progressing  Goal: Maintains/Returns to pre admission functional level  Description: INTERVENTIONS:  - Perform AM-PAC 6 Click Basic Mobility/ Daily Activity assessment daily.  - Set and communicate daily mobility goal to care team and patient/family/caregiver.   - Collaborate with rehabilitation services on mobility goals if consulted  - Perform Range of Motion 4 times a day.  - Reposition patient every 2 hours.  - Dangle patient 4 times a day  - Stand patient 4 times a day  - Ambulate patient 4 times a day  - Out of bed to chair 4 times a day   - Out of bed for meals 4 times a day  - Out of bed for toileting  - Record patient progress and toleration of activity level   Outcome: Progressing     Problem: DISCHARGE PLANNING  Goal: Discharge to home or other facility with appropriate resources  Description: INTERVENTIONS:  - Identify barriers to discharge w/patient and caregiver  - Arrange for needed discharge resources and transportation as appropriate  - Identify discharge learning needs (meds, wound care, etc.)  - Arrange  for interpretive services to assist at discharge as needed  - Refer to Case Management Department for coordinating discharge planning if the patient needs post-hospital services based on physician/advanced practitioner order or complex needs related to functional status, cognitive ability, or social support system  Outcome: Progressing     Problem: Knowledge Deficit  Goal: Patient/family/caregiver demonstrates understanding of disease process, treatment plan, medications, and discharge instructions  Description: Complete learning assessment and assess knowledge base.  Interventions:  - Provide teaching at level of understanding  - Provide teaching via preferred learning methods  Outcome: Progressing     Problem: Nutrition/Hydration-ADULT  Goal: Nutrient/Hydration intake appropriate for improving, restoring or maintaining nutritional needs  Description: Monitor and assess patient's nutrition/hydration status for malnutrition. Collaborate with interdisciplinary team and initiate plan and interventions as ordered.  Monitor patient's weight and dietary intake as ordered or per policy. Utilize nutrition screening tool and intervene as necessary. Determine patient's food preferences and provide high-protein, high-caloric foods as appropriate.     INTERVENTIONS:  - Monitor oral intake, urinary output, labs, and treatment plans  - Assess nutrition and hydration status and recommend course of action  - Evaluate amount of meals eaten  - Assist patient with eating if necessary   - Allow adequate time for meals  - Recommend/ encourage appropriate diets, oral nutritional supplements, and vitamin/mineral supplements  - Order, calculate, and assess calorie counts as needed  - Recommend, monitor, and adjust tube feedings and TPN/PPN based on assessed needs  - Assess need for intravenous fluids  - Provide specific nutrition/hydration education as appropriate  - Include patient/family/caregiver in decisions related to  nutrition  Outcome: Progressing

## 2024-06-03 NOTE — ASSESSMENT & PLAN NOTE
Secondary to new onset afib resulting in sub optimal cardiac output  Unclear if ischemic or nonischemic, pending cardiac cath  BNP never collected   Started on IV lasix, net otput 3.6 liters   ECHO with reduced EF, needs cardiac cath after further diuresis   Cardiology following  Plan for cardiac cath today

## 2024-06-04 ENCOUNTER — TELEPHONE (OUTPATIENT)
Dept: CARDIOLOGY CLINIC | Facility: CLINIC | Age: 54
End: 2024-06-04

## 2024-06-04 VITALS
DIASTOLIC BLOOD PRESSURE: 94 MMHG | RESPIRATION RATE: 18 BRPM | TEMPERATURE: 98 F | OXYGEN SATURATION: 96 % | BODY MASS INDEX: 35.25 KG/M2 | SYSTOLIC BLOOD PRESSURE: 131 MMHG | WEIGHT: 251.8 LBS | HEIGHT: 71 IN | HEART RATE: 69 BPM

## 2024-06-04 LAB
ANION GAP SERPL CALCULATED.3IONS-SCNC: 8 MMOL/L (ref 4–13)
APTT PPP: 62 SECONDS (ref 23–37)
BASOPHILS # BLD AUTO: 0.06 THOUSANDS/ÂΜL (ref 0–0.1)
BASOPHILS NFR BLD AUTO: 1 % (ref 0–1)
BUN SERPL-MCNC: 16 MG/DL (ref 5–25)
CALCIUM SERPL-MCNC: 9.1 MG/DL (ref 8.4–10.2)
CHLORIDE SERPL-SCNC: 102 MMOL/L (ref 96–108)
CO2 SERPL-SCNC: 31 MMOL/L (ref 21–32)
CREAT SERPL-MCNC: 1.01 MG/DL (ref 0.6–1.3)
EOSINOPHIL # BLD AUTO: 0.24 THOUSAND/ÂΜL (ref 0–0.61)
EOSINOPHIL NFR BLD AUTO: 4 % (ref 0–6)
ERYTHROCYTE [DISTWIDTH] IN BLOOD BY AUTOMATED COUNT: 13.9 % (ref 11.6–15.1)
GFR SERPL CREATININE-BSD FRML MDRD: 83 ML/MIN/1.73SQ M
GLUCOSE SERPL-MCNC: 87 MG/DL (ref 65–140)
HCT VFR BLD AUTO: 44.5 % (ref 36.5–49.3)
HGB BLD-MCNC: 14 G/DL (ref 12–17)
IMM GRANULOCYTES # BLD AUTO: 0.02 THOUSAND/UL (ref 0–0.2)
IMM GRANULOCYTES NFR BLD AUTO: 0 % (ref 0–2)
LYMPHOCYTES # BLD AUTO: 1.6 THOUSANDS/ÂΜL (ref 0.6–4.47)
LYMPHOCYTES NFR BLD AUTO: 27 % (ref 14–44)
MAGNESIUM SERPL-MCNC: 2 MG/DL (ref 1.9–2.7)
MCH RBC QN AUTO: 27.3 PG (ref 26.8–34.3)
MCHC RBC AUTO-ENTMCNC: 31.5 G/DL (ref 31.4–37.4)
MCV RBC AUTO: 87 FL (ref 82–98)
MONOCYTES # BLD AUTO: 0.66 THOUSAND/ÂΜL (ref 0.17–1.22)
MONOCYTES NFR BLD AUTO: 11 % (ref 4–12)
NEUTROPHILS # BLD AUTO: 3.46 THOUSANDS/ÂΜL (ref 1.85–7.62)
NEUTS SEG NFR BLD AUTO: 57 % (ref 43–75)
NRBC BLD AUTO-RTO: 0 /100 WBCS
PLATELET # BLD AUTO: 253 THOUSANDS/UL (ref 149–390)
PMV BLD AUTO: 10.3 FL (ref 8.9–12.7)
POTASSIUM SERPL-SCNC: 3.7 MMOL/L (ref 3.5–5.3)
RBC # BLD AUTO: 5.13 MILLION/UL (ref 3.88–5.62)
SODIUM SERPL-SCNC: 141 MMOL/L (ref 135–147)
WBC # BLD AUTO: 6.04 THOUSAND/UL (ref 4.31–10.16)

## 2024-06-04 PROCEDURE — 85730 THROMBOPLASTIN TIME PARTIAL: CPT | Performed by: STUDENT IN AN ORGANIZED HEALTH CARE EDUCATION/TRAINING PROGRAM

## 2024-06-04 PROCEDURE — 83735 ASSAY OF MAGNESIUM: CPT

## 2024-06-04 PROCEDURE — 85025 COMPLETE CBC W/AUTO DIFF WBC: CPT

## 2024-06-04 PROCEDURE — 99239 HOSP IP/OBS DSCHRG MGMT >30: CPT | Performed by: INTERNAL MEDICINE

## 2024-06-04 PROCEDURE — 80048 BASIC METABOLIC PNL TOTAL CA: CPT

## 2024-06-04 PROCEDURE — 99232 SBSQ HOSP IP/OBS MODERATE 35: CPT | Performed by: INTERNAL MEDICINE

## 2024-06-04 RX ORDER — FUROSEMIDE 20 MG/1
20 TABLET ORAL DAILY PRN
Status: DISCONTINUED | OUTPATIENT
Start: 2024-06-04 | End: 2024-06-04 | Stop reason: HOSPADM

## 2024-06-04 RX ORDER — METOPROLOL SUCCINATE 100 MG/1
100 TABLET, EXTENDED RELEASE ORAL DAILY
Qty: 30 TABLET | Refills: 0 | Status: SHIPPED | OUTPATIENT
Start: 2024-06-05 | End: 2024-07-05

## 2024-06-04 RX ORDER — FUROSEMIDE 20 MG/1
20 TABLET ORAL DAILY PRN
Qty: 30 TABLET | Refills: 0 | Status: SHIPPED | OUTPATIENT
Start: 2024-06-04 | End: 2024-07-04

## 2024-06-04 RX ADMIN — LISINOPRIL 10 MG: 10 TABLET ORAL at 08:26

## 2024-06-04 RX ADMIN — HEPARIN SODIUM 15 UNITS/KG/HR: 10000 INJECTION, SOLUTION INTRAVENOUS at 00:30

## 2024-06-04 RX ADMIN — APIXABAN 5 MG: 5 TABLET, FILM COATED ORAL at 10:37

## 2024-06-04 RX ADMIN — METOPROLOL SUCCINATE 100 MG: 100 TABLET, EXTENDED RELEASE ORAL at 08:26

## 2024-06-04 NOTE — DISCHARGE SUMMARY
Admitting Provider:  Pritesh Fine MD  Discharge Provider:  Oscar Monteengro MD  Admission Date: 5/29/2024       Discharge Date: 06/04/24   LOS: 6  Primary Care Physician at Discharge: Kayla Fall PA-C [phone]    HOSPITAL COURSE:  Antonio Mckeon is a 53 y.o. male who presented with new onset cardiomyopathy and atrial fibrillation.  He had lower extremity edema for a week.  Patient did have ejection fraction which showed EF of 40%.  Cardiology was consulted.  Cardiac catheterization done was clean.  Patient is being discharged on Eliquis for anticoagulation and metoprolol along with Lasix.  Patient told to follow-up outpatient cardiology.  Patient told to go to  cardiology in [place] for which cardiology already put in a consult as outpatient.  Importance of diet and medication compliance advised    Mobility:   Basic Mobility Inpatient Raw Score: 24  JH-HLM Goal: 8: Walk 250 feet or more  JH-HLM Achieved: 7: Walk 25 feet or more  JH-HLM Goal achieved. Continue to encourage appropriate mobility.    REASON FOR ADMISSION/ ADMISSION DIAGNOSES    New onset acute heart failure with reduced ejection fraction, atrial fibrillation with rapid ventricular rate    DISCHARGE DIAGNOSES    New onset acute HFrEF  Appears euvolemic on exam  Medication Regiment Includes:   Diuretic: Lasix 20 mg daily as needed  GDMT: as noted below  Recommend low-sodium diet, daily weights, strict I's and O's.  Recommend continuing to monitor and replete electrolytes as needed.     2.  New onset NICM-EF 40%  Etiology: Likely tachycardia mediated in the setting of A-fib with RVR  Appears euvolemic on exam  Medication Regiment Includes:   Diuretic: Lasix 20 mg daily as needed  GDMT:   Beta Blocker: Continue Toprol 100 mg daily  ACEI/ARB/ARNI: Continue lisinopril 10 mg daily  Patient is eager to be discharged from the hospital. Recommend optimization of GDMT in the outpatient setting.     3.  Newly diagnosed atrial fibrillation with  RVR  He is s/p SPENCER/DCCV 5/31 with successful conversion to sinus rhythm, subsequently converted back to atrial fibrillation on 6/2.  Patient remains in atrial fibrillation today with adequate rate control   Continue anticoagulation with Eliquis.  [id number] 2  Continue rate control with Toprol 100 mg daily.  Ambulatory referral to EP has been placed by cardiology for evaluation for A-fib ablation.     4.  Hypertension  Continue lisinopril 10 mg daily, Toprol 100 mg daily  CONSULTING PROVIDERS   IP CONSULT TO NUTRITION SERVICES  IP CONSULT TO CARDIOLOGY  IP CONSULT TO NUTRITION SERVICES  IP CONSULT TO NUTRITION SERVICES    PROCEDURES PERFORMED  Procedure(s) (LRB):  Cardiac catheterization (Left)  Cardiac Coronary Angiogram (N/A)    RADIOLOGY RESULTS  XR chest 1 view portable    Result Date: 5/30/2024  Impression: Mild central vascular congestion may in part be related to poor inspiration. However, findings suspicious for mild pulmonary edema. There is a moderately large left-sided pleural effusion identified. Workstation performed: [id number]       LABS  Results from last 7 days   Lab Units 06/04/24  0501 06/03/24  0440 06/02/24  0134 06/01/24  1328 05/30/24  0748 05/30/24  0044 05/29/24 2022   WBC Thousand/uL 6.04 6.69 6.79 8.23 8.98  --  10.00   HEMOGLOBIN g/dL 14.0 14.0 13.0 13.3 13.1  --  14.6   HEMATOCRIT % 44.5 44.3 39.9 40.8 40.1  --  46.6   MCV fL 87 87 87 86 86  --  88   PLATELETS Thousands/uL 253 255 241 304 289  --  320   INR   --  1.15  --  1.37*  --  1.22*  --      Results from last 7 days   Lab Units 06/04/24  0501 06/03/24  0440 06/02/24  0134 05/31/24  0541 05/29/24 2022   SODIUM mmol/L 141 142 142 141 140   POTASSIUM mmol/L 3.7 3.5 3.6 3.9 3.7   CHLORIDE mmol/L 102 103 103 106 105   CO2 mmol/L 31 31 31 27 25   BUN mg/dL 16 13 17 18 14   CREATININE mg/dL 1.01 0.85 0.99 1.05 0.96   CALCIUM mg/dL 9.1 9.2 8.9 8.9 9.6   ALBUMIN g/dL  --   --   --   --  4.4   TOTAL BILIRUBIN mg/dL  --   --   --   --  " 1.51*   ALK PHOS U/L  --   --   --   --  93   ALT U/L  --   --   --   --  35   AST U/L  --   --   --   --  29   EGFR ml/min/1.73sq m 83 98 85 79 88   GLUCOSE RANDOM mg/dL 87 86 97 86 114                          Results from last 7 days   Lab Units 05/29/24  2022   TSH 3RD GENERATON uIU/mL 2.844               Cultures:                   PHYSICAL EXAM:  Vitals:   Blood Pressure: 131/94 (06/04/24 0714)  Pulse: 69 (06/04/24 0714)  Temperature: 98 °F (36.7 °C) (06/04/24 0714)  Temp Source: Temporal (05/31/24 0928)  Respirations: 18 (06/04/24 0714)  Height: 5' 11\" (180.3 cm) (05/31/24 0810)  Weight - Scale: 114 kg (251 lb 12.8 oz) (06/04/24 0600)  SpO2: 96 % (06/04/24 0714)    General appearance: alert, appears stated age, and cooperative  HEENT - atraumatic and normocephalic  Neck- supple  Skin - no fresh rash  Extremities no fresh focal deformities  Cardiovascular- S1-S2 heard  Respiratory- bilateral air entry present, no crackles or rhonchi  Skin - no fresh rash  Abdomen - normal bowel sounds present, no rebound tenderness  CNS- No fresh focal deficits  Psych- no acute psychosis     Planned Re-admission: No  Discharge Disposition: Home/Self Care    Test Results Pending at Discharge:   Incidental findings: None    Medications   Summary of Medication Adjustments made as a result of this hospitalization: See attached med list  Medication Dosing Tapers - Please refer to Discharge Medication List for details on any medication dosing tapers (if applicable to patient).  Discharge Medication List: See after visit summary for reconciled discharge medications.     Diet restrictions: Cardiac diet       Diet Orders   (From admission, onward)                 Start     Ordered    06/03/24 1519  Diet Cardiovascular; Cardiac  Diet effective now        References:    Adult Nutrition Support Algorithm    RD Therapeutic Diet Order Protocol   Question Answer Comment   Diet Type Cardiovascular    Cardiac Cardiac    RD to adjust diet per " protocol? Yes        06/03/24 1519                  Activity restrictions: No strenuous activity  Discharge Condition: fair    Outpatient Follow-Up and Discharge Instructions  See after visit summary section titled Discharge Instructions for information provided to patient and family.      Code Status: Level 1 - Full Code  Discharge Statement   I spent 35 minutes discharging the patient. This time was spent on the day of discharge. Greater than 50% of total time was spent with the patient and / or family counseling and / or coordination of care.    Oscar Montenegro MD  [facility] Internal Medicine    ** Please Note: This note has been constructed using a voice recognition system. **        Medical Problems       Resolved Problems  Date Reviewed: 5/31/2024   None         Discharging Physician / Practitioner: No att. providers found  PCP: Kayla Fall PA-C  Admission Date:   Admission Orders (From admission, onward)       Ordered        05/29/24 2345  INPATIENT ADMISSION  Once            05/29/24 2221  INPATIENT ADMISSION  Once,   Status:  Canceled                          Discharge Date: 06/04/24    Consultations During Hospital Stay:  IP CONSULT TO NUTRITION SERVICES  IP CONSULT TO CARDIOLOGY  IP CONSULT TO NUTRITION SERVICES  IP CONSULT TO NUTRITION SERVICES    Procedures Performed:   SPENCER with cardioversion  Cardiac catheterization     Significant Findings / Test Results:   XR chest 1 view portable  Result Date: 5/30/2024  Impression: Mild central vascular congestion may in part be related to poor inspiration. However, findings suspicious for mild pulmonary edema. There is a moderately large left-sided pleural effusion identified.   Workstation performed: [id number]     Echo complete w/ contrast if indicated (5/31/2024)    Interpretation Summary    Left Ventricle: Left ventricular cavity size is normal. Wall thickness is normal. The left ventricular ejection fraction is 40%. Systolic function is  "moderately reduced. There is moderate global hypokinesis. Diastolic function is moderately abnormal, consistent with grade II (pseudonormal) relaxation.    Right Ventricle: Right ventricular cavity size is mildly dilated. Systolic function is normal.    Left Atrium: The atrium is mildly dilated.    Right Atrium: The atrium is mildly dilated.    Mitral Valve: There is mild regurgitation.    Cardiac Catheterization (6/3/2024)  No significant obstructive coronary artery disease     Incidental Findings:   None other than noted above;I reviewed the above mentioned incidental findings with the patient and/or family and they expressed understanding.    Complications: Patient converted to NSR via cardioversion with subsequent return to atrial fibrillation      Reason for Admission:   Chief Complaint   Patient presents with    Leg Swelling     Leg swelling x couple of days, + 2 edema, + pulses. Phx of afib for which he \"never followed up with\" takes no  BT, denies hx CHF. Patient  says he knows he has afib but was never diagnosed/never followed up     Mentions that him pcp sent him to ER  after he was seen for a panic attack  earlier today         Hospital Course:   Antonio Mckeon is a 53 y.o. male patient who originally presented to the hospital on 5/29/2024 from PCP office due to bilateral lower extremity and new onset a fib with RVR. In the ED, patient's HR noted to be 167 which improved to 120-130s after 2 boluses of cardizem. He was subsequently started on an IV heparin, cardizem and metoprolol. He was also diuresed with IV Lasix for volume overload.     Patient underwent echocardiography which showed new onset systolic HF with a LVEF of 40%. He underwent cardioversion with successful conversion to NSR on 5/31. However, patient went back into a fib on 6/2. He was anticoagulated with heparin gtt prior to cardiac catheterization on 6/3 which ruled out ischemic cardiomyopathy as no significant CAD was found. " "Therefore, his new onset cardiomyopathy and systolic HF is likely tachycardia-induced.     Patient switched from heparin gtt to Eliquis for anticoagulation on discharge. Other medications on discharge include metoprolol 100 mg once daily, lisinopril 10 mg once daily and Lasix 20 mg prn. CHF diet and education provided. Patient provided outpatient referral to EP for A-fib ablation evaluation.     Please see above list of diagnoses and related plan for additional information.     Condition at Discharge: good    Discharge Day Visit / Exam:   Subjective:  Patient states feeling back to his baseline status. He reports significant improvement in his swelling. He last experienced palpitations two nights prior. He denies any chest pain or SOB.     Vitals: Blood Pressure: 131/94 (06/04/24 0714)  Pulse: 69 (06/04/24 0714)  Temperature: 98 °F (36.7 °C) (06/04/24 0714)  Temp Source: Temporal (05/31/24 0928)  Respirations: 18 (06/04/24 0714)  Height: 5' 11\" (180.3 cm) (05/31/24 0810)  Weight - Scale: 114 kg (251 lb 12.8 oz) (06/04/24 0600)  SpO2: 96 % (06/04/24 0714)  Exam:   Physical Exam  Constitutional:       General: He is not in acute distress.  HENT:      Head: Normocephalic and atraumatic.   Eyes:      General: No scleral icterus.  Cardiovascular:      Rate and Rhythm: Normal rate. Rhythm irregular.      Heart sounds: No murmur heard.     No friction rub. No gallop.      Comments: Irregularly irregular rhythm  Pulmonary:      Effort: No respiratory distress.      Breath sounds: No wheezing.   Musculoskeletal:      Right lower leg: Edema (1+) present.      Left lower leg: Edema (1+) present.   Skin:     General: Skin is warm.      Coloration: Skin is not jaundiced.   Neurological:      General: No focal deficit present.      Mental Status: He is alert and oriented to person, place, and time.   Psychiatric:         Mood and Affect: Mood normal.          Discussion with Family: Updated  (wife) at " bedside.    Discharge instructions/Information to patient and family:   See after visit summary for information provided to patient and family.      Provisions for Follow-Up Care:  See after visit summary for information related to follow-up care and any pertinent home health orders.       Mobility at time of Discharge:   Basic Mobility Inpatient Raw Score: 24  JH-HLM Goal: 8: Walk 250 feet or more  JH-HLM Achieved: 7: Walk 25 feet or more  HLM Goal achieved. Continue to encourage appropriate mobility.    Disposition:   Home    Planned Readmission: none

## 2024-06-04 NOTE — PROGRESS NOTES
"Cardiology Progress Note - Antonio Mckeon 53 y.o. male MRN: [id number]    Unit/Bed#: 2 E 283-01 Encounter: [id number]      Assessment/Plan:   New onset acute HFrEF  Appears euvolemic on exam  Medication Regiment Includes:   Diuretic: Lasix 20 mg daily as needed  GDMT: as noted below  Recommend low-sodium diet, daily weights, strict I's and O's.  Recommend continuing to monitor and replete electrolytes as needed.    2.  New onset NICM-EF 40%  Etiology: Likely tachycardia mediated in the setting of A-fib with RVR  Appears euvolemic on exam  Medication Regiment Includes:   Diuretic: Lasix 20 mg daily as needed  GDMT:   Beta Blocker: Continue Toprol 100 mg daily  ACEI/ARB/ARNI: Continue lisinopril 10 mg daily  Patient is eager to be discharged from the hospital. Recommend optimization of GDMT in the outpatient setting.    3.  Newly diagnosed atrial fibrillation with RVR  He is s/p SPENCER/DCCV 5/31 with successful conversion to sinus rhythm, subsequently converted back to atrial fibrillation on 6/2.  Patient remains in atrial fibrillation today with adequate rate control   Continue anticoagulation with Eliquis.  [id number] 2  Continue rate control with Toprol 100 mg daily.  Ambulatory referral to EP has been placed for evaluation for A-fib ablation.    4.  Hypertension  Continue lisinopril 10 mg daily, Toprol 100 mg daily     Cardiology will sign-off. Please reach out with any further questions or concerns.        Subjective:   Patient seen and examined.  He states he is overall feeling well today.  He denies chest pain/pressure, shortness of breath, orthopnea, PND, lightheadedness, dizziness, syncope, palpitations.  Endorses right lower extremity edema which he states is his baseline.  He underwent cardiac catheterization yesterday which revealed no significant obstructive CAD    Objective:     Vitals: Blood pressure 131/94, pulse 69, temperature 98 °F (36.7 °C), resp. rate 18, height 5' 11\" (1.803 m), weight 114 kg (251 " lb 12.8 oz), SpO2 96%., Body mass index is 35.12 kg/m².,   Orthostatic Blood Pressures      Flowsheet Row Most Recent Value   Blood Pressure 131/94 filed at 06/04/2024 0714   Patient Position - Orthostatic VS Lying filed at 05/31/2024 0300              Intake/Output Summary (Last 24 hours) at 6/4/2024 1351  Last data filed at 6/4/2024 1037  Gross per 24 hour   Intake 1875.25 ml   Output --   Net 1875.25 ml         Physical Exam:   GEN: Alert and oriented x 3, in no acute distress.  Well appearing and well nourished.   HEENT: Sclera anicteric, conjunctivae pink, mucous membranes moist. Oropharynx clear.   NECK: Supple, no significant JVD. Trachea midline, no thyromegaly.   HEART: Regular rhythm, normal S1 and S2, no murmurs, clicks, gallops or rubs. PMI nondisplaced, no thrills.   LUNGS: Clear to auscultation bilaterally; no wheezes, rales, or rhonchi. No increased work of breathing or signs of respiratory distress.   ABDOMEN: Soft, nontender, non-distended.   EXTREMITIES: Skin warm and well perfused, no clubbing, cyanosis. Trace pitting edema to RLE.  NEURO: No focal findings. Normal speech. Mood and affect normal.   SKIN: Normal without suspicious lesions on exposed skin.        Telemetry:  Telemetry Orders (From admission, onward)               24 Hour Telemetry Monitoring  Continuous x 24 Hours (Telem)        Question:  Reason for 24 Hour Telemetry  Answer:  Arrhythmias requiring acute medical intervention / PPM or ICD malfunction                     Telemetry Reviewed:  Atrial fibrillation, rates       Medications:      Current Facility-Administered Medications:     acetaminophen (TYLENOL) tablet 650 mg, 650 mg, Oral, Q4H PRN, Dorothy Ramon PA-C    apixaban (ELIQUIS) tablet 5 mg, 5 mg, Oral, BID, Oscar Montenegro MD, 5 mg at 06/04/24 1037    furosemide (LASIX) tablet 20 mg, 20 mg, Oral, Daily PRN, Dorothy Ramon PA-C    lisinopril (ZESTRIL) tablet 10 mg, 10 mg, Oral, Daily, Dorothy  Arlette Ramon PA-C, 10 mg at 06/04/24 0826    metoprolol succinate (TOPROL-XL) 24 hr tablet 100 mg, 100 mg, Oral, Daily, Dorothy Arlette Ramon PA-C, 100 mg at 06/04/24 0826     Labs & Results:    Results from last 7 days   Lab Units 05/30/24  0044 05/29/24  2246 05/29/24 2022   HS TNI 0HR ng/L  --   --  28   HS TNI 2HR ng/L  --  28  --    HS TNI 4HR ng/L 23  --   --    HSTNI D4 ng/L -5  --   --      Results from last 7 days   Lab Units 06/04/24  0501 06/03/24 0440 06/02/24  0134   WBC Thousand/uL 6.04 6.69 6.79   HEMOGLOBIN g/dL 14.0 14.0 13.0   HEMATOCRIT % 44.5 44.3 39.9   PLATELETS Thousands/uL 253 255 241         Results from last 7 days   Lab Units 06/04/24  0501 06/03/24  0440 06/02/24  0134 05/31/24  0541 05/29/24 2022   POTASSIUM mmol/L 3.7 3.5 3.6   < > 3.7   CHLORIDE mmol/L 102 103 103   < > 105   CO2 mmol/L 31 31 31   < > 25   BUN mg/dL 16 13 17   < > 14   CREATININE mg/dL 1.01 0.85 0.99   < > 0.96   CALCIUM mg/dL 9.1 9.2 8.9   < > 9.6   ALK PHOS U/L  --   --   --   --  93   ALT U/L  --   --   --   --  35   AST U/L  --   --   --   --  29    < > = values in this interval not displayed.     Results from last 7 days   Lab Units 06/04/24  0501 06/03/24  1253 06/03/24  0440 06/01/24  1932 06/01/24  1328 05/30/24  0748 05/30/24  0044   INR   --   --  1.15  --  1.37*  --  1.22*   PTT seconds 62* 86* 114*   < > 35   < > 35    < > = values in this interval not displayed.     Results from last 7 days   Lab Units 06/04/24  0501 06/03/24  0440 06/02/24  0134   MAGNESIUM mg/dL 2.0 1.9 1.8*

## 2024-06-04 NOTE — PLAN OF CARE
Problem: PAIN - ADULT  Goal: Verbalizes/displays adequate comfort level or baseline comfort level  Description: Interventions:  - Encourage patient to monitor pain and request assistance  - Assess pain using appropriate pain scale  - Administer analgesics based on type and severity of pain and evaluate response  - Implement non-pharmacological measures as appropriate and evaluate response  - Consider cultural and social influences on pain and pain management  - Notify physician/advanced practitioner if interventions unsuccessful or patient reports new pain  Outcome: Progressing     Problem: SAFETY ADULT  Goal: Patient will remain free of falls  Description: INTERVENTIONS:  - Educate patient/family on patient safety including physical limitations  - Instruct patient to call for assistance with activity   - Consult OT/PT to assist with strengthening/mobility   - Keep Call bell within reach  - Keep bed low and locked with side rails adjusted as appropriate  - Keep care items and personal belongings within reach  - Initiate and maintain comfort rounds  - Make Fall Risk Sign visible to staff  - Offer Toileting every 2 Hours, in advance of need    - Obtain necessary fall risk management equipment: call bell within reach  - Apply yellow socks and bracelet for high fall risk patients  - Consider moving patient to room near nurses station  Outcome: Progressing     Problem: Knowledge Deficit  Goal: Patient/family/caregiver demonstrates understanding of disease process, treatment plan, medications, and discharge instructions  Description: Complete learning assessment and assess knowledge base.  Interventions:  - Provide teaching at level of understanding  - Provide teaching via preferred learning methods  Outcome: Progressing     Problem: Nutrition/Hydration-ADULT  Goal: Nutrient/Hydration intake appropriate for improving, restoring or maintaining nutritional needs  Description: Monitor and assess patient's  nutrition/hydration status for malnutrition. Collaborate with interdisciplinary team and initiate plan and interventions as ordered.  Monitor patient's weight and dietary intake as ordered or per policy. Utilize nutrition screening tool and intervene as necessary. Determine patient's food preferences and provide high-protein, high-caloric foods as appropriate.     INTERVENTIONS:  - Monitor oral intake, urinary output, labs, and treatment plans  - Assess nutrition and hydration status and recommend course of action  - Evaluate amount of meals eaten  - Assist patient with eating if necessary   - Allow adequate time for meals  - Recommend/ encourage appropriate diets, oral nutritional supplements, and vitamin/mineral supplements  - Order, calculate, and assess calorie counts as needed  - Recommend, monitor, and adjust tube feedings and TPN/PPN based on assessed needs  - Assess need for intravenous fluids  - Provide specific nutrition/hydration education as appropriate  - Include patient/family/caregiver in decisions related to nutrition  Outcome: Progressing     Problem: CARDIOVASCULAR - ADULT  Goal: Maintains optimal cardiac output and hemodynamic stability  Description: INTERVENTIONS:  - Monitor I/O, vital signs and rhythm  - Monitor for S/S and trends of decreased cardiac output  - Administer and titrate ordered vasoactive medications to optimize hemodynamic stability  - Assess quality of pulses, skin color and temperature  - Assess for signs of decreased coronary artery perfusion  - Instruct patient to report change in severity of symptoms  Outcome: Progressing  Goal: Absence of cardiac dysrhythmias or at baseline rhythm  Description: INTERVENTIONS:  - Continuous cardiac monitoring, vital signs, obtain 12 lead EKG if ordered  - Administer antiarrhythmic and heart rate control medications as ordered  - Monitor electrolytes and administer replacement therapy as ordered  Outcome: Progressing

## 2024-06-04 NOTE — TELEPHONE ENCOUNTER
----- Message from Dorothy Ramon PA-C sent at 6/4/2024  1:32 PM EDT -----  Regarding: Hospital Follow-Up  Cardiology Follow-up:    Patient clinical visit in 3-4 week at the Cardio location: [place] office. .    Schedule visit with Cardio [place] Providers: first available provider.    Type of Visit: VISIT TYPE: in-person office visit.    Test ordered: Cardiac tests: EP referral.

## 2024-06-04 NOTE — PLAN OF CARE
Problem: PAIN - ADULT  Goal: Verbalizes/displays adequate comfort level or baseline comfort level  Description: Interventions:  - Encourage patient to monitor pain and request assistance  - Assess pain using appropriate pain scale  - Administer analgesics based on type and severity of pain and evaluate response  - Implement non-pharmacological measures as appropriate and evaluate response  - Consider cultural and social influences on pain and pain management  - Notify physician/advanced practitioner if interventions unsuccessful or patient reports new pain  Outcome: Progressing     Problem: INFECTION - ADULT  Goal: Absence or prevention of progression during hospitalization  Description: INTERVENTIONS:  - Assess and monitor for signs and symptoms of infection  - Monitor lab/diagnostic results  - Monitor all insertion sites, i.e. indwelling lines, tubes, and drains  - Monitor endotracheal if appropriate and nasal secretions for changes in amount and color  - [place] appropriate cooling/warming therapies per order  - Administer medications as ordered  - Instruct and encourage patient and family to use good hand hygiene technique  - Identify and instruct in appropriate isolation precautions for identified infection/condition  Outcome: Progressing     Problem: SAFETY ADULT  Goal: Patient will remain free of falls  Description: INTERVENTIONS:  - Educate patient/family on patient safety including physical limitations  - Instruct patient to call for assistance with activity   - Consult OT/PT to assist with strengthening/mobility   - Keep Call bell within reach  - Keep bed low and locked with side rails adjusted as appropriate  - Keep care items and personal belongings within reach  - Initiate and maintain comfort rounds  - Make Fall Risk Sign visible to staff  - Offer Toileting every 2 Hours, in advance of need  - Initiate/Maintain alarm  - Obtain necessary fall risk management equipment:   - Apply yellow socks and  bracelet for high fall risk patients  - Consider moving patient to room near nurses station  Outcome: Progressing  Goal: Maintain or return to baseline ADL function  Description: INTERVENTIONS:  -  Assess patient's ability to carry out ADLs; assess patient's baseline for ADL function and identify physical deficits which impact ability to perform ADLs (bathing, care of mouth/teeth, toileting, grooming, dressing, etc.)  - Assess/evaluate cause of self-care deficits   - Assess range of motion  - Assess patient's mobility; develop plan if impaired  - Assess patient's need for assistive devices and provide as appropriate  - Encourage maximum independence but intervene and supervise when necessary  - Involve family in performance of ADLs  - Assess for home care needs following discharge   - Consider OT consult to assist with ADL evaluation and planning for discharge  - Provide patient education as appropriate  Outcome: Progressing  Goal: Maintains/Returns to pre admission functional level  Description: INTERVENTIONS:  - Perform AM-PAC 6 Click Basic Mobility/ Daily Activity assessment daily.  - Set and communicate daily mobility goal to care team and patient/family/caregiver.   - Collaborate with rehabilitation services on mobility goals if consulted  - Perform Range of Motion 3 times a day.  - Reposition patient every 2 hours.  - Dangle patient 3 times a day  - Stand patient 3 times a day  - Ambulate patient 3 times a day  - Out of bed to chair 3 times a day   - Out of bed for meals 3 times a day  - Out of bed for toileting  - Record patient progress and toleration of activity level   Outcome: Progressing     Problem: DISCHARGE PLANNING  Goal: Discharge to home or other facility with appropriate resources  Description: INTERVENTIONS:  - Identify barriers to discharge w/patient and caregiver  - Arrange for needed discharge resources and transportation as appropriate  - Identify discharge learning needs (meds, wound care,  etc.)  - Arrange for interpretive services to assist at discharge as needed  - Refer to Case Management Department for coordinating discharge planning if the patient needs post-hospital services based on physician/advanced practitioner order or complex needs related to functional status, cognitive ability, or social support system  Outcome: Progressing     Problem: Knowledge Deficit  Goal: Patient/family/caregiver demonstrates understanding of disease process, treatment plan, medications, and discharge instructions  Description: Complete learning assessment and assess knowledge base.  Interventions:  - Provide teaching at level of understanding  - Provide teaching via preferred learning methods  Outcome: Progressing     Problem: Nutrition/Hydration-ADULT  Goal: Nutrient/Hydration intake appropriate for improving, restoring or maintaining nutritional needs  Description: Monitor and assess patient's nutrition/hydration status for malnutrition. Collaborate with interdisciplinary team and initiate plan and interventions as ordered.  Monitor patient's weight and dietary intake as ordered or per policy. Utilize nutrition screening tool and intervene as necessary. Determine patient's food preferences and provide high-protein, high-caloric foods as appropriate.     INTERVENTIONS:  - Monitor oral intake, urinary output, labs, and treatment plans  - Assess nutrition and hydration status and recommend course of action  - Evaluate amount of meals eaten  - Assist patient with eating if necessary   - Allow adequate time for meals  - Recommend/ encourage appropriate diets, oral nutritional supplements, and vitamin/mineral supplements  - Order, calculate, and assess calorie counts as needed  - Recommend, monitor, and adjust tube feedings and TPN/PPN based on assessed needs  - Assess need for intravenous fluids  - Provide specific nutrition/hydration education as appropriate  - Include patient/family/caregiver in decisions related  to nutrition  Outcome: Progressing     Problem: CARDIOVASCULAR - ADULT  Goal: Maintains optimal cardiac output and hemodynamic stability  Description: INTERVENTIONS:  - Monitor I/O, vital signs and rhythm  - Monitor for S/S and trends of decreased cardiac output  - Administer and titrate ordered vasoactive medications to optimize hemodynamic stability  - Assess quality of pulses, skin color and temperature  - Assess for signs of decreased coronary artery perfusion  - Instruct patient to report change in severity of symptoms  Outcome: Progressing  Goal: Absence of cardiac dysrhythmias or at baseline rhythm  Description: INTERVENTIONS:  - Continuous cardiac monitoring, vital signs, obtain 12 lead EKG if ordered  - Administer antiarrhythmic and heart rate control medications as ordered  - Monitor electrolytes and administer replacement therapy as ordered  Outcome: Progressing

## 2024-06-05 ENCOUNTER — TRANSITIONAL CARE MANAGEMENT (OUTPATIENT)
Dept: FAMILY MEDICINE CLINIC | Facility: CLINIC | Age: 54
End: 2024-06-05

## 2024-06-05 NOTE — UTILIZATION REVIEW
NOTIFICATION OF ADMISSION DISCHARGE   This is a Notification of Discharge from [facility]. Please be advised that this patient has been discharge from our facility. Below you will find the admission and discharge date and time including the patient’s disposition.   UTILIZATION REVIEW CONTACT:  Citlalli Stewart  Utilization   Network Utilization Review Department  Phone: [phone] x carefully listen to the prompts. All voicemails are confidential.  Email: [email or web address]     ADMISSION INFORMATION  PRESENTATION DATE: 5/29/2024  8:08 PM  OBERVATION ADMISSION DATE:   INPATIENT ADMISSION DATE: 5/29/24 10:21 PM   DISCHARGE DATE: 6/4/2024  1:57 PM   DISPOSITION:Home/Self Care    Network Utilization Review Department  ATTENTION: Please call with any questions or concerns to [phone] and carefully listen to the prompts so that you are directed to the right person. All voicemails are confidential.   For Discharge needs, contact Care Management DC Support Team at [phone] opt. 2  Send all requests for admission clinical reviews, approved or denied determinations and any other requests to dedicated fax number below belonging to the campus where the patient is receiving treatment. List of dedicated fax numbers for the Facilities:  FACILITY NAME UR FAX NUMBER   ADMISSION DENIALS (Administrative/Medical Necessity) [phone]   DISCHARGE SUPPORT TEAM ([facility]) [phone]   PARENT CHILD HEALTH (Maternity/NICU/Pediatrics) [phone]   [facility] [phone]   [facility] [phone]   [facility] [phone]   [facility] [phone]   [facility] [phone]   [facility] [phone]   [facility] [phone]   [facility] [phone]    [facility] [phone]   [facility] [phone]   [facility] [phone]   [facility] [phone]

## 2024-06-13 ENCOUNTER — OFFICE VISIT (OUTPATIENT)
Dept: FAMILY MEDICINE CLINIC | Facility: CLINIC | Age: 54
End: 2024-06-13
Payer: COMMERCIAL

## 2024-06-13 VITALS
SYSTOLIC BLOOD PRESSURE: 122 MMHG | HEIGHT: 71 IN | BODY MASS INDEX: 33.32 KG/M2 | HEART RATE: 91 BPM | WEIGHT: 238 LBS | OXYGEN SATURATION: 99 % | TEMPERATURE: 97.8 F | DIASTOLIC BLOOD PRESSURE: 84 MMHG

## 2024-06-13 DIAGNOSIS — Z76.89 ENCOUNTER FOR SUPPORT AND COORDINATION OF TRANSITION OF CARE: Primary | ICD-10-CM

## 2024-06-13 DIAGNOSIS — I42.9 CARDIOMYOPATHY, UNSPECIFIED TYPE (HCC): ICD-10-CM

## 2024-06-13 DIAGNOSIS — I48.91 NEW ONSET ATRIAL FIBRILLATION (HCC): ICD-10-CM

## 2024-06-13 DIAGNOSIS — J30.2 SEASONAL ALLERGIES: ICD-10-CM

## 2024-06-13 PROCEDURE — 99495 TRANSJ CARE MGMT MOD F2F 14D: CPT | Performed by: PHYSICIAN ASSISTANT

## 2024-06-13 RX ORDER — LORATADINE 10 MG/1
10 TABLET ORAL DAILY
Start: 2024-06-13

## 2024-06-13 NOTE — PROGRESS NOTES
[facility] Discharge Treatment Plan    Yael Alcantara  1999  Case # [id number]    Location: [] [place] [x] Milly Nathan    A. Stresses that I need to monitor  1. Financial and need to work   2. Past Stomache ulcers and stress      B. Major triggers to using alcohol/drugs   1. Maintain and always remember the benefits of sober living vs the costs of addiction,   Myths that drugs make memore   healthy such as Marijuana. Sober Plan   1. Call Dr. Yaa Armstrong as needed   2. Client moving closer to his mother out of state   3. Client maintain sober living so he has control of his life    C. Sobriety Support   1. Friend: Romeo   2. Treatment staff: [facility], Dr. Yaa Armstrong   3. Family member: Mother, grandmother, great aunt   3. AA/NA recovery program, sponsor, meetings day/times, daily ready: Client is reading and researching health related and MH related. Client calls MD on the phone. D. Non-using activities  1. Client enjoys his dogs and family activities. 2. Client enjoys walking  3. Client enjoying baking foods    E. Consequences of Drug/Alcohol use  1. PV, lose jobs, and [facility]   2. Health issues       G. Short term goals to achieve  1. Buy a car and DL back  2. Client taking care of his MH, and physical care, &  walking    H. Follow up recommendations  1. Client follow [facility] guidelines and PO's expectations   2. Client to follow up with Medical Providers    Yael Alcantara / 1999 has participated in the discharge treatment plan development outlined above on 6/9/2023.      Eliseo Lomax [facility]  6/9/2023/12:07 PM Transition of Care Visit  Name: Antonio Mckeon      : 1970      MRN: [id number]  Encounter Provider: Kayla Fall PA-C  Encounter Date: 2024   Encounter department: [facility] [address]    Assessment & Plan   1. Encounter for support and coordination of transition of care  2. Cardiomyopathy, unspecified type (HCC)  3. New onset atrial fibrillation with RVR (HCC)  4. Seasonal allergies  -     loratadine (CLARITIN) 10 mg tablet; Take 1 tablet (10 mg total) by mouth daily         History of Present Illness   {Disappearing Hyperlinks I Encounters * My Last Note * Since Last Visit * History :71620}  Transitional Care Management Review:   Antonio Mckeon is a 53 y.o. male here for TCM follow up.     During the TCM phone call patient stated:  TCM Call     Date and time call was made  2024  8:44 AM    Hospital care reviewed  Records reviewed    Patient was hospitialized at  [facility]    Date of Admission  24    Date of discharge  24    Diagnosis  Bilateral lower extremity edema    Disposition  Home    Were the patients medications reviewed and updated  Yes    Current Symptoms  None      TCM Call     Post hospital issues  None    Should patient be enrolled in anticoag monitoring?  Yes    Scheduled for follow up?  Yes    Did you obtain your prescribed medications  Yes    Do you need help managing your prescriptions or medications  No    Is transportation to your appointment needed  No    I have advised the patient to call PCP with any new or worsening symptoms  Dennise andre/MA    Living Arrangements  Spouse or Significiant other    Support System  Spouse    The type of support provided  Emotional    Do you have social support  Yes, as much as I need    Are you recieving any outpatient services  No    Are you recieving home care services  No    Are you using any community resources  No    Current waiver services  No    Have you fallen in  "the last 12 months  No    Interperter language line needed  No    Counseling  Patient    Counseling topics  Activities of daily living; patient and family education    Comments  Seen by patient Barbara Mendoza on 6/7/2024  9:07 AM        Pt is here for his hospital follow up. He is feeling better. He had cardiac cath that saw no blockages. He was started on Metoprolol, Lasix and Eliquis. He is to follow up with cardiac electrophysiology and has appointment scheduled. He has lost 50 pounds since he was admitted to the hospital.      Review of Systems   Constitutional:  Positive for fatigue. Negative for chills and diaphoresis.   HENT:  Positive for congestion and rhinorrhea.    Respiratory:  Negative for chest tightness and shortness of breath.    Cardiovascular:  Negative for chest pain, palpitations and leg swelling.   Gastrointestinal:  Negative for abdominal pain.   Neurological:  Negative for dizziness, light-headedness and headaches.     Objective   {Disappearing Hyperlinks   Review Vitals * Enter New Vitals * Results Review * Labs * Imaging * Cardiology * Procedures * Lung Cancer Screening :86057}  /84 (BP Location: Left arm, Patient Position: Sitting, Cuff Size: Large)   Pulse 91   Temp 97.8 °F (36.6 °C)   Ht 5' 11\" (1.803 m)   Wt 108 kg (238 lb)   SpO2 99%   BMI 33.19 kg/m²     Physical Exam  Constitutional:       Appearance: Normal appearance.   Eyes:      Conjunctiva/sclera: Conjunctivae normal.   Cardiovascular:      Rate and Rhythm: Normal rate and regular rhythm.      Pulses: Normal pulses.      Heart sounds: Normal heart sounds.   Pulmonary:      Effort: Pulmonary effort is normal.      Breath sounds: Normal breath sounds.   Musculoskeletal:      Right lower leg: No edema.      Left lower leg: No edema.   Neurological:      Mental Status: He is alert and oriented to person, place, and time.   Psychiatric:         Mood and Affect: Mood normal.         Behavior: Behavior normal. "       Medications have been reviewed by provider in current encounter    Administrative Statements {Disappearing Hyperlinks I  [facility] of Service [facility]}

## 2024-07-03 ENCOUNTER — OFFICE VISIT (OUTPATIENT)
Dept: CARDIOLOGY CLINIC | Facility: CLINIC | Age: 54
End: 2024-07-03
Payer: COMMERCIAL

## 2024-07-03 VITALS
OXYGEN SATURATION: 99 % | SYSTOLIC BLOOD PRESSURE: 128 MMHG | BODY MASS INDEX: 33.18 KG/M2 | HEIGHT: 71 IN | RESPIRATION RATE: 16 BRPM | DIASTOLIC BLOOD PRESSURE: 90 MMHG | HEART RATE: 56 BPM | WEIGHT: 237 LBS

## 2024-07-03 DIAGNOSIS — I42.8 OTHER CARDIOMYOPATHY (HCC): ICD-10-CM

## 2024-07-03 DIAGNOSIS — I50.20 HFREF (HEART FAILURE WITH REDUCED EJECTION FRACTION) (HCC): Primary | ICD-10-CM

## 2024-07-03 DIAGNOSIS — Z12.11 ENCOUNTER FOR SCREENING COLONOSCOPY: ICD-10-CM

## 2024-07-03 DIAGNOSIS — I10 PRIMARY HYPERTENSION: ICD-10-CM

## 2024-07-03 DIAGNOSIS — I48.91 NEW ONSET ATRIAL FIBRILLATION (HCC): ICD-10-CM

## 2024-07-03 DIAGNOSIS — R60.0 BILATERAL LOWER EXTREMITY EDEMA: ICD-10-CM

## 2024-07-03 PROCEDURE — 93000 ELECTROCARDIOGRAM COMPLETE: CPT | Performed by: NURSE PRACTITIONER

## 2024-07-03 PROCEDURE — 99214 OFFICE O/P EST MOD 30 MIN: CPT | Performed by: NURSE PRACTITIONER

## 2024-07-03 RX ORDER — FUROSEMIDE 20 MG/1
20 TABLET ORAL DAILY PRN
Qty: 90 TABLET | Refills: 3 | Status: SHIPPED | OUTPATIENT
Start: 2024-07-03 | End: 2025-06-28

## 2024-07-03 RX ORDER — LISINOPRIL 5 MG/1
5 TABLET ORAL DAILY
Qty: 90 TABLET | Refills: 3 | Status: SHIPPED | OUTPATIENT
Start: 2024-07-03

## 2024-07-03 RX ORDER — LISINOPRIL 10 MG/1
10 TABLET ORAL DAILY
Qty: 90 TABLET | Refills: 3 | Status: SHIPPED | OUTPATIENT
Start: 2024-07-03 | End: 2024-07-03

## 2024-07-03 RX ORDER — METOPROLOL SUCCINATE 100 MG/1
100 TABLET, EXTENDED RELEASE ORAL DAILY
Qty: 90 TABLET | Refills: 3 | Status: SHIPPED | OUTPATIENT
Start: 2024-07-03 | End: 2025-06-28

## 2024-07-03 NOTE — PROGRESS NOTES
Cardiology Office Note    Antonio Mckeon 53 y.o. male MRN: [id number]    07/03/24          Assessment/Plan:    1.  Nonischemic cardiomyopathy with an EF of 40%/chronic HFrEF  -Patient currently euvolemic  - Continue as needed furosemide for weight gain of 3 pounds in 1 day or 5 pounds in 1 week  - Continue lisinopril, but decrease to 5 mg secondary to hypotension  - Continue metoprolol succinate  -Continue low-sodium diet and daily weights    2.  Persistent atrial fibrillation  - EKG performed today which shows patient in atrial fibrillation with a rate in the 90s to 100s  - Continue metoprolol succinate 100 mg daily for rate control  - Continue Eliquis for anticoagulation  - Patient has follow-up appoint with EP in August to discuss rhythm control options    3.  Hypertension  - Patient reports episodes of hypotension  - Lisinopril decreased to 5 mg daily  - Advised to continue to monitor his blood pressure and notify the office if it does not improve  - Also advised to stay hydrated    4.  History of rheumatoid arthritis    Follow up: 2 months or sooner as needed    1. HFrEF (heart failure with reduced ejection fraction) (HCC)        2. New onset atrial fibrillation with RVR (HCC)  POCT ECG    apixaban (Eliquis) 5 mg      3. Encounter for screening colonoscopy  Ambulatory referral for colon cancer education      4. Bilateral lower extremity edema  furosemide (LASIX) 20 mg tablet    metoprolol succinate (TOPROL-XL) 100 mg 24 hr tablet      5. Other cardiomyopathy (HCC)  furosemide (LASIX) 20 mg tablet    metoprolol succinate (TOPROL-XL) 100 mg 24 hr tablet      6. Primary hypertension  lisinopril (ZESTRIL) 5 mg tablet    DISCONTINUED: lisinopril (ZESTRIL) 10 mg tablet          HPI: Antonio Mckeon is a 53 y.o. year old male with a past medical history of recent nonischemic cardiomyopathy with an EF of 40%/HFrEF, new onset atrial fibrillation, hypertension, rheumatoid arthritis who presents today for hospital  follow-up.  He was admitted to [facility] at the end of May 2024 was noted to have a new cardiomyopathy with an EF of 40% with moderate global hypokinesis and grade 2 diastolic dysfunction also noted on TTE.  He subsequently underwent SPENCER and cardioversion with successful conversion to atrial fibrillation.  He also had coronary angiography performed which revealed no significant epicardial CAD.    He reports that he is feeling much better.  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  On auscultation of palpitation of his pulse it is irregular and EKG was performed which showed he is in rate controlled show fibrillation with a rate in the high 90s low 100s.  Does have an appointment with electrophysiology on August 8 for further discussion about rhythm control.  For now he will continue his metoprolol succinate for rate control and notify the office if he has any further significant symptoms.  He also does report episodes of significant hypotension with blood pressures in the 80s and 90s systolic for his lisinopril be decreased to 5 mg daily.  He will be scheduled for repeat TTE at the end of August to reevaluate his LVEF.    He denies chest pain, dyspnea on exertion or rest, lower extremity edema, lightheadedness, dizziness, syncopal episodes,or palpitations and was instructed to call  the office or seek medical attention if any such symptoms develop.  He was instructed to follow a 2000 mg sodium restricted diet and to weigh themselves daily, and report a weight gain of 3 pounds in 1 day or 5 pounds in 1 week.  He was provided with verbal and written heart failure education.    All medications reviewed and patient is tolerating medications without side effects.  He does have as needed Lasix prescribed for weight gain of 3 pounds in 1 day or 5 pounds in 1 week and reports he has not to use  this.    Family History: Mother with atrial fibrillation    Social history: Occasional cigar, currently no alcohol    EKG-rate controlled atrial fibrillation with a heart rate in the 90s to 100s    Patient Active Problem List   Diagnosis    Pain in both hands    Encounter for support and coordination of transition of care    Rheumatoid arthritis of multiple sites with negative rheumatoid factor (HCC)    Primary hypertension    Intermittent palpitations    Cardiomyopathy (HCC)    New onset atrial fibrillation with RVR (HCC)       No Known Allergies      Current Outpatient Medications:     apixaban (Eliquis) 5 mg, Take 1 tablet (5 mg total) by mouth 2 (two) times a day, Disp: 90 tablet, Rfl: 3    furosemide (LASIX) 20 mg tablet, Take 1 tablet (20 mg total) by mouth daily as needed (For LE edema, weight gain of 3lb one day or 5lb one week), Disp: 90 tablet, Rfl: 3    lisinopril (ZESTRIL) 5 mg tablet, Take 1 tablet (5 mg total) by mouth daily, Disp: 90 tablet, Rfl: 3    loratadine (CLARITIN) 10 mg tablet, Take 1 tablet (10 mg total) by mouth daily, Disp: , Rfl:     metoprolol succinate (TOPROL-XL) 100 mg 24 hr tablet, Take 1 tablet (100 mg total) by mouth daily, Disp: 90 tablet, Rfl: 3    Past Medical History:   Diagnosis Date    Hypertension        Family History   Problem Relation Age of Onset    Diabetes Mother     Heart disease Mother     Rheum arthritis Mother     Alcohol abuse Father     Stomach cancer Father     Breast cancer Maternal Grandmother        Past Surgical History:   Procedure Laterality Date    CARDIAC CATHETERIZATION N/A 2010    CARDIAC CATHETERIZATION Left 6/3/2024    Procedure: Cardiac catheterization;  Surgeon: Rosalinda Novoa MD;  Location: [facility];  Service: Cardiology    CARDIAC CATHETERIZATION N/A 6/3/2024    Procedure: Cardiac Coronary Angiogram;  Surgeon: Rosalinda Novoa MD;  Location: [facility];  Service: Cardiology    KNEE ARTHROSCOPY W/ MENISCAL REPAIR Bilateral   "   ROTATOR CUFF REPAIR Left        Social History     Socioeconomic History    Marital status: Single     Spouse name: Not on file    Number of children: Not on file    Years of education: Not on file    Highest education level: Not on file   Occupational History    Not on file   Tobacco Use    Smoking status: Never    Smokeless tobacco: Never   Vaping Use    Vaping status: Never Used   Substance and Sexual Activity    Alcohol use: Yes     Comment: sobor x1 mo 5/2022    Drug use: No    Sexual activity: Not on file   Other Topics Concern    Not on file   Social History Narrative    Not on file     Social Determinants of Health     Financial Resource Strain: Not on file   Food Insecurity: No Food Insecurity (5/30/2024)    Hunger Vital Sign     Worried About Running Out of Food in the Last Year: Never true     Ran Out of Food in the Last Year: Never true   Transportation Needs: No Transportation Needs (5/30/2024)    PRAPARE - Transportation     Lack of Transportation (Medical): No     Lack of Transportation (Non-Medical): No   Physical Activity: Not on file   Stress: Not on file   Social Connections: Not on file   Intimate Partner Violence: Not on file   Housing Stability: Low Risk  (5/30/2024)    Housing Stability Vital Sign     Unable to Pay for Housing in the Last Year: No     Number of Times Moved in the Last Year: 0     Homeless in the Last Year: No       Review of symptoms:   Constitution: + Mild fatigue  HEENT:  Negative  Cardiovascular:  Negative  Respiratory:  Negative  Skin:  Negative  Gastrointestinal:  Negative  Genitourinary:  Negative  Musculoskeletal:  Negative  Neurological:  Negative  Endocrine:  Negative  Psychological:  Negative    Vitals: /90 (BP Location: Left arm, Patient Position: Sitting, Cuff Size: Standard)   Pulse 56   Resp 16   Ht 5' 11\" (1.803 m)   Wt 108 kg (237 lb)   SpO2 99%   BMI 33.05 kg/m²         Physical Exam:     GEN: Alert and oriented x 3, in no acute distress.  Well " appearing and well nourished.   HEENT: Sclera anicteric, conjunctivae pink, mucous membranes moist.   NECK: Supple, no carotid bruits, no significant JVD. Trachea midline.  HEART: + Irregularly irregular rhythm, normal S1 and S2, no murmurs, clicks, gallops or rubs.   LUNGS: Clear to auscultation bilaterally; no wheezes, rales, or rhonchi. No increased work of breathing or signs of respiratory distress.   ABDOMEN: Soft, nontender, nondistended, normoactive bowel sounds.   EXTREMITIES: Skin warm and well perfused, no clubbing, cyanosis, or edema.  NEURO: No focal findings. Normal speech. Mood and affect normal.   SKIN: Normal without suspicious lesions on exposed skin.

## 2024-07-04 ENCOUNTER — NURSE TRIAGE (OUTPATIENT)
Dept: OTHER | Facility: OTHER | Age: 54
End: 2024-07-04

## 2024-07-04 DIAGNOSIS — I48.91 NEW ONSET ATRIAL FIBRILLATION (HCC): ICD-10-CM

## 2024-07-04 NOTE — TELEPHONE ENCOUNTER
"Answer Assessment - Initial Assessment Questions  1. NAME of MEDICATION: \"What medicine are you calling about?\"      Eliquis    2. QUESTION: \"What is your question?\" (e.g., medication refill, side effect)      Patient requesting medication prescription be sent to a different pharmacy    Protocols used: Medication Question Call-ADULT-    "

## 2024-07-04 NOTE — TELEPHONE ENCOUNTER
"Regarding: med transfer  ----- Message from Sena ASHLEY sent at 7/4/2024 11:49 AM EDT -----  \" I want to know if my prescription for Eliquis to [place]. I need it sent to the [facility] in [facility]. \"    "

## 2024-07-05 ENCOUNTER — TELEPHONE (OUTPATIENT)
Age: 54
End: 2024-07-05

## 2024-07-05 ENCOUNTER — TELEPHONE (OUTPATIENT)
Dept: CARDIOLOGY CLINIC | Facility: CLINIC | Age: 54
End: 2024-07-05

## 2024-07-05 DIAGNOSIS — I48.91 NEW ONSET ATRIAL FIBRILLATION (HCC): ICD-10-CM

## 2024-07-05 NOTE — TELEPHONE ENCOUNTER
25w6d    Last seen for OB check on 05/16/24. Patient is asking if we can write a letter confirming she may participate in [employer] training classes. Please see her message below to see what this entails and advise if appropriate to provide letter? Thank you!    Routed to provider.    Med pending.

## 2024-07-05 NOTE — TELEPHONE ENCOUNTER
Pt is currently away, and run out of blood thinner, waiting for pharmacy to open to see if can get it transferred     Pt is asking in the mean time can he take baby asa ?    Please advise

## 2024-07-05 NOTE — TELEPHONE ENCOUNTER
----- Message from LORELEI Hoffman sent at 7/3/2024  5:57 PM EDT -----  Regarding: Blood pressure and follow-up echocardiogram  Please call patient and let him know that he should monitor his blood pressure for the next week and if it does not come up he should let the office know right away.  I also did order him to have a repeat ultrasound of his heart to check the pumping function should be done at the end of August.  These provide him with the phone number to schedule if he does not have it.    Thank you!  Elvi

## 2024-07-05 NOTE — TELEPHONE ENCOUNTER
Received returned call from pt. He stated he is currently in [facility] and cannot  his eliquis prescription at his normal pharmacy. He is wondering if he can get 10 pills of eliquis sent to a pharmacy in [facility] to get him through the weekend before coming home on Monday to  the original prescription at his local pharmacy. He would like the prescription sent to:    [facility]   [address]  [place], [place]    Please review and advise.

## 2024-07-05 NOTE — TELEPHONE ENCOUNTER
Pt called again asking about eliquis refill. Advised that refill is still pending and waiting Dr. Valdez signature.

## 2024-07-16 ENCOUNTER — TELEPHONE (OUTPATIENT)
Age: 54
End: 2024-07-16

## 2024-07-16 NOTE — TELEPHONE ENCOUNTER
Patients GI provider:   Unknown    Number to return call: ([phone]    Reason for call: Pt returned our call. We have a referral for colon. Pt states he will call back after seeing his cardio to schedule.    Scheduled procedure/appointment date if applicable: Apt/procedure N/A

## 2024-07-20 DIAGNOSIS — I48.91 NEW ONSET ATRIAL FIBRILLATION (HCC): ICD-10-CM

## 2024-07-22 DIAGNOSIS — I48.91 NEW ONSET ATRIAL FIBRILLATION (HCC): ICD-10-CM

## 2024-07-22 RX ORDER — APIXABAN 5 MG/1
5 TABLET, FILM COATED ORAL 2 TIMES DAILY
Qty: 60 TABLET | Refills: 11 | Status: SHIPPED | OUTPATIENT
Start: 2024-07-22

## 2024-07-22 NOTE — TELEPHONE ENCOUNTER
Caller: Antonio Mckeon     Doctor:Elvi Candelario     Reason for call: Pt wants MEDS sent to [facility] on [address]     Call back#: [phone]        P.S patient would like for the script to be written with REFILLS please since this is a  Reoccurring MED

## 2024-07-22 NOTE — TELEPHONE ENCOUNTER
Caller: Patient (Antonio)    Doctor: Dr Valdez    Reason for call: Patient called in regarding Eliquis prescription. Patient stated that he uses the [facility] in [place] ([address]) and would like to find out if auto refills are an option. He is currently out of his medication. Patient would like a call back.     Call back#: [phone]

## 2024-07-22 NOTE — TELEPHONE ENCOUNTER
Spoke with patient again, pharmacy stating insurance will not cover a refill until July 24th but he can pay cash for enough pills to cover until then, $10/pill. Attempted to call [place] office, closed 4p.     Prescription off kilter due to needing a partial refill July 5th due to holiday and pharmacy closed, patient was down the sure so a partial refill was sent there (28 tabs).    Can patient get some samples to help off set the discrepancy of the prescriptions please?    Patient can be reached at [phone].

## 2024-07-22 NOTE — TELEPHONE ENCOUNTER
Spoke with patient regarding prescription for Eliquis and if it was sent. Advised patient 353pm today had confirmation receipt [facility] received the prescription.     Advised wait a few more minutes, call them see if they did in fact receive prescription renewal, if not to call us back and we will look into the issue.

## 2024-07-23 ENCOUNTER — TELEPHONE (OUTPATIENT)
Dept: OBGYN CLINIC | Facility: CLINIC | Age: 54
End: 2024-07-23

## 2024-07-23 NOTE — TELEPHONE ENCOUNTER
LMOM to reschedule appt with Dr. Flores or Dr. Rae for back pain and shoulder pain. Dr Burger is a spine surgeon.

## 2024-08-08 ENCOUNTER — VBI (OUTPATIENT)
Dept: ADMINISTRATIVE | Facility: OTHER | Age: 54
End: 2024-08-08

## 2024-08-08 ENCOUNTER — CONSULT (OUTPATIENT)
Dept: CARDIOLOGY CLINIC | Facility: CLINIC | Age: 54
End: 2024-08-08
Payer: COMMERCIAL

## 2024-08-08 ENCOUNTER — PREP FOR PROCEDURE (OUTPATIENT)
Dept: CARDIOLOGY CLINIC | Facility: CLINIC | Age: 54
End: 2024-08-08

## 2024-08-08 ENCOUNTER — TELEPHONE (OUTPATIENT)
Dept: CARDIOLOGY CLINIC | Facility: CLINIC | Age: 54
End: 2024-08-08

## 2024-08-08 VITALS
HEIGHT: 71 IN | WEIGHT: 234 LBS | BODY MASS INDEX: 32.76 KG/M2 | SYSTOLIC BLOOD PRESSURE: 128 MMHG | DIASTOLIC BLOOD PRESSURE: 82 MMHG | HEART RATE: 86 BPM

## 2024-08-08 DIAGNOSIS — I48.11 LONGSTANDING PERSISTENT ATRIAL FIBRILLATION (HCC): Primary | ICD-10-CM

## 2024-08-08 DIAGNOSIS — I42.9 CARDIOMYOPATHY, UNSPECIFIED TYPE (HCC): ICD-10-CM

## 2024-08-08 DIAGNOSIS — I48.91 NEW ONSET ATRIAL FIBRILLATION (HCC): ICD-10-CM

## 2024-08-08 DIAGNOSIS — I48.91 ATRIAL FIBRILLATION (HCC): ICD-10-CM

## 2024-08-08 DIAGNOSIS — I10 PRIMARY HYPERTENSION: ICD-10-CM

## 2024-08-08 DIAGNOSIS — M54.40 ACUTE RIGHT-SIDED LOW BACK PAIN WITH SCIATICA, SCIATICA LATERALITY UNSPECIFIED: Primary | ICD-10-CM

## 2024-08-08 PROCEDURE — 93000 ELECTROCARDIOGRAM COMPLETE: CPT | Performed by: INTERNAL MEDICINE

## 2024-08-08 PROCEDURE — 99244 OFF/OP CNSLTJ NEW/EST MOD 40: CPT | Performed by: INTERNAL MEDICINE

## 2024-08-08 RX ORDER — CELECOXIB 200 MG/1
200 CAPSULE ORAL DAILY
Qty: 30 CAPSULE | Refills: 3 | Status: SHIPPED | OUTPATIENT
Start: 2024-08-08

## 2024-08-08 NOTE — PROGRESS NOTES
EPS Consultation/New Patient Evaluation   Heart [facility] Vascular Center  [facility] [address], [place] [address]    Name: Antonio Mckeon  : 1970  MRN: [id number]      Assessment/Plan:  New onset persistent afib  2024 admitted for new onset afib w/ RVR and new onset HFrEF (EF 40%, LA mildly dilated)  S/p cardioversion   Initiated on metoprolol succinate 100mg BID for rate control  AC: [id number] - 2; currently on Eliquis 5mg BID  While in afib notes increased fatigue and exercise intolerance   Ablation: None prior  Antiarrythmic: None  Device: None  CrCl 88  Reports has cut back on his alcohol and tobacco consumption, and has lost weight since his hospitalization  New onset NICM-EF 40%  Recent hospitalization 2024 for new onset HFrEF thought to be in the setting of new onset afib w/ RVR   On GDMT w/ Toprol, lisinopril  Maintained on lasix 20mg daily PRN   C negative for significant obstructive CAD  HTN  BP in office today 128/82      Discussion/Plan:    Patient recently diagnosed w/ new onset persistent afib and NICM w/ EF 40% presumed in the setting of Afib w/ RVR    Patient reports he notes ongoing palpitations for some time prior to this (possibly around November)    Modifiable risk factors for afib were discussed today. Patient affirms he has lost weight and continues to reduce his alcohol (2 twisted teas daily; used to drink a bottle of bourbon every other day) and tobacco consumption (cigar smoker now 2 per week; used to be 1-2 daily).    Discussed afib and available treatment options in detail today.    Plan for ablation, AAD initiation (tikosyn vs sotalol), and loop implantation (during ablation).    Continue metoprolol and eliquis at current dosing for now.    Our office will help schedule his procedures and price check medications.     Patient does note ongoing back pain. He has an appointment with Dr. Rae in a few days. Provided  "celebrex prescription in office today while awaiting further evaluation.     Patient has been instructed to follow up in our EP office post-procedure or as needed. He will call our office with any questions or concerns in the meantime.    Rhythm History:   Atrial fibrillation:      Atrial flutter:      SVT:      VT/VF/PVC:     Device history:   Pacemaker:     Defibrillator:     BIV PPM:     BIV ICD:     ILR:    Chief Complaint: new onset afib     HPI:   Antonio Mckeon is a 53 y.o. male with a PMH of Afib, HFrEF, and HTN.     Patient has a known cardiac history of HTN but was recently hospitalized in June 2024 for new onset acute CHF in the setting of new onset afib w/ RVR. During this hospital stay he was cardioverted and initiated on metoprolol + eliquis.    Post hospitalization he reports feeling improved, however reports ongoing fatigue and exercise intolerance while in afib. He notes that prior to his hospitalization he had been noticing palpitations and suspects he was in afib prior to his initial presentation.     Since his hospital stay he has lost weight, has been cutting back on his alcohol consumption, and cutting back on his tobacco consumption.      EKG: rate controlled afib       Review of Systems   Constitutional:  Negative for activity change, appetite change, chills, fatigue and fever.   HENT:  Negative for nosebleeds.    Respiratory:  Negative for chest tightness and shortness of breath.    Cardiovascular:  Negative for chest pain, palpitations and leg swelling.   Neurological:  Negative for dizziness, syncope, weakness and light-headedness.       Objective:     Vitals: Height 5' 11\" (1.803 m), weight 106 kg (234 lb)., Body mass index is 32.64 kg/m².,        Physical Exam:   Physical Exam  Constitutional:       General: He is not in acute distress.     Appearance: Normal appearance. He is not toxic-appearing.   HENT:      Head: Normocephalic and atraumatic.   Eyes:      General:         Right eye: " No discharge.         Left eye: No discharge.   Cardiovascular:      Rate and Rhythm: Normal rate and regular rhythm.      Pulses: Normal pulses.   Pulmonary:      Effort: Pulmonary effort is normal.      Breath sounds: Normal breath sounds.   Musculoskeletal:      Right lower leg: No edema.      Left lower leg: No edema.   Skin:     General: Skin is warm and dry.      Capillary Refill: Capillary refill takes less than 2 seconds.   Neurological:      Mental Status: He is alert.            Medications:      Current Outpatient Medications:     Eliquis 5 MG, TAKE 1 TABLET(5 MG) BY MOUTH TWICE DAILY, Disp: 60 tablet, Rfl: 11    lisinopril (ZESTRIL) 5 mg tablet, Take 1 tablet (5 mg total) by mouth daily, Disp: 90 tablet, Rfl: 3    metoprolol succinate (TOPROL-XL) 100 mg 24 hr tablet, Take 1 tablet (100 mg total) by mouth daily, Disp: 90 tablet, Rfl: 3    apixaban (Eliquis) 5 mg, Take 1 tablet (5 mg total) by mouth 2 (two) times a day (Patient not taking: Reported on 8/8/2024), Disp: 60 tablet, Rfl: 5    furosemide (LASIX) 20 mg tablet, Take 1 tablet (20 mg total) by mouth daily as needed (For LE edema, weight gain of 3lb one day or 5lb one week) (Patient not taking: Reported on 8/8/2024), Disp: 90 tablet, Rfl: 3    loratadine (CLARITIN) 10 mg tablet, Take 1 tablet (10 mg total) by mouth daily (Patient not taking: Reported on 8/8/2024), Disp: , Rfl:        Historical Information   Past Medical History:   Diagnosis Date    Hypertension        Past Surgical History:   Procedure Laterality Date    CARDIAC CATHETERIZATION N/A 2010    CARDIAC CATHETERIZATION Left 6/3/2024    Procedure: Cardiac catheterization;  Surgeon: Rosalinda Novoa MD;  Location: [facility];  Service: Cardiology    CARDIAC CATHETERIZATION N/A 6/3/2024    Procedure: Cardiac Coronary Angiogram;  Surgeon: Rosalinda Novoa MD;  Location: [facility];  Service: Cardiology    KNEE ARTHROSCOPY W/ MENISCAL REPAIR Bilateral     ROTATOR CUFF REPAIR  Left        Social History     Substance and Sexual Activity   Alcohol Use Yes    Comment: sobor x1 mo 5/2022     Social History     Substance and Sexual Activity   Drug Use No     Social History     Tobacco Use   Smoking Status Never   Smokeless Tobacco Never       Family History   Problem Relation Age of Onset    Diabetes Mother     Heart disease Mother     Rheum arthritis Mother     Alcohol abuse Father     Stomach cancer Father     Breast cancer Maternal Grandmother          Labs & Results:  Below is the patient's most recent value for Albumin, ALT, AST, BUN, Calcium, Chloride, Cholesterol, CO2, Creatinine, GFR, Glucose, HDL, Hematocrit, Hemoglobin, Hemoglobin A1C, LDL, Magnesium, Phosphorus, Platelets, Potassium, PSA, Sodium, Triglycerides, and WBC.   Lab Results   Component Value Date    ALT 35 05/29/2024    AST 29 05/29/2024    BUN 16 06/04/2024    CALCIUM 9.1 06/04/2024     06/04/2024    CO2 31 06/04/2024    CREATININE 1.01 06/04/2024    HCT 44.5 06/04/2024    HGB 14.0 06/04/2024    MG 2.0 06/04/2024     06/04/2024    K 3.7 06/04/2024    WBC 6.04 06/04/2024     Note: for a comprehensive list of the patient's lab results, access the Results Review activity.

## 2024-08-08 NOTE — LETTER
CARDIAC ABLATION INSTRUCTIONS     Antonio Mckeon   : 1970  MRN: [id number]   [address]  [place] [address]    Procedure:  Cardiac Ablation procedure/Med admission    Procedure Date: 2024    Location: [facility]  Address: [address] [place] [address], [place] [address]    Please call to Short Stay Center [phone] if not get call by 5.00pm.      Labs to be done AFTER 2024 BEFORE   2024   CMP /  CBC (FASTING 8 HOURS)    BLOOD THINNER INSTRUCTIONS (Coumadin / Warfarin / Pradaxa / Eliquis / Xarelto):   ELIQUIS: DO NOT take this medication the morning of the procedure. per Dr Stephens.       Medication Hold:   LASIX: DO NOT take this medication the morning of the procedure.   LISINOPRIL: DO NOT take this medication the day prior to the procedure and in the morning of the procedure.       Arrival Time: The Hospital will contact you the day prior to your procedure, usually between 4PM - 6PM to instruct you on the time and place to report. If you do not hear from a [facility]  by 6PM the evening prior to your procedure, please contact the campus you are scheduled at.     You may have NOTHING to eat or drink from midnight the night prior to your procedure including candy & gum.  You may have a SIP of water with your morning medications.    DO NOT stop taking Plavix or Aspirin unless advised otherwise.    Arrange for a responsible adult to drive you to and from the hospital.    Please notify us if you got a  NEW MEDICATION prescribed prior to your procedure or have been admitted to the hospital within 30 days.     You should continue to take your morning medications with a sip of water UNLESS ADVISED OTHERWISE.       DO NOT stop taking Plavix or Aspirin unless advised otherwise.     If you are currently taking Fish Oil, Krill oil and/or Vitamin E please DO NOT TAKE FOR A WEEK PRIOR TO PROCEDURE.     If you are diabetic, DO NOT take any of your diabetic pills the  morning of your procedure. Oral diabetic medications may include Glucophage, Prandin, Glyburide, Micronase, Avandia, Glucovance, Precose, Glynase, and Starlix.     Bring a list of daily medications, vitamins, minerals, herbals and nutritional supplements you take. Please include dosages and the times you take them each day.     If you are packing an overnight bag, pack minimal clothing, you will be given hospital sleepwear.   DO NOT bring money, valuables or jewelry. The wedding band is ok.     If you use CPAP machine, bring it to the hospital.      Bring your Photo ID and Insurance cards with you.     Please shower before your procedure and do not use powders or lotions.     If you develop a cold, sore throat, fever or any other illness prior to your procedure date, notify your surgeon immediately      FAILURE TO FOLLOW ANY OF THESE INSTRUCTIONS COULD RESULT IN THE CANCELLATION OF YOUR PROCEDURE      Please call [phone] if you do not hear from the  by 6:00PM the night before your procedure.    All patients enter through ENTRANCE B.  Parking is available free of charge or park on Parking Deck B.        Thank you,   Tashia Perez  Surgery Coordinator  [facility]   [address] [place], [place] [id number]  Ph: [phone]

## 2024-08-08 NOTE — TELEPHONE ENCOUNTER
DEVAN Ren MA; P Cardiology Surgery Coordinator  Hello,    This patient is being sent down to scheduling today for ablation + loop implantation scheduling. Post procedure he will need to be started on AAD. Can we please price check tikosyn 500mcg BID and sotalol 160mg BID.    Thanks,  - Yoshi          Patient scheduled for SPENCER/A fib/loop recorder/med admission  at [facility] on   9/26/2021  with Dr Stephens.    CT PV on 08/29/2024 at [facility]    Patient aware of general instructions, labs test required.     Meds holds:   Eliquis to hold the morning of the procedure  LASIX: Hold this medication the morning of the procedure.   LISINOPRIL: Hold  this medication the day prior to the procedure and in the morning of the procedure.

## 2024-08-08 NOTE — TELEPHONE ENCOUNTER
08/08/24 7:16 AM     Chart reviewed for CRC: Colonoscopy ; nothing is submitted to the patient's insurance at this time.     Cristy Zhou   [facility]

## 2024-08-09 NOTE — TELEPHONE ENCOUNTER
MED PRICING     [facility] # [phone]  Spoke With  Mary  Member ID# [id number]   Copay estimate price for:      Dofetilide 500 mcg. BID    Its not a preferred Drug.  Auth required,  After approval estimate copayment price can be the most $3.00  Fax # to submit and auth is  [phone]         Sotalol 160 mg. BID    It is a preferred drug for his plan, however patient is on Metoprolol medication which is contra indicate medication.  After documentation send to insurance prescription plan where show patient is off from metoprolol this medication copayment estimated price can also be the most $3.00.

## 2024-08-09 NOTE — TELEPHONE ENCOUNTER
PA for DOFETILIDE 500  Approved     Date(s) approved 8/9/2024-8/9/2025        Patient advised by          [x] MyChart Message  [] Phone call   []LMOM  []L/M to call office as no active Communication consent on file  [x]Unable to leave detailed message as VM not approved on Communication consent       Pharmacy advised by    []Fax  []Phone call    Approval letter scanned into Media Yes

## 2024-08-09 NOTE — TELEPHONE ENCOUNTER
PA for dofetililide 500mg bid SUBMITTED     via    [x]CMM-KEY  [id number]  [][email or web address]-Case ID #   []Faxed to plan   []Other website   []Phone call Case ID #     Office notes sent, clinical questions answered. Awaiting determination    Turnaround time for your insurance to make a decision on your Prior Authorization can take 7-21 business days.

## 2024-08-12 NOTE — TELEPHONE ENCOUNTER
Called patient and LVM to call me back in regard Antiarrhythmic  medication copayment price information.

## 2024-08-12 NOTE — TELEPHONE ENCOUNTER
MED PRICING   [facility] # [phone]  Spoke With  Mary  Member ID# [id number]   Copay estimate price for:       Dofetilide 500 mcg. BID    30 by 60 $0.00    Sotalol 160 mg. BID    30 by 60 $3.00

## 2024-08-13 ENCOUNTER — APPOINTMENT (OUTPATIENT)
Dept: RADIOLOGY | Facility: CLINIC | Age: 54
End: 2024-08-13
Payer: COMMERCIAL

## 2024-08-13 ENCOUNTER — OFFICE VISIT (OUTPATIENT)
Dept: OBGYN CLINIC | Facility: CLINIC | Age: 54
End: 2024-08-13
Payer: COMMERCIAL

## 2024-08-13 VITALS
BODY MASS INDEX: 32.76 KG/M2 | WEIGHT: 234 LBS | HEART RATE: 60 BPM | HEIGHT: 71 IN | SYSTOLIC BLOOD PRESSURE: 140 MMHG | DIASTOLIC BLOOD PRESSURE: 85 MMHG

## 2024-08-13 DIAGNOSIS — M54.40 ACUTE RIGHT-SIDED LOW BACK PAIN WITH SCIATICA, SCIATICA LATERALITY UNSPECIFIED: ICD-10-CM

## 2024-08-13 DIAGNOSIS — F40.240 CLAUSTROPHOBIA: ICD-10-CM

## 2024-08-13 DIAGNOSIS — M43.16 SPONDYLOLISTHESIS OF LUMBAR REGION: ICD-10-CM

## 2024-08-13 DIAGNOSIS — M53.3 SI (SACROILIAC) JOINT DYSFUNCTION: ICD-10-CM

## 2024-08-13 DIAGNOSIS — M47.816 LUMBAR FACET ARTHROPATHY: ICD-10-CM

## 2024-08-13 DIAGNOSIS — M51.9 LUMBAR DISC DISEASE: Primary | ICD-10-CM

## 2024-08-13 PROCEDURE — 72100 X-RAY EXAM L-S SPINE 2/3 VWS: CPT

## 2024-08-13 PROCEDURE — 99204 OFFICE O/P NEW MOD 45 MIN: CPT | Performed by: FAMILY MEDICINE

## 2024-08-13 RX ORDER — ALPRAZOLAM 0.5 MG
0.5 TABLET ORAL
Qty: 2 TABLET | Refills: 0 | Status: SHIPPED | OUTPATIENT
Start: 2024-08-13

## 2024-08-13 NOTE — PROGRESS NOTES
Assessment/Plan:  Assessment & Plan   Diagnoses and all orders for this visit:    Lumbar disc disease  -     XR spine lumbar 2 or 3 views injury; Future  -     MRI lumbar spine wo contrast; Future    Lumbar facet arthropathy  -     Ambulatory referral to Spine & Pain Management; Future    Spondylolisthesis of lumbar region    SI (sacroiliac) joint dysfunction  -     Ambulatory referral to Spine & Pain Management; Future    Claustrophobia  -     ALPRAZolam (XANAX) 0.5 mg tablet; Take 1 tablet (0.5 mg total) by mouth 30 min pre-procedure Take 2nd dose 30 mins after 1st dose if still anxious      53-year-old male with low back pain many years duration.  Discussed with patient physical exam, imaging studies, impression, and plan.  X-rays lumbar spine noted for multilevel facet degenerative changes and mild disc space narrowing.  There is slight anterolisthesis of L4 on L5.  Imaging studies, clinical exam, and history suggest that symptoms are due to worsening degenerative changes of the lumbar spine.  Symptoms have been worsening despite constant management of more than 3 months of aqua therapy continue with home exercise program, more than 6 months of NSAIDs.  At this time I will refer him for MRI of the lumbar spine to evaluate for disc pathology, stenosis, and nerve impingement as invasive management may be warranted.  After MRI he is to follow-up with pain management to discuss further treatment options.          Subjective:   Patient ID: Antonio Mckeon is a 53 y.o. male.  Chief Complaint   Patient presents with    Lower Back - Pain        53-year-old male presents for worsening of low back pain many years duration.  He denies trauma or inciting event.  He has pain described as localized to the lumbosacral aspect of spine and radiating to the buttocks on both sides, constant, worse with activity particularly prolonged standing or prolonged sitting, worse with bending twisting, associate with limited range of motion,  "and improved with resting.  He was seen by Dr. Burger more than 2 years ago and advised on conservative management.  He was referred to formal therapy.  He attended aqua therapy and was advised on home exercises.  He utilized the pool facility for PT directed exercises 3 times weekly for 2 months, and since then has continued with home exercise program.  He was also on meloxicam 15 mg once daily for more than 1 year.  He recently experienced worsening of symptoms over the past 1 month.  He had follow-up with his cardiologist and was prescribed Celebrex 20 mg to be taken every other day as needed and he was advised to follow-up with orthopedic care.    Back Pain  This is a chronic problem. The current episode started more than 1 year ago. The problem occurs daily. The problem has been gradually worsening. Associated symptoms include arthralgias. Pertinent negatives include no joint swelling, numbness or weakness. The symptoms are aggravated by standing, walking, twisting and bending. He has tried rest, position changes and NSAIDs (Physical therapy, home exercise) for the symptoms. The treatment provided mild relief.           The following portions of the patient's history were reviewed and updated as appropriate: He  has a past medical history of Heart disease and Hypertension.  He has No Known Allergies..    Review of Systems   Musculoskeletal:  Positive for arthralgias and back pain. Negative for joint swelling.   Neurological:  Negative for weakness and numbness.       Objective:  Vitals:    08/13/24 1318   BP: 140/85   Pulse: 60   Weight: 106 kg (234 lb)   Height: 5' 11\" (1.803 m)      Right Ankle Exam     Muscle Strength   Dorsiflexion:  5/5  Plantar flexion:  5/5       Left Ankle Exam     Muscle Strength   Dorsiflexion:  5/5   Plantar flexion:  5/5       Right Hip Exam     Muscle Strength   Flexion: 5/5     Tests   MAIXME: negative    Comments:  Negative FADDIR  Having tightness      Left Hip Exam     Muscle " Strength   Flexion: 5/5     Tests   MAXIME: negative    Comments:  Negative FADDIR  Having tightness      Back Exam     Tenderness   The patient is experiencing tenderness in the lumbar and sacroiliac.    Range of Motion   Extension:  abnormal   Flexion:  normal   Lateral bend right:  abnormal   Lateral bend left:  abnormal   Rotation right:  abnormal   Rotation left:  abnormal     Muscle Strength   Right Quadriceps:  5/5   Left Quadriceps:  5/5     Tests   Straight leg raise right: negative  Straight leg raise left: negative    Other   Sensation: normal          Strength/Myotome Testing     Left Ankle/Foot   Dorsiflexion: 5  Plantar flexion: 5    Right Ankle/Foot   Dorsiflexion: 5  Plantar flexion: 5      Physical Exam  Vitals and nursing note reviewed.   Constitutional:       Appearance: Normal appearance. He is well-developed. He is not ill-appearing or diaphoretic.   HENT:      Head: Normocephalic and atraumatic.      Right Ear: External ear normal.      Left Ear: External ear normal.   Eyes:      Conjunctiva/sclera: Conjunctivae normal.   Neck:      Trachea: No tracheal deviation.   Cardiovascular:      Rate and Rhythm: Normal rate.   Pulmonary:      Effort: Pulmonary effort is normal. No respiratory distress.   Abdominal:      General: There is no distension.   Musculoskeletal:         General: Tenderness present.      Lumbar back: Negative right straight leg raise test and negative left straight leg raise test.   Skin:     General: Skin is warm and dry.      Coloration: Skin is not jaundiced or pale.   Neurological:      Mental Status: He is alert and oriented to person, place, and time.   Psychiatric:         Mood and Affect: Mood normal.         Behavior: Behavior normal.         Thought Content: Thought content normal.         Judgment: Judgment normal.         I have personally reviewed pertinent films in PACS and my interpretation is  .  X-rays lumbar spine noted for multilevel facet degenerative changes  and mild disc space narrowing.  There is slight anterolisthesis of L4 on L5.

## 2024-08-14 ENCOUNTER — TELEPHONE (OUTPATIENT)
Age: 54
End: 2024-08-14

## 2024-08-14 NOTE — TELEPHONE ENCOUNTER
Reviewed chart. Med admission is scheduled for 9/25/24. Med will be sent post discharge for the appropriate dose to pharmacy.     Called [facility] and spoke to Emy. Notified of above info. She understood and noted the pt's account.

## 2024-08-14 NOTE — TELEPHONE ENCOUNTER
Pharmacy called the RX Refill Line. Message is being forwarded to the office.     Pharmacy received the PA approval for Dofetilide 500mcg 1 PO BID  #60.  They are asking for a script to be sent.  I do not see it on his med list.    If appropriate, please send script to [facility]    Please contact patient at  [phone]

## 2024-08-21 ENCOUNTER — TELEPHONE (OUTPATIENT)
Age: 54
End: 2024-08-21

## 2024-08-21 NOTE — TELEPHONE ENCOUNTER
Caller: Antonio    Doctor: Butch    Reason for call: Patient's insurance denied coverage for the MRI. The insurance is requesting he do PT first.   Please advise the patient  Thank you    Call back#: [phone]

## 2024-08-23 ENCOUNTER — OFFICE VISIT (OUTPATIENT)
Dept: OBGYN CLINIC | Facility: CLINIC | Age: 54
End: 2024-08-23
Payer: COMMERCIAL

## 2024-08-23 ENCOUNTER — APPOINTMENT (OUTPATIENT)
Dept: RADIOLOGY | Facility: CLINIC | Age: 54
End: 2024-08-23
Payer: COMMERCIAL

## 2024-08-23 VITALS
BODY MASS INDEX: 32.9 KG/M2 | SYSTOLIC BLOOD PRESSURE: 142 MMHG | HEART RATE: 69 BPM | WEIGHT: 235 LBS | HEIGHT: 71 IN | DIASTOLIC BLOOD PRESSURE: 86 MMHG

## 2024-08-23 DIAGNOSIS — M25.511 RIGHT SHOULDER PAIN, UNSPECIFIED CHRONICITY: ICD-10-CM

## 2024-08-23 DIAGNOSIS — M19.011 PRIMARY OSTEOARTHRITIS OF RIGHT SHOULDER: ICD-10-CM

## 2024-08-23 DIAGNOSIS — M25.511 RIGHT SHOULDER PAIN, UNSPECIFIED CHRONICITY: Primary | ICD-10-CM

## 2024-08-23 PROCEDURE — 73030 X-RAY EXAM OF SHOULDER: CPT

## 2024-08-23 PROCEDURE — 20610 DRAIN/INJ JOINT/BURSA W/O US: CPT | Performed by: ORTHOPAEDIC SURGERY

## 2024-08-23 PROCEDURE — 99214 OFFICE O/P EST MOD 30 MIN: CPT | Performed by: ORTHOPAEDIC SURGERY

## 2024-08-23 RX ORDER — BUPIVACAINE HYDROCHLORIDE 2.5 MG/ML
2 INJECTION, SOLUTION INFILTRATION; PERINEURAL
Status: COMPLETED | OUTPATIENT
Start: 2024-08-23 | End: 2024-08-23

## 2024-08-23 RX ORDER — METHYLPREDNISOLONE ACETATE 40 MG/ML
2 INJECTION, SUSPENSION INTRA-ARTICULAR; INTRALESIONAL; INTRAMUSCULAR; SOFT TISSUE
Status: COMPLETED | OUTPATIENT
Start: 2024-08-23 | End: 2024-08-23

## 2024-08-23 RX ORDER — LIDOCAINE HYDROCHLORIDE 10 MG/ML
2 INJECTION, SOLUTION INFILTRATION; PERINEURAL
Status: COMPLETED | OUTPATIENT
Start: 2024-08-23 | End: 2024-08-23

## 2024-08-23 RX ADMIN — LIDOCAINE HYDROCHLORIDE 2 ML: 10 INJECTION, SOLUTION INFILTRATION; PERINEURAL at 07:45

## 2024-08-23 RX ADMIN — BUPIVACAINE HYDROCHLORIDE 2 ML: 2.5 INJECTION, SOLUTION INFILTRATION; PERINEURAL at 07:45

## 2024-08-23 RX ADMIN — METHYLPREDNISOLONE ACETATE 2 ML: 40 INJECTION, SUSPENSION INTRA-ARTICULAR; INTRALESIONAL; INTRAMUSCULAR; SOFT TISSUE at 07:45

## 2024-08-23 NOTE — PROGRESS NOTES
Patient Name:  Antonio Mckeon  MRN:  [id number]    Assessment & Plan     1. Right shoulder pain, unspecified chronicity  -     XR shoulder 2+ vw right; Future; Expected date: 08/23/2024  -     Ambulatory Referral to Physical Therapy; Future  -     Large joint arthrocentesis: R glenohumeral  2. Primary osteoarthritis of right shoulder  -     Ambulatory Referral to Physical Therapy; Future  -     Large joint arthrocentesis: R glenohumeral      Right shoulder glenohumeral osteoarthritis with worsening pain and range of motion.   X-rays reviewed in office today with patient  Treatment options were discussed including corticosteroid injection, outpatient physical therapy  He was referred to outpatient physical therapy   The patient was offered a corticosteroid injection for their right glenohumeral joint. They tolerated the procedure well.    The patient was educated they may have some irritation in the next few days and should rest, ice, elevate and perform gentle range of motion exercises. They were advised the medicine should begin to work in a few days time.    The patient was informed corticosteroid injections can be repeated at the earliest every 3 months.   Follow up 3 months    Chief Complaint     Right shoulder pain    History of the Present Illness     Antonio Mckeon is a RHD 53 y.o. male with Right shoulder pain ongoing for approximately two year without injury. He works as a donald. He denies numbness and tingling. The patient describes sharp pain while sleeping on his right side. He has restricted overhead motion for about a year. He does not take any oral analgesics. He has not tried physical therapy or injections.    Review of Systems     Review of Systems   Constitutional:  Negative for chills and fever.   HENT:  Negative for ear pain and sore throat.    Eyes:  Negative for pain and visual disturbance.   Respiratory:  Negative for cough and shortness of breath.    Cardiovascular:  Negative for chest pain  "and palpitations.   Gastrointestinal:  Negative for abdominal pain and vomiting.   Genitourinary:  Negative for dysuria and hematuria.   Musculoskeletal:  Negative for arthralgias and back pain.   Skin:  Negative for color change and rash.   Neurological:  Negative for seizures and syncope.   All other systems reviewed and are negative.      Physical Exam     /86   Pulse 69   Ht 5' 11\" (1.803 m)   Wt 107 kg (235 lb)   BMI 32.78 kg/m²     Right Shoulder:   Active range of motion   120 degrees forward flexion  120 degrees abduction  50 degrees external rotation   SI joint internal rotation    Passive range of motion   140 degrees of forward flexion     There is no tenderness present over the shoulder.   Forward flexion testing 5/5  External rotation testing 5/5  Internal rotation testing 5/5  The patient is neurovascularly intact distally in the extremity.      Eyes:  Anicteric sclerae.  Neck:  Supple.  Lungs:  Normal respiratory effort.  Cardiovascular:  Capillary refill is less than 2 seconds.  Skin:  Intact without erythema.  Neurologic:  Sensation grossly intact to light touch.  Psychiatric:  Mood and affect are appropriate.    Data Review     I have personally reviewed pertinent films in PACS, and my interpretation follows:    X-rays taken 8/23/2024 of Right shoulder demonstrate orthopedic hardware intact without evidence mild glenohumeral joint space narrowing with inferior osteophyte. No acute fracture or dislocation.    Past Medical History:   Diagnosis Date    Heart disease     Hypertension        Past Surgical History:   Procedure Laterality Date    CARDIAC CATHETERIZATION N/A 2010    CARDIAC CATHETERIZATION Left 6/3/2024    Procedure: Cardiac catheterization;  Surgeon: Rosalinda Novoa MD;  Location: [facility];  Service: Cardiology    CARDIAC CATHETERIZATION N/A 6/3/2024    Procedure: Cardiac Coronary Angiogram;  Surgeon: Rosalinda Novoa MD;  Location: [facility];  Service: " "Cardiology    KNEE ARTHROSCOPY W/ MENISCAL REPAIR Bilateral     ROTATOR CUFF REPAIR Left        No Known Allergies    Current Outpatient Medications on File Prior to Visit   Medication Sig Dispense Refill    Eliquis 5 MG TAKE 1 TABLET(5 MG) BY MOUTH TWICE DAILY 60 tablet 11    lisinopril (ZESTRIL) 5 mg tablet Take 1 tablet (5 mg total) by mouth daily 90 tablet 3    metoprolol succinate (TOPROL-XL) 100 mg 24 hr tablet Take 1 tablet (100 mg total) by mouth daily 90 tablet 3    ALPRAZolam (XANAX) 0.5 mg tablet Take 1 tablet (0.5 mg total) by mouth 30 min pre-procedure Take 2nd dose 30 mins after 1st dose if still anxious (Patient not taking: Reported on 8/23/2024) 2 tablet 0    celecoxib (CeleBREX) 200 mg capsule Take 1 capsule (200 mg total) by mouth daily (Patient not taking: Reported on 8/13/2024) 30 capsule 3     No current facility-administered medications on file prior to visit.       Social History     Tobacco Use    Smoking status: Never    Smokeless tobacco: Never   Vaping Use    Vaping status: Never Used   Substance Use Topics    Alcohol use: Yes     Comment: sobor x1 mo 5/2022    Drug use: No       Family History   Problem Relation Age of Onset    Diabetes Mother     Heart disease Mother     Rheum arthritis Mother     Alcohol abuse Father     Stomach cancer Father     Breast cancer Maternal Grandmother              Procedures Performed     Large joint arthrocentesis: R glenohumeral  [place] Protocol:  Consent: Verbal consent obtained.  Risks and benefits: risks, benefits and alternatives were discussed  Consent given by: patient  Time out: Immediately prior to procedure a \"time out\" was called to verify the correct patient, procedure, equipment, support staff and site/side marked as required.  Patient understanding: patient states understanding of the procedure being performed  Site marked: the operative site was marked  Patient identity confirmed: verbally with patient  Supporting " Documentation  Indications: pain   Procedure Details  Location: shoulder - R glenohumeral  Preparation: Patient was prepped and draped in the usual sterile fashion  Needle size: 22 G  Ultrasound guidance: no  Approach: anterolateral  Medications administered: 2 mL bupivacaine 0.25 %; 2 mL methylPREDNISolone acetate 40 mg/mL; 2 mL lidocaine 1 %    Patient tolerance: patient tolerated the procedure well with no immediate complications  Dressing:  Sterile dressing applied            Dania Etienne  Scribe Attestation      I,:  Dania Etienne am acting as a scribe while in the presence of the attending physician.:       I,:  Roland Flores DO personally performed the services described in this documentation    as scribed in my presence.:

## 2024-08-27 DIAGNOSIS — M51.9 LUMBAR DISC DISEASE: Primary | ICD-10-CM

## 2024-08-27 DIAGNOSIS — M47.816 LUMBAR FACET ARTHROPATHY: ICD-10-CM

## 2024-08-27 DIAGNOSIS — M43.16 SPONDYLOLISTHESIS OF LUMBAR REGION: ICD-10-CM

## 2024-08-27 DIAGNOSIS — M53.3 SI (SACROILIAC) JOINT DYSFUNCTION: ICD-10-CM

## 2024-08-27 NOTE — TELEPHONE ENCOUNTER
Called and notified pt of Dr Rae' advisement. Informed pt he should be getting call sometime this week to schedule PT.

## 2024-08-29 ENCOUNTER — HOSPITAL ENCOUNTER (OUTPATIENT)
Dept: CT IMAGING | Facility: CLINIC | Age: 54
Discharge: HOME | End: 2024-08-29
Payer: COMMERCIAL

## 2024-08-29 DIAGNOSIS — I48.91 NEW ONSET ATRIAL FIBRILLATION (HCC): ICD-10-CM

## 2024-08-29 DIAGNOSIS — I48.11 LONGSTANDING PERSISTENT ATRIAL FIBRILLATION (HCC): ICD-10-CM

## 2024-08-29 PROCEDURE — 75572 CT HRT W/3D IMAGE: CPT

## 2024-08-29 RX ORDER — IODIXANOL 320 MG/ML
100 INJECTION, SOLUTION INTRAVASCULAR
Status: COMPLETED | OUTPATIENT
Start: 2024-08-29 | End: 2024-08-29

## 2024-08-29 RX ADMIN — IODIXANOL 100 ML: 320 INJECTION, SOLUTION INTRAVASCULAR at 12:39

## 2024-08-30 ENCOUNTER — HOSPITAL ENCOUNTER (OUTPATIENT)
Dept: NON INVASIVE DIAGNOSTICS | Facility: CLINIC | Age: 54
Discharge: HOME | End: 2024-08-30
Payer: COMMERCIAL

## 2024-08-30 ENCOUNTER — TELEPHONE (OUTPATIENT)
Age: 54
End: 2024-08-30

## 2024-08-30 VITALS
HEART RATE: 69 BPM | WEIGHT: 235 LBS | HEIGHT: 71 IN | SYSTOLIC BLOOD PRESSURE: 142 MMHG | BODY MASS INDEX: 32.9 KG/M2 | DIASTOLIC BLOOD PRESSURE: 86 MMHG

## 2024-08-30 DIAGNOSIS — I50.20 HFREF (HEART FAILURE WITH REDUCED EJECTION FRACTION) (HCC): ICD-10-CM

## 2024-08-30 LAB
AORTIC ROOT: 3.6 CM
APICAL FOUR CHAMBER EJECTION FRACTION: 53 %
BSA FOR ECHO PROCEDURE: 2.26 M2
FRACTIONAL SHORTENING: 26 (ref 28–44)
INTERVENTRICULAR SEPTUM IN DIASTOLE (PARASTERNAL SHORT AXIS VIEW): 1.2 CM
INTERVENTRICULAR SEPTUM: 1.2 CM (ref 0.6–1.1)
LAAS-AP2: 24.8 CM2
LAAS-AP4: 25.1 CM2
LEFT ATRIUM SIZE: 4.4 CM
LEFT ATRIUM VOLUME (MOD BIPLANE): 83 ML
LEFT ATRIUM VOLUME INDEX (MOD BIPLANE): 36.7 ML/M2
LEFT INTERNAL DIMENSION IN SYSTOLE: 4.3 CM (ref 2.1–4)
LEFT VENTRICULAR INTERNAL DIMENSION IN DIASTOLE: 5.8 CM (ref 3.5–6)
LEFT VENTRICULAR POSTERIOR WALL IN END DIASTOLE: 1 CM
LEFT VENTRICULAR STROKE VOLUME: 82 ML
LVSV (TEICH): 82 ML
RIGHT ATRIUM AREA SYSTOLE A4C: 19.3 CM2
RIGHT VENTRICLE ID DIMENSION: 3.9 CM
SL CV LEFT ATRIUM LENGTH A2C: 5.6 CM
SL CV PED ECHO LEFT VENTRICLE DIASTOLIC VOLUME (MOD BIPLANE) 2D: 164 ML
SL CV PED ECHO LEFT VENTRICLE SYSTOLIC VOLUME (MOD BIPLANE) 2D: 81 ML
TRICUSPID ANNULAR PLANE SYSTOLIC EXCURSION: 2.2 CM

## 2024-08-30 PROCEDURE — 93308 TTE F-UP OR LMTD: CPT | Performed by: INTERNAL MEDICINE

## 2024-08-30 PROCEDURE — 93308 TTE F-UP OR LMTD: CPT

## 2024-08-30 PROCEDURE — 93321 DOPPLER ECHO F-UP/LMTD STD: CPT

## 2024-08-30 PROCEDURE — 93325 DOPPLER ECHO COLOR FLOW MAPG: CPT | Performed by: INTERNAL MEDICINE

## 2024-08-30 PROCEDURE — 93325 DOPPLER ECHO COLOR FLOW MAPG: CPT

## 2024-08-30 PROCEDURE — 93321 DOPPLER ECHO F-UP/LMTD STD: CPT | Performed by: INTERNAL MEDICINE

## 2024-08-30 NOTE — TELEPHONE ENCOUNTER
Caller: Antonio     Doctor: Sarabjit     Reason for call: Please mail new patient paperwork to address on file. Thank you     Call back#: [phone]

## 2024-09-02 ENCOUNTER — TELEPHONE (OUTPATIENT)
Dept: OTHER | Facility: OTHER | Age: 54
End: 2024-09-02

## 2024-09-02 NOTE — TELEPHONE ENCOUNTER
"Pt stated, \" I received a call from this number and I am trying to see what it was about.\"    No notes found in pt's chart pt did want to confirm his appointment on 9/4/2024.      Please follow up if needed, thank you!   "

## 2024-09-02 NOTE — PROGRESS NOTES
Cardiology Office Note    Antonio Mckeon 53 y.o. male MRN: [id number]    09/04/24          Assessment/Plan:    1.  Nonischemic cardiomyopathy with a recovered EF of 53%/chronic HFimpEF  -Patient currently euvolemic  - Continue as needed furosemide for weight gain of 3 pounds in 1 day or 5 pounds in 1 week  - Continue lisinopril and metoprolol succinate  -Continue low-sodium diet and daily weights  -Repeat limited TTE revealed improved EF to 53% (previously 40%)     2.  Persistent atrial fibrillation  - Heart rates are currently controlled in the 80s  - Continue metoprolol succinate 100 mg daily for rate control  - Continue Eliquis for anticoagulation ([id number] score of 2)  - Has followed up with EP and plan for ablation, antiarrhythmic initiation (Tikosyn or sotalol) and loop implantation     3.  Hypertension  - Blood pressure remains stable  - Continue lisinopril and metoprolol succinate  - Lisinopril decreased to 5 mg daily  - Continue ambulatory blood pressure monitoring and low-sodium diet     4.  History of rheumatoid arthritis    Follow up: 4 months or sooner as needed    1. Dilated cardiomyopathy (HCC)  furosemide (LASIX) 20 mg tablet      2. Persistent atrial fibrillation (HCC)        3. Primary hypertension        4. Rheumatoid arthritis of multiple sites with negative rheumatoid factor (HCC)            HPI: Antonio Mckeon is a 53 y.o. year old male with a past medical history of nonischemic cardiomyopathy with recovered EF 53%, persistent atrial fibrillation, hypertension and RA who presents for routine follow-up.  He was last seen in this office in July 2024 and at that time was doing well from cardiac standpoint.  He recently had a repeat limited TTE which showed improved EF from 40% to 53%.  He reports that he is doing very well and has lost a significant amount of weight.  He continues to follow low-sodium diet and to weigh himself daily.  He also followed up with electrophysiology who plans to do a  A-fib ablation with initiation of antiarrhythmic therapy as well as a loop recorder implant and this is scheduled for September 25.  He patient denies chest pain, dyspnea on exertion or rest, lower extremity edema, lightheadedness, dizziness, syncopal episodes, or palpitations and was instructed to call  the office or seek medical attention if any such symptoms develop.     All medications reviewed and patient is tolerating medications without side effects. Refills were sent if needed.       Patient Active Problem List   Diagnosis    Pain in both hands    Encounter for support and coordination of transition of care    Rheumatoid arthritis of multiple sites with negative rheumatoid factor (HCC)    Primary hypertension    Intermittent palpitations    Cardiomyopathy (HCC)    Atrial fibrillation (HCC)    Acute right-sided low back pain with sciatica       No Known Allergies      Current Outpatient Medications:     Eliquis 5 MG, TAKE 1 TABLET(5 MG) BY MOUTH TWICE DAILY, Disp: 60 tablet, Rfl: 11    furosemide (LASIX) 20 mg tablet, Take 1 tablet (20 mg total) by mouth daily as needed (for weight gain of 3 lbs), Disp: 30 tablet, Rfl: 2    lisinopril (ZESTRIL) 5 mg tablet, Take 1 tablet (5 mg total) by mouth daily, Disp: 90 tablet, Rfl: 3    metoprolol succinate (TOPROL-XL) 100 mg 24 hr tablet, Take 1 tablet (100 mg total) by mouth daily, Disp: 90 tablet, Rfl: 3    Past Medical History:   Diagnosis Date    Heart disease     Hypertension        Family History   Problem Relation Age of Onset    Diabetes Mother     Heart disease Mother     Rheum arthritis Mother     Alcohol abuse Father     Stomach cancer Father     Breast cancer Maternal Grandmother        Past Surgical History:   Procedure Laterality Date    CARDIAC CATHETERIZATION N/A 2010    CARDIAC CATHETERIZATION Left 6/3/2024    Procedure: Cardiac catheterization;  Surgeon: Rosalinda Novoa MD;  Location: [facility];  Service: Cardiology    CARDIAC  CATHETERIZATION N/A 6/3/2024    Procedure: Cardiac Coronary Angiogram;  Surgeon: Rosalinda Novoa MD;  Location: [facility];  Service: Cardiology    KNEE ARTHROSCOPY W/ MENISCAL REPAIR Bilateral     ROTATOR CUFF REPAIR Left        Social History     Socioeconomic History    Marital status: Single     Spouse name: Not on file    Number of children: Not on file    Years of education: Not on file    Highest education level: Not on file   Occupational History    Not on file   Tobacco Use    Smoking status: Never    Smokeless tobacco: Never   Vaping Use    Vaping status: Never Used   Substance and Sexual Activity    Alcohol use: Yes     Comment: sobor x1 mo 5/2022    Drug use: No    Sexual activity: Yes     Partners: Female   Other Topics Concern    Not on file   Social History Narrative    Not on file     Social Determinants of Health     Financial Resource Strain: Not on file   Food Insecurity: No Food Insecurity (5/30/2024)    Hunger Vital Sign     Worried About Running Out of Food in the Last Year: Never true     Ran Out of Food in the Last Year: Never true   Transportation Needs: No Transportation Needs (5/30/2024)    PRAPARE - Transportation     Lack of Transportation (Medical): No     Lack of Transportation (Non-Medical): No   Physical Activity: Not on file   Stress: Not on file   Social Connections: Not on file   Intimate Partner Violence: Not on file   Housing Stability: Low Risk  (5/30/2024)    Housing Stability Vital Sign     Unable to Pay for Housing in the Last Year: No     Number of Times Moved in the Last Year: 0     Homeless in the Last Year: No       Review of symptoms:   Constitution:  Negative  HEENT:  Negative  Cardiovascular:  Negative  Respiratory:  Negative  Skin:  Negative  Gastrointestinal:  Negative  Genitourinary:  Negative  Musculoskeletal:  Negative  Neurological:  Negative  Endocrine:  Negative  Psychological:  Negative    Vitals: /88 (BP Location: Left arm, Patient Position:  "Sitting, Cuff Size: Standard)   Pulse 85   Resp 16   Ht 5' 11\" (1.803 m)   Wt 105 kg (231 lb)   SpO2 100%   BMI 32.22 kg/m²         Physical Exam:     GEN: Alert and oriented x 3, in no acute distress.  Well appearing and well nourished.   HEENT: Sclera anicteric, conjunctivae pink, mucous membranes moist.   NECK: Supple, no carotid bruits, no significant JVD. Trachea midline.  HEART: Regular rhythm, normal S1 and S2, no murmurs, clicks, gallops or rubs.   LUNGS: Clear to auscultation bilaterally; no wheezes, rales, or rhonchi. No increased work of breathing or signs of respiratory distress.   ABDOMEN: Soft, nontender, nondistended, normoactive bowel sounds.   EXTREMITIES: Skin warm and well perfused, no clubbing, cyanosis, or edema.  NEURO: No focal findings. Normal speech. Mood and affect normal.   SKIN: Normal without suspicious lesions on exposed skin.    "

## 2024-09-04 ENCOUNTER — OFFICE VISIT (OUTPATIENT)
Dept: CARDIOLOGY CLINIC | Facility: CLINIC | Age: 54
End: 2024-09-04
Payer: COMMERCIAL

## 2024-09-04 VITALS
HEIGHT: 71 IN | SYSTOLIC BLOOD PRESSURE: 130 MMHG | HEART RATE: 85 BPM | WEIGHT: 231 LBS | BODY MASS INDEX: 32.34 KG/M2 | OXYGEN SATURATION: 100 % | DIASTOLIC BLOOD PRESSURE: 88 MMHG | RESPIRATION RATE: 16 BRPM

## 2024-09-04 DIAGNOSIS — I10 PRIMARY HYPERTENSION: ICD-10-CM

## 2024-09-04 DIAGNOSIS — I42.0 DILATED CARDIOMYOPATHY (HCC): Primary | ICD-10-CM

## 2024-09-04 DIAGNOSIS — I48.19 PERSISTENT ATRIAL FIBRILLATION (HCC): ICD-10-CM

## 2024-09-04 DIAGNOSIS — M06.09 RHEUMATOID ARTHRITIS OF MULTIPLE SITES WITH NEGATIVE RHEUMATOID FACTOR (HCC): ICD-10-CM

## 2024-09-04 LAB
AORTIC ROOT: 3.6 CM
APICAL FOUR CHAMBER EJECTION FRACTION: 53 %
BSA FOR ECHO PROCEDURE: 2.26 M2
FRACTIONAL SHORTENING: 26 (ref 28–44)
INTERVENTRICULAR SEPTUM IN DIASTOLE (PARASTERNAL SHORT AXIS VIEW): 1.2 CM
INTERVENTRICULAR SEPTUM: 1.2 CM (ref 0.6–1.1)
LAAS-AP2: 24.8 CM2
LAAS-AP4: 25.1 CM2
LEFT ATRIUM SIZE: 4.4 CM
LEFT ATRIUM VOLUME (MOD BIPLANE): 83 ML
LEFT ATRIUM VOLUME INDEX (MOD BIPLANE): 36.7 ML/M2
LEFT INTERNAL DIMENSION IN SYSTOLE: 4.3 CM (ref 2.1–4)
LEFT VENTRICULAR INTERNAL DIMENSION IN DIASTOLE: 5.8 CM (ref 3.5–6)
LEFT VENTRICULAR POSTERIOR WALL IN END DIASTOLE: 1 CM
LEFT VENTRICULAR STROKE VOLUME: 82 ML
LVSV (TEICH): 82 ML
RIGHT ATRIUM AREA SYSTOLE A4C: 19.3 CM2
RIGHT VENTRICLE ID DIMENSION: 3.9 CM
SL CV LEFT ATRIUM LENGTH A2C: 5.6 CM
SL CV LV EF: 50
SL CV PED ECHO LEFT VENTRICLE DIASTOLIC VOLUME (MOD BIPLANE) 2D: 164 ML
SL CV PED ECHO LEFT VENTRICLE SYSTOLIC VOLUME (MOD BIPLANE) 2D: 81 ML
TRICUSPID ANNULAR PLANE SYSTOLIC EXCURSION: 2.2 CM

## 2024-09-04 PROCEDURE — 99213 OFFICE O/P EST LOW 20 MIN: CPT | Performed by: NURSE PRACTITIONER

## 2024-09-04 RX ORDER — FUROSEMIDE 20 MG
20 TABLET ORAL DAILY PRN
Qty: 30 TABLET | Refills: 2 | Status: SHIPPED | OUTPATIENT
Start: 2024-09-04

## 2024-09-10 ENCOUNTER — EVALUATION (OUTPATIENT)
Dept: PHYSICAL THERAPY | Facility: CLINIC | Age: 54
End: 2024-09-10
Payer: COMMERCIAL

## 2024-09-10 DIAGNOSIS — M25.511 RIGHT SHOULDER PAIN, UNSPECIFIED CHRONICITY: Primary | ICD-10-CM

## 2024-09-10 DIAGNOSIS — M19.011 PRIMARY OSTEOARTHRITIS OF RIGHT SHOULDER: ICD-10-CM

## 2024-09-10 PROCEDURE — 97162 PT EVAL MOD COMPLEX 30 MIN: CPT

## 2024-09-10 PROCEDURE — 97110 THERAPEUTIC EXERCISES: CPT

## 2024-09-10 NOTE — PROGRESS NOTES
PT Evaluation     Today's date: 9/10/2024  Patient name: Antonio Mckeon  : 1970  MRN: [id number]  Referring provider: Roland Flores DO  Dx:   Encounter Diagnosis     ICD-10-CM    1. Right shoulder pain, unspecified chronicity  M25.511 Ambulatory Referral to Physical Therapy      2. Primary osteoarthritis of right shoulder  M19.011 Ambulatory Referral to Physical Therapy          Start Time: 845  Stop Time: 924  Total time in clinic (min): 39 minutes    Assessment  Impairments: abnormal or restricted ROM, activity intolerance, impaired physical strength, lacks appropriate home exercise program, pain with function, scapular dyskinesis, poor posture , unable to perform ADL, participation limitations, activity limitations and endurance  Symptom irritability: high    Assessment details: Patient is a pleasant 53 y.o. male who presents to physical therapy with main reports of chronic R shoulder pain.    No further referral appears necessary at this time based upon examination results.    Primary movement impairment diagnosis of GH hypomobility and parascapular weakness resulting in pathoanatomical symptoms of decreased ROM, decreased strength, and increased symptom irritability. This limits Antonio's ability to reach overhead and lift objects overhead w/o significant pain.     Prognosis is good given HEP compliance and PT 2 times per week over the next 12 weeks.  Positive prognostic indicators include positive attitude toward recovery.  Negative prognostic indicators include chronic pain and upcoming surgery for Afib.    Please contact me if you have any questions.  Thank you for the opportunity to share in Antonio Mckeon care.       Prognosis: good    Goals  Short term:  Pt will be independent w/ individualized HEP  Pt will have a decrease in symptom irritability by 2 points     Long term:  Pt will be able to reach fully overhead and touch top of doorway w/ minimal to no symptom irritability.   Pt will be  able to lift 10 pounds overhead w/ minimal to no symptom irritability.   Pt will be able to throw a football and baseball w/ minimal to no symptom irritability.   Pt will improve FOTO by [facility]      Plan  Patient would benefit from: skilled physical therapy  Referral necessary: No    Planned therapy interventions: abdominal trunk stabilization, IASTM, joint mobilization, manual therapy, massage, nerve gliding, neuromuscular re-education, patient/caregiver education, postural training, stretching, therapeutic activities, strengthening, therapeutic training, therapeutic exercise, home exercise program, activity modification, body mechanics training, functional ROM exercises and flexibility    Frequency: 2x week  Plan of Care beginning date: 9/10/2024  Plan of Care expiration date: 12/3/2024  Treatment plan discussed with: patient        Subjective Evaluation    History of Present Illness  Mechanism of injury: Pt reports to outpatient PT following onset of chronic R shoulder pain that started after being unable to perform activity for three months due to illness in .   R shoulder pain for over a year, pain surrounds the entire shoulder   Limited in overhead activity and lifting, pain only during activity   Pt just received a Cortizone shot a few weeks ago and it made no noticeable difference   Pt reports that he occasionally gets a sharp pain in anterior shoulder.   Pt reports no radicular symptoms   No red flags, history of congestive heart failure-  Afib surgery           Recurrent probem    Quality of life: fair    Patient Goals  Patient goals for therapy: decreased pain, increased motion, increased strength, independence with ADLs/IADLs and return to sport/leisure activities  Patient goal: Return to throwing a ball  Pain  Current pain ratin  At best pain ratin  At worst pain ratin  Quality: tight, throbbing, pressure and discomfort  Relieving factors: relaxation and rest  Aggravating  factors: lifting and overhead activity  Progression: no change    Social Support    Employment status: working (construction)  Hand dominance: right    Treatments  Previous treatment: injection treatment        Objective     Postural Observations  Seated posture: poor  Standing posture: fair      Neurological Testing     Sensation   Cervical/Thoracic   Left   Intact: light touch    Right   Intact: light touch    Active Range of Motion   Cervical/Thoracic Spine       Cervical  Subcranial protraction:  WFL   Subcranial retraction:  WFL   Flexion:  WFL  Extension:  with pain Restriction level: minimal  Left lateral flexion:  WFL  Right lateral flexion:  WFL  Left rotation:  WFL  Right rotation:  WFL    Thoracic    Flexion:  WFL  Extension:  Restriction level: minimal  Left lateral flexion:  WFL  Right lateral flexion:  WFL  Left rotation:  WFL  Right rotation:  WFL  Left Shoulder   Normal active range of motion    Right Shoulder   Flexion: 120 degrees with pain  Extension: WFL  Abduction: 80 degrees with pain  External rotation 0°: WFL  External rotation 45°: WFL  External rotation 90°: with pain  Internal rotation 0°: WFL  Internal rotation 45°: WFL  Internal rotation 90°: with pain    Passive Range of Motion   Left Shoulder   Normal passive range of motion    Right Shoulder   Flexion: 140 (pain guarded) degrees with pain  Abduction: 80 degrees with pain  External rotation 0°: 65 degrees   Internal rotation 0°: 50 degrees     Scapular Mobility     Right Shoulder   Scapular mobility: fair    Joint Play     Right Shoulder  Hypomobile in the anterior capsule, posterior capsule, inferior capsule and thoracic spine.     Strength/Myotome Testing     Left Shoulder     Planes of Motion   Flexion: 4+   Extension: 4+   Abduction: 4+   External rotation at 0°: 4+   Internal rotation at 0°: 4+     Isolated Muscles   Lower trapezius: 4   Middle trapezius: 4   Serratus anterior: 4   Upper trapezius: 5     Right Shoulder     Planes  of Motion   Flexion: 4-   Extension: 3+   Abduction: 4-   External rotation at 0°: 4   Internal rotation at 0°: 4+     Isolated Muscles   Lower trapezius: 3-   Middle trapezius: 3-   Serratus anterior: 3+   Upper trapezius: 5     Left Elbow   Flexion: 5  Extension: 5    Right Elbow   Flexion: 5  Extension: 5    Left Wrist/Hand   Wrist extension: 5  Wrist flexion: 5  Radial deviation: 5  Ulnar deviation: 5  Thumb extension: 5    Right Wrist/Hand   Wrist extension: 5  Wrist flexion: 5  Radial deviation: 5  Ulnar deviation: 5  Thumb extension: 5    Tests     Right Shoulder   Positive belly press, empty can, Hawkin's, lift-off, Neer's and painful arc.   Negative drop arm, external rotation lag sign and internal rotation lag sign.              Precautions: Afib, Future surgery cardiology     POC expires Unit limit Auth Expiration date PT/OT/ST + Visit Limit?   12/3/24 BOMN PEND BOMN                           Visit/Unit Tracking  AUTH Status:  Date 9/10              Yes- 24 Used 1               Remaining                  HEP:  [id number]   Manuals 9/10                                                                Neuro Re-Ed                                                                                                        Ther Ex             HEP education 8'                                                                                                       Ther Activity                                       Gait Training                                       Modalities

## 2024-09-16 ENCOUNTER — OFFICE VISIT (OUTPATIENT)
Dept: PHYSICAL THERAPY | Facility: CLINIC | Age: 54
End: 2024-09-16
Payer: COMMERCIAL

## 2024-09-16 DIAGNOSIS — M25.511 RIGHT SHOULDER PAIN, UNSPECIFIED CHRONICITY: Primary | ICD-10-CM

## 2024-09-16 DIAGNOSIS — M19.011 PRIMARY OSTEOARTHRITIS OF RIGHT SHOULDER: ICD-10-CM

## 2024-09-16 PROCEDURE — 97140 MANUAL THERAPY 1/> REGIONS: CPT

## 2024-09-16 PROCEDURE — 97112 NEUROMUSCULAR REEDUCATION: CPT

## 2024-09-16 NOTE — PROGRESS NOTES
"Daily Note     Today's date: 2024  Patient name: Antonio Mckeon  : 1970  MRN: [id number]  Referring provider: Roland Flores DO  Dx:   Encounter Diagnosis     ICD-10-CM    1. Right shoulder pain, unspecified chronicity  M25.511       2. Primary osteoarthritis of right shoulder  M19.011           Start Time: 0805  Stop Time: 08  Total time in clinic (min): 41 minutes    Subjective: Pt reports that his HEP has been going well. Pt reports no major changes in current end range pain while practicing ROM.       Objective: See treatment diary below      Assessment: Tolerated treatment well. Initiated strengthening program, required minor cueing for para scapular exercises. Pt had heavy pain w/ GH mobs, P to A being the most irritable. Following GH mobs, able to increase passive shoulder flexion from 120 degrees to 160 degrees. Pt unable to get through full ROM w/ prone Y due to pain and limited motion. Patient demonstrated fatigue post treatment, exhibited good technique with therapeutic exercises, and would benefit from continued PT for increased ROM and Strength.       Plan: Continue per plan of care.  Progress treatment as tolerated.       Precautions: Afib, Future surgery cardiology     POC expires Unit limit Auth Expiration date PT/OT/ST + Visit Limit?   12/3/24 BOMN PEND BOMN                           Visit/Unit Tracking  AUTH Status:  Date 9/10 9/16             Yes- 24 Used 1 2              Remaining                  HEP:  [id number]   Manuals 9/10 9/16           GH Mob G3-4  JMK           R shoulder PROM  JMK                                     Neuro Re-Ed             [address]  2x12 35#           [place] extension  2x12 20#           Prone Y  2x10 3\"                                                               Ther Ex             HEP education 8'            UE bike  3'/3'           Thoracic extensions  1/2 FR x20                                                                            Ther " Activity                                       Gait Training                                       Modalities

## 2024-09-18 ENCOUNTER — OFFICE VISIT (OUTPATIENT)
Dept: PHYSICAL THERAPY | Facility: CLINIC | Age: 54
End: 2024-09-18
Payer: COMMERCIAL

## 2024-09-18 DIAGNOSIS — M19.011 PRIMARY OSTEOARTHRITIS OF RIGHT SHOULDER: ICD-10-CM

## 2024-09-18 DIAGNOSIS — M25.511 RIGHT SHOULDER PAIN, UNSPECIFIED CHRONICITY: Primary | ICD-10-CM

## 2024-09-18 PROCEDURE — 97110 THERAPEUTIC EXERCISES: CPT

## 2024-09-18 PROCEDURE — 97112 NEUROMUSCULAR REEDUCATION: CPT

## 2024-09-18 NOTE — PROGRESS NOTES
"Daily Note     Today's date: 2024  Patient name: Antonio Mckeon  : 1970  MRN: [id number]  Referring provider: Roland Flores DO  Dx:   Encounter Diagnosis     ICD-10-CM    1. Right shoulder pain, unspecified chronicity  M25.511       2. Primary osteoarthritis of right shoulder  M19.011                      Subjective: Pt reports he was a little sore following last visit, but no significant complaints.      Objective: See treatment diary below      Assessment: ROM limitations remain with pain at end range in all planes.  Muscle guarding also present during PROM.  Added AAROM exercises to improve overall shoulder ROM.  Patient demonstrates pain limitations and scapular strength limitations.  Pt would benefit from continued PT.      Plan: Continue per plan of care.      Precautions: Afib, Future surgery cardiology     POC expires Unit limit Auth Expiration date PT/OT/ST + Visit Limit?   12/3/24 BOMN PEND BOMN                           Visit/Unit Tracking  AUTH Status:  Date 9/10 9/16 9/18            Yes- 24 Used 1 2 3             Remaining  23 22 21              HEP:  [id number]   Manuals 9/10 9/16 9/18          GH Mob G3-4  JMK           R shoulder PROM  JMK AF                                    Neuro Re-Ed             Comstock Row  2x12 35# 2x12 35#          Comstock extension  2x12 20# 2x12 20#          Prone Y  2x10 3\" 2x10 3\"                                                              Ther Ex             HEP education 8'            UE bike  3'/3' 3'/3'          Thoracic extensions  1/2 FR x20 1/2 FR x20          Supine cane flx   5\"x20          pulleys   3'                                                 Ther Activity                                       Gait Training                                       Modalities                                              "

## 2024-09-20 ENCOUNTER — APPOINTMENT (OUTPATIENT)
Dept: LAB | Facility: HOSPITAL | Age: 54
End: 2024-09-20
Payer: COMMERCIAL

## 2024-09-20 DIAGNOSIS — I48.91 NEW ONSET ATRIAL FIBRILLATION (HCC): ICD-10-CM

## 2024-09-20 DIAGNOSIS — I48.11 LONGSTANDING PERSISTENT ATRIAL FIBRILLATION (HCC): ICD-10-CM

## 2024-09-20 LAB
ALBUMIN SERPL BCG-MCNC: 4 G/DL (ref 3.5–5)
ALP SERPL-CCNC: 72 U/L (ref 34–104)
ALT SERPL W P-5'-P-CCNC: 18 U/L (ref 7–52)
ANION GAP SERPL CALCULATED.3IONS-SCNC: 6 MMOL/L (ref 4–13)
AST SERPL W P-5'-P-CCNC: 18 U/L (ref 13–39)
BASOPHILS # BLD AUTO: 0.05 THOUSANDS/ΜL (ref 0–0.1)
BASOPHILS NFR BLD AUTO: 1 % (ref 0–1)
BILIRUB SERPL-MCNC: 0.97 MG/DL (ref 0.2–1)
BUN SERPL-MCNC: 15 MG/DL (ref 5–25)
CALCIUM SERPL-MCNC: 9.2 MG/DL (ref 8.4–10.2)
CHLORIDE SERPL-SCNC: 106 MMOL/L (ref 96–108)
CO2 SERPL-SCNC: 29 MMOL/L (ref 21–32)
CREAT SERPL-MCNC: 1.02 MG/DL (ref 0.6–1.3)
EOSINOPHIL # BLD AUTO: 0.31 THOUSAND/ΜL (ref 0–0.61)
EOSINOPHIL NFR BLD AUTO: 4 % (ref 0–6)
ERYTHROCYTE [DISTWIDTH] IN BLOOD BY AUTOMATED COUNT: 16.1 % (ref 11.6–15.1)
GFR SERPL CREATININE-BSD FRML MDRD: 82 ML/MIN/1.73SQ M
GLUCOSE SERPL-MCNC: 100 MG/DL (ref 65–140)
HCT VFR BLD AUTO: 44.7 % (ref 36.5–49.3)
HGB BLD-MCNC: 14.6 G/DL (ref 12–17)
IMM GRANULOCYTES # BLD AUTO: 0.02 THOUSAND/UL (ref 0–0.2)
IMM GRANULOCYTES NFR BLD AUTO: 0 % (ref 0–2)
LYMPHOCYTES # BLD AUTO: 1.81 THOUSANDS/ΜL (ref 0.6–4.47)
LYMPHOCYTES NFR BLD AUTO: 24 % (ref 14–44)
MCH RBC QN AUTO: 28.2 PG (ref 26.8–34.3)
MCHC RBC AUTO-ENTMCNC: 32.7 G/DL (ref 31.4–37.4)
MCV RBC AUTO: 86 FL (ref 82–98)
MONOCYTES # BLD AUTO: 0.65 THOUSAND/ΜL (ref 0.17–1.22)
MONOCYTES NFR BLD AUTO: 9 % (ref 4–12)
NEUTROPHILS # BLD AUTO: 4.61 THOUSANDS/ΜL (ref 1.85–7.62)
NEUTS SEG NFR BLD AUTO: 62 % (ref 43–75)
NRBC BLD AUTO-RTO: 0 /100 WBCS
PLATELET # BLD AUTO: 263 THOUSANDS/UL (ref 149–390)
PMV BLD AUTO: 10.1 FL (ref 8.9–12.7)
POTASSIUM SERPL-SCNC: 4.4 MMOL/L (ref 3.5–5.3)
PROT SERPL-MCNC: 6.9 G/DL (ref 6.4–8.4)
RBC # BLD AUTO: 5.18 MILLION/UL (ref 3.88–5.62)
SODIUM SERPL-SCNC: 141 MMOL/L (ref 135–147)
WBC # BLD AUTO: 7.45 THOUSAND/UL (ref 4.31–10.16)

## 2024-09-20 PROCEDURE — 85025 COMPLETE CBC W/AUTO DIFF WBC: CPT

## 2024-09-20 PROCEDURE — 36415 COLL VENOUS BLD VENIPUNCTURE: CPT

## 2024-09-20 PROCEDURE — 80053 COMPREHEN METABOLIC PANEL: CPT

## 2024-09-24 ENCOUNTER — ANESTHESIA EVENT (OUTPATIENT)
Dept: NON INVASIVE DIAGNOSTICS | Facility: HOSPITAL | Age: 54
DRG: 175 | End: 2024-09-24
Payer: COMMERCIAL

## 2024-09-24 PROBLEM — E66.811 OBESITY (BMI 30.0-34.9): Status: ACTIVE | Noted: 2024-09-24

## 2024-09-24 PROBLEM — E66.9 OBESITY (BMI 30.0-34.9): Status: ACTIVE | Noted: 2024-09-24

## 2024-09-24 NOTE — ANESTHESIA PREPROCEDURE EVALUATION
Procedure:  Cardiac eps/afib ablation PFA (Chest)  Cardiac loop recorder implant (Chest)    Relevant Problems   CARDIO   (+) Atrial fibrillation (HCC)   (+) Primary hypertension      /RENAL   (+) Stage 1 chronic kidney disease      MUSCULOSKELETAL   (+) Acute right-sided low back pain with sciatica   (+) Rheumatoid arthritis of multiple sites with negative rheumatoid factor (HCC)      Other   (+) Obesity (BMI 30.0-34.9)      TTE 8/2024      Left Ventricle: Wall thickness is mildly increased. There is mild concentric hypertrophy. The left ventricular ejection fraction is 50%. Systolic function is low normal. Wall motion is normal.  Normal RV. No valvular disease.    [facility] 6/2024  - No significant obstructive CAD              Physical Exam    Airway    Mallampati score: II  TM Distance: >3 FB  Neck ROM: full     Dental   No notable dental hx     Cardiovascular      Pulmonary      Other Findings        Anesthesia Plan  ASA Score- 3     Anesthesia Type- general with ASA Monitors.         Additional Monitors: arterial line.    Airway Plan: ETT.           Plan Factors-Exercise tolerance (METS): >4 METS.    Chart reviewed. EKG reviewed.  Existing labs reviewed. Patient summary reviewed.    Patient is not a current smoker.              Induction- intravenous.    Postoperative Plan- Plan for postoperative opioid use. Planned trial extubation    Perioperative Resuscitation Plan - Level 1 - Full Code.       Informed Consent- Anesthetic plan and risks discussed with patient.  I personally reviewed this patient with the CRNA. Discussed and agreed on the Anesthesia Plan with the CRNA..

## 2024-09-25 ENCOUNTER — APPOINTMENT (OUTPATIENT)
Dept: PHYSICAL THERAPY | Facility: CLINIC | Age: 54
End: 2024-09-25
Payer: COMMERCIAL

## 2024-09-25 ENCOUNTER — HOSPITAL ENCOUNTER (INPATIENT)
Facility: HOSPITAL | Age: 54
LOS: 2 days | Discharge: HOME | DRG: 175 | End: 2024-09-27
Attending: INTERNAL MEDICINE | Admitting: INTERNAL MEDICINE
Payer: COMMERCIAL

## 2024-09-25 ENCOUNTER — HOSPITAL ENCOUNTER (OUTPATIENT)
Dept: NON INVASIVE DIAGNOSTICS | Facility: HOSPITAL | Age: 54
Discharge: HOME | End: 2024-09-25
Payer: COMMERCIAL

## 2024-09-25 ENCOUNTER — ANESTHESIA (OUTPATIENT)
Dept: NON INVASIVE DIAGNOSTICS | Facility: HOSPITAL | Age: 54
DRG: 175 | End: 2024-09-25
Payer: COMMERCIAL

## 2024-09-25 VITALS
SYSTOLIC BLOOD PRESSURE: 164 MMHG | DIASTOLIC BLOOD PRESSURE: 101 MMHG | BODY MASS INDEX: 32.48 KG/M2 | WEIGHT: 232 LBS | HEART RATE: 87 BPM | HEIGHT: 71 IN

## 2024-09-25 DIAGNOSIS — I48.11 LONGSTANDING PERSISTENT ATRIAL FIBRILLATION (HCC): ICD-10-CM

## 2024-09-25 DIAGNOSIS — I48.91 NEW ONSET ATRIAL FIBRILLATION (HCC): ICD-10-CM

## 2024-09-25 PROBLEM — N18.1 STAGE 1 CHRONIC KIDNEY DISEASE: Status: ACTIVE | Noted: 2024-09-25

## 2024-09-25 PROBLEM — I50.20 HEART FAILURE WITH REDUCED EJECTION FRACTION (HCC): Status: ACTIVE | Noted: 2024-09-25

## 2024-09-25 LAB
ANION GAP SERPL CALCULATED.3IONS-SCNC: 5 MMOL/L (ref 4–13)
BUN SERPL-MCNC: 12 MG/DL (ref 5–25)
CALCIUM SERPL-MCNC: 9 MG/DL (ref 8.4–10.2)
CHLORIDE SERPL-SCNC: 108 MMOL/L (ref 96–108)
CO2 SERPL-SCNC: 28 MMOL/L (ref 21–32)
CREAT SERPL-MCNC: 0.95 MG/DL (ref 0.6–1.3)
ERYTHROCYTE [DISTWIDTH] IN BLOOD BY AUTOMATED COUNT: 15.9 % (ref 11.6–15.1)
GFR SERPL CREATININE-BSD FRML MDRD: 89 ML/MIN/1.73SQ M
GLUCOSE P FAST SERPL-MCNC: 91 MG/DL (ref 65–99)
GLUCOSE SERPL-MCNC: 91 MG/DL (ref 65–140)
HCT VFR BLD AUTO: 41.5 % (ref 36.5–49.3)
HGB BLD-MCNC: 14 G/DL (ref 12–17)
INR PPP: 1.15 (ref 0.85–1.19)
KCT BLD-ACNC: 294 SEC (ref 89–137)
KCT BLD-ACNC: 307 SEC (ref 89–137)
KCT BLD-ACNC: 321 SEC (ref 89–137)
KCT BLD-ACNC: 341 SEC (ref 89–137)
KCT BLD-ACNC: 347 SEC (ref 89–137)
KCT BLD-ACNC: 348 SEC (ref 89–137)
MCH RBC QN AUTO: 29.1 PG (ref 26.8–34.3)
MCHC RBC AUTO-ENTMCNC: 33.7 G/DL (ref 31.4–37.4)
MCV RBC AUTO: 86 FL (ref 82–98)
PLATELET # BLD AUTO: 233 THOUSANDS/UL (ref 149–390)
PMV BLD AUTO: 9.6 FL (ref 8.9–12.7)
POTASSIUM SERPL-SCNC: 4.4 MMOL/L (ref 3.5–5.3)
PROTHROMBIN TIME: 15 SECONDS (ref 12.3–15)
RBC # BLD AUTO: 4.81 MILLION/UL (ref 3.88–5.62)
SL CV LV EF: 50
SODIUM SERPL-SCNC: 141 MMOL/L (ref 135–147)
SPECIMEN SOURCE: ABNORMAL
WBC # BLD AUTO: 6.89 THOUSAND/UL (ref 4.31–10.16)

## 2024-09-25 PROCEDURE — C1894 INTRO/SHEATH, NON-LASER: HCPCS | Performed by: INTERNAL MEDICINE

## 2024-09-25 PROCEDURE — C1731 CATH, EP, 20 OR MORE ELEC: HCPCS | Performed by: INTERNAL MEDICINE

## 2024-09-25 PROCEDURE — 76376 3D RENDER W/INTRP POSTPROCES: CPT | Performed by: INTERNAL MEDICINE

## 2024-09-25 PROCEDURE — 93005 ELECTROCARDIOGRAM TRACING: CPT

## 2024-09-25 PROCEDURE — 4A0234Z MEASUREMENT OF CARDIAC ELECTRICAL ACTIVITY, PERCUTANEOUS APPROACH: ICD-10-PCS | Performed by: INTERNAL MEDICINE

## 2024-09-25 PROCEDURE — C1733 CATH, EP, OTHR THAN COOL-TIP: HCPCS | Performed by: INTERNAL MEDICINE

## 2024-09-25 PROCEDURE — 02583ZF DESTRUCTION OF CONDUCTION MECHANISM USING IRREVERSIBLE ELECTROPORATION, PERCUTANEOUS APPROACH: ICD-10-PCS | Performed by: INTERNAL MEDICINE

## 2024-09-25 PROCEDURE — 4A023FZ MEASUREMENT OF CARDIAC RHYTHM, PERCUTANEOUS APPROACH: ICD-10-PCS | Performed by: INTERNAL MEDICINE

## 2024-09-25 PROCEDURE — 85610 PROTHROMBIN TIME: CPT | Performed by: PHYSICIAN ASSISTANT

## 2024-09-25 PROCEDURE — C1766 INTRO/SHEATH,STRBLE,NON-PEEL: HCPCS | Performed by: INTERNAL MEDICINE

## 2024-09-25 PROCEDURE — 80048 BASIC METABOLIC PNL TOTAL CA: CPT | Performed by: PHYSICIAN ASSISTANT

## 2024-09-25 PROCEDURE — C1764 EVENT RECORDER, CARDIAC: HCPCS | Performed by: INTERNAL MEDICINE

## 2024-09-25 PROCEDURE — 92960 CARDIOVERSION ELECTRIC EXT: CPT | Performed by: INTERNAL MEDICINE

## 2024-09-25 PROCEDURE — 93657 TX L/R ATRIAL FIB ADDL: CPT | Performed by: INTERNAL MEDICINE

## 2024-09-25 PROCEDURE — 93321 DOPPLER ECHO F-UP/LMTD STD: CPT | Performed by: INTERNAL MEDICINE

## 2024-09-25 PROCEDURE — 93312 ECHO TRANSESOPHAGEAL: CPT

## 2024-09-25 PROCEDURE — 85347 COAGULATION TIME ACTIVATED: CPT

## 2024-09-25 PROCEDURE — 93656 COMPRE EP EVAL ABLTJ ATR FIB: CPT | Performed by: INTERNAL MEDICINE

## 2024-09-25 PROCEDURE — 33285 INSJ SUBQ CAR RHYTHM MNTR: CPT | Performed by: INTERNAL MEDICINE

## 2024-09-25 PROCEDURE — 76937 US GUIDE VASCULAR ACCESS: CPT | Performed by: INTERNAL MEDICINE

## 2024-09-25 PROCEDURE — C1769 GUIDE WIRE: HCPCS | Performed by: INTERNAL MEDICINE

## 2024-09-25 PROCEDURE — C1730 CATH, EP, 19 OR FEW ELECT: HCPCS | Performed by: INTERNAL MEDICINE

## 2024-09-25 PROCEDURE — 02K83ZZ MAP CONDUCTION MECHANISM, PERCUTANEOUS APPROACH: ICD-10-PCS | Performed by: INTERNAL MEDICINE

## 2024-09-25 PROCEDURE — 76376 3D RENDER W/INTRP POSTPROCES: CPT

## 2024-09-25 PROCEDURE — C1759 CATH, INTRA ECHOCARDIOGRAPHY: HCPCS | Performed by: INTERNAL MEDICINE

## 2024-09-25 PROCEDURE — 93325 DOPPLER ECHO COLOR FLOW MAPG: CPT | Performed by: INTERNAL MEDICINE

## 2024-09-25 PROCEDURE — 85027 COMPLETE CBC AUTOMATED: CPT | Performed by: PHYSICIAN ASSISTANT

## 2024-09-25 PROCEDURE — 93312 ECHO TRANSESOPHAGEAL: CPT | Performed by: INTERNAL MEDICINE

## 2024-09-25 PROCEDURE — NC001 PR NO CHARGE: Performed by: PHYSICIAN ASSISTANT

## 2024-09-25 PROCEDURE — 0JH602Z INSERTION OF MONITORING DEVICE INTO CHEST SUBCUTANEOUS TISSUE AND FASCIA, OPEN APPROACH: ICD-10-PCS | Performed by: INTERNAL MEDICINE

## 2024-09-25 DEVICE — ICM LNQ22 LINQ II PRIME US
Type: IMPLANTABLE DEVICE | Site: CHEST | Status: FUNCTIONAL
Brand: LINQ II™

## 2024-09-25 RX ORDER — ACETAMINOPHEN 325 MG/1
650 TABLET ORAL EVERY 4 HOURS PRN
Status: DISCONTINUED | OUTPATIENT
Start: 2024-09-25 | End: 2024-09-27 | Stop reason: HOSPADM

## 2024-09-25 RX ORDER — FENTANYL CITRATE/PF 50 MCG/ML
25 SYRINGE (ML) INJECTION
Status: DISCONTINUED | OUTPATIENT
Start: 2024-09-25 | End: 2024-09-25 | Stop reason: HOSPADM

## 2024-09-25 RX ORDER — ONDANSETRON 2 MG/ML
INJECTION INTRAMUSCULAR; INTRAVENOUS AS NEEDED
Status: DISCONTINUED | OUTPATIENT
Start: 2024-09-25 | End: 2024-09-25

## 2024-09-25 RX ORDER — HEPARIN SODIUM 1000 [USP'U]/ML
INJECTION, SOLUTION INTRAVENOUS; SUBCUTANEOUS CODE/TRAUMA/SEDATION MEDICATION
Status: DISCONTINUED | OUTPATIENT
Start: 2024-09-25 | End: 2024-09-25 | Stop reason: HOSPADM

## 2024-09-25 RX ORDER — FENTANYL CITRATE 50 UG/ML
INJECTION, SOLUTION INTRAMUSCULAR; INTRAVENOUS AS NEEDED
Status: DISCONTINUED | OUTPATIENT
Start: 2024-09-25 | End: 2024-09-25

## 2024-09-25 RX ORDER — LIDOCAINE HYDROCHLORIDE 10 MG/ML
INJECTION, SOLUTION EPIDURAL; INFILTRATION; INTRACAUDAL; PERINEURAL AS NEEDED
Status: DISCONTINUED | OUTPATIENT
Start: 2024-09-25 | End: 2024-09-25

## 2024-09-25 RX ORDER — DOFETILIDE 0.5 MG/1
500 CAPSULE ORAL EVERY 12 HOURS SCHEDULED
Status: DISCONTINUED | OUTPATIENT
Start: 2024-09-25 | End: 2024-09-27 | Stop reason: HOSPADM

## 2024-09-25 RX ORDER — SODIUM CHLORIDE 9 MG/ML
20 INJECTION, SOLUTION INTRAVENOUS CONTINUOUS
Status: DISCONTINUED | OUTPATIENT
Start: 2024-09-25 | End: 2024-09-27 | Stop reason: HOSPADM

## 2024-09-25 RX ORDER — PANTOPRAZOLE SODIUM 40 MG/1
40 TABLET, DELAYED RELEASE ORAL
Status: DISCONTINUED | OUTPATIENT
Start: 2024-09-26 | End: 2024-09-27 | Stop reason: HOSPADM

## 2024-09-25 RX ORDER — PROTAMINE SULFATE 10 MG/ML
INJECTION, SOLUTION INTRAVENOUS AS NEEDED
Status: DISCONTINUED | OUTPATIENT
Start: 2024-09-25 | End: 2024-09-25

## 2024-09-25 RX ORDER — LIDOCAINE HYDROCHLORIDE 10 MG/ML
INJECTION, SOLUTION EPIDURAL; INFILTRATION; INTRACAUDAL; PERINEURAL CODE/TRAUMA/SEDATION MEDICATION
Status: DISCONTINUED | OUTPATIENT
Start: 2024-09-25 | End: 2024-09-25 | Stop reason: HOSPADM

## 2024-09-25 RX ORDER — DEXAMETHASONE SODIUM PHOSPHATE 10 MG/ML
INJECTION, SOLUTION INTRAMUSCULAR; INTRAVENOUS AS NEEDED
Status: DISCONTINUED | OUTPATIENT
Start: 2024-09-25 | End: 2024-09-25

## 2024-09-25 RX ORDER — PROPOFOL 10 MG/ML
INJECTION, EMULSION INTRAVENOUS AS NEEDED
Status: DISCONTINUED | OUTPATIENT
Start: 2024-09-25 | End: 2024-09-25

## 2024-09-25 RX ORDER — HEPARIN SODIUM 10000 [USP'U]/100ML
INJECTION, SOLUTION INTRAVENOUS
Status: DISCONTINUED | OUTPATIENT
Start: 2024-09-25 | End: 2024-09-25 | Stop reason: HOSPADM

## 2024-09-25 RX ORDER — SODIUM CHLORIDE 9 MG/ML
INJECTION, SOLUTION INTRAVENOUS CONTINUOUS PRN
Status: DISCONTINUED | OUTPATIENT
Start: 2024-09-25 | End: 2024-09-25

## 2024-09-25 RX ORDER — GLYCOPYRROLATE 0.2 MG/ML
INJECTION INTRAMUSCULAR; INTRAVENOUS AS NEEDED
Status: DISCONTINUED | OUTPATIENT
Start: 2024-09-25 | End: 2024-09-25

## 2024-09-25 RX ORDER — SODIUM CHLORIDE 9 MG/ML
20 INJECTION, SOLUTION INTRAVENOUS ONCE
Status: DISCONTINUED | OUTPATIENT
Start: 2024-09-25 | End: 2024-09-25 | Stop reason: HOSPADM

## 2024-09-25 RX ORDER — HYDROMORPHONE HCL/PF 1 MG/ML
0.5 SYRINGE (ML) INJECTION
Status: DISCONTINUED | OUTPATIENT
Start: 2024-09-25 | End: 2024-09-25 | Stop reason: HOSPADM

## 2024-09-25 RX ORDER — PANTOPRAZOLE SODIUM 40 MG/10ML
40 INJECTION, POWDER, LYOPHILIZED, FOR SOLUTION INTRAVENOUS ONCE
Status: DISCONTINUED | OUTPATIENT
Start: 2024-09-25 | End: 2024-09-25 | Stop reason: HOSPADM

## 2024-09-25 RX ORDER — METOPROLOL SUCCINATE 100 MG/1
100 TABLET, EXTENDED RELEASE ORAL DAILY
Status: DISCONTINUED | OUTPATIENT
Start: 2024-09-25 | End: 2024-09-25

## 2024-09-25 RX ORDER — MIDAZOLAM HYDROCHLORIDE 2 MG/2ML
INJECTION, SOLUTION INTRAMUSCULAR; INTRAVENOUS AS NEEDED
Status: DISCONTINUED | OUTPATIENT
Start: 2024-09-25 | End: 2024-09-25

## 2024-09-25 RX ORDER — LISINOPRIL 5 MG/1
5 TABLET ORAL DAILY
Status: DISCONTINUED | OUTPATIENT
Start: 2024-09-25 | End: 2024-09-27 | Stop reason: HOSPADM

## 2024-09-25 RX ORDER — METOPROLOL SUCCINATE 50 MG/1
50 TABLET, EXTENDED RELEASE ORAL DAILY
Status: DISCONTINUED | OUTPATIENT
Start: 2024-09-26 | End: 2024-09-27 | Stop reason: HOSPADM

## 2024-09-25 RX ORDER — ROCURONIUM BROMIDE 10 MG/ML
INJECTION, SOLUTION INTRAVENOUS AS NEEDED
Status: DISCONTINUED | OUTPATIENT
Start: 2024-09-25 | End: 2024-09-25

## 2024-09-25 RX ADMIN — LIDOCAINE HYDROCHLORIDE 50 MG: 10 INJECTION, SOLUTION EPIDURAL; INFILTRATION; INTRACAUDAL; PERINEURAL at 10:05

## 2024-09-25 RX ADMIN — APIXABAN 5 MG: 5 TABLET, FILM COATED ORAL at 15:09

## 2024-09-25 RX ADMIN — ACETAMINOPHEN 650 MG: 325 TABLET ORAL at 15:09

## 2024-09-25 RX ADMIN — MIDAZOLAM 2 MG: 1 INJECTION INTRAMUSCULAR; INTRAVENOUS at 09:57

## 2024-09-25 RX ADMIN — DEXAMETHASONE SODIUM PHOSPHATE 4 MG: 10 INJECTION, SOLUTION INTRAMUSCULAR; INTRAVENOUS at 10:10

## 2024-09-25 RX ADMIN — SODIUM CHLORIDE: 0.9 INJECTION, SOLUTION INTRAVENOUS at 09:45

## 2024-09-25 RX ADMIN — GLYCOPYRROLATE 0.3 MG: 0.2 INJECTION, SOLUTION INTRAMUSCULAR; INTRAVENOUS at 11:46

## 2024-09-25 RX ADMIN — SODIUM CHLORIDE 20 ML/HR: 0.9 INJECTION, SOLUTION INTRAVENOUS at 08:52

## 2024-09-25 RX ADMIN — SODIUM CHLORIDE: 0.9 INJECTION, SOLUTION INTRAVENOUS at 13:15

## 2024-09-25 RX ADMIN — PHENYLEPHRINE HYDROCHLORIDE 20 MCG/MIN: 10 INJECTION INTRAVENOUS at 10:54

## 2024-09-25 RX ADMIN — PROPOFOL 200 MG: 10 INJECTION, EMULSION INTRAVENOUS at 10:05

## 2024-09-25 RX ADMIN — DOFETILIDE 500 MCG: 500 CAPSULE ORAL at 19:30

## 2024-09-25 RX ADMIN — SUGAMMADEX 250 MG: 100 INJECTION, SOLUTION INTRAVENOUS at 13:43

## 2024-09-25 RX ADMIN — FENTANYL CITRATE 100 MCG: 50 INJECTION INTRAMUSCULAR; INTRAVENOUS at 10:04

## 2024-09-25 RX ADMIN — ROCURONIUM BROMIDE 30 MG: 10 INJECTION, SOLUTION INTRAVENOUS at 11:48

## 2024-09-25 RX ADMIN — LISINOPRIL 5 MG: 5 TABLET ORAL at 15:09

## 2024-09-25 RX ADMIN — ROCURONIUM BROMIDE 20 MG: 10 INJECTION, SOLUTION INTRAVENOUS at 12:44

## 2024-09-25 RX ADMIN — ONDANSETRON 4 MG: 2 INJECTION INTRAMUSCULAR; INTRAVENOUS at 10:10

## 2024-09-25 RX ADMIN — ROCURONIUM BROMIDE 100 MG: 10 INJECTION, SOLUTION INTRAVENOUS at 10:05

## 2024-09-25 RX ADMIN — PROTAMINE SULFATE 80 MG: 10 INJECTION, SOLUTION INTRAVENOUS at 13:06

## 2024-09-25 NOTE — ASSESSMENT & PLAN NOTE
Lab Results   Component Value Date    EGFR 89 09/25/2024    EGFR 82 09/20/2024    EGFR 83 06/04/2024    CREATININE 0.95 09/25/2024    CREATININE 1.02 09/20/2024    CREATININE 1.01 06/04/2024   Creatinine today stable  Continue to monitor while on Tikosyn

## 2024-09-25 NOTE — ANESTHESIA POSTPROCEDURE EVALUATION
Post-Op Assessment Note    CV Status:  Stable    Pain management: adequate       Mental Status:  Alert and awake   Hydration Status:  Euvolemic   PONV Controlled:  Controlled   Airway Patency:  Patent     Post Op Vitals Reviewed: Yes    No anethesia notable event occurred.    Staff: Anesthesiologist, CRNA               BP   127/76   Temp   97.4   Pulse  71   Resp   18   SpO2   100

## 2024-09-25 NOTE — H&P
H&P Exam - Electrophysiology - Cardiology   Antonio Mckeon 53 y.o. male MRN: [id number]  Unit/Bed#: BE CATH LAB ROOM Encounter: [id number]  Assessment & Plan  Atrial fibrillation (HCC)  New onset June 2024 admitted for RVR found to have EF 40%  S/p DCCV 5/31  Started on metoprolol, and lisinopril for GDMT currently on metoprolol 100 mg twice daily for rate control  Eliquis 5 mg twice daily for C2 V score 2  Mild biatrial dilation on echo 5/31  Recommended for ablation after seeing Dr. Chester in the office with AAD administration as well as loop recorder implant  Heart failure with reduced ejection fraction (HCC)  Wt Readings from Last 3 Encounters:   09/25/24 105 kg (232 lb)   09/04/24 105 kg (231 lb)   08/30/24 107 kg (235 lb)   Noted new onset in May 2024 after presenting in new onset rapid atrial fibrillation  Echocardiogram at that time with EF 40% with grade 2 diastolic dysfunction with mild biatrial dilation  GDMT with metoprolol, and lisinopril  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  Cardiac catheterization 6/3/2024 without coronary artery disease    Obesity (BMI 30.0-34.9)  With BMI 32.36  Lifestyle modification  Stage 1 chronic kidney disease  Lab Results   Component Value Date    EGFR 89 09/25/2024    EGFR 82 09/20/2024    EGFR 83 06/04/2024    CREATININE 0.95 09/25/2024    CREATININE 1.02 09/20/2024    CREATININE 1.01 06/04/2024   Creatinine today stable  Continue to monitor while on Tikosyn      History of Present Illness   HPI:  Antonio Mckeon is a 53 y.o. year old male with past medical history as mentioned above.  Patient presents to [facility] today to undergo elective atrial fibrillation ablation as well as loop recorder implant and medical admission with dofetilide loading.  Briefly, patient's history starts in May 2024 when he was first diagnosed with atrial fibrillation after he was found to be in RVR.  He was admitted at that time and started on blood thinner with Eliquis  5 mg twice daily for [id number] score of 2.  He underwent SPENCER and cardioversion on 5/31/2024.  He underwent echocardiogram at that time showing newly reduced EF to 40% with mild biatrial dilation.  Subsequent cardiac catheterization 2 months later did not reveal any obstructive coronary artery disease.  Patient was started on GDMT with metoprolol, and lisinopril.  He was seen by electrophysiology in the outpatient setting and it was felt that this could be related to tachycardia related cardiomyopathy thus patient was recommended for atrial fibrillation ablation as well as antiarrhythmic start with loop recorder implantation for further monitoring.      Review of Systems   Constitutional:  Negative for fatigue and fever.   Respiratory:  Negative for choking, chest tightness and shortness of breath.    Cardiovascular:  Negative for chest pain, palpitations and leg swelling.   Neurological:  Negative for dizziness and light-headedness.   All other systems reviewed and are negative.    ROS as noted above, otherwise 12 point review of systems was performed and is negative.     Historical Information   Past Medical History:   Diagnosis Date    Heart disease     Hypertension      Past Surgical History:   Procedure Laterality Date    CARDIAC CATHETERIZATION N/A 2010    CARDIAC CATHETERIZATION Left 6/3/2024    Procedure: Cardiac catheterization;  Surgeon: Rosalinda Novoa MD;  Location: [facility] CARDIAC C[facility] LAB;  Service: Cardiology    CARDIAC CATHETERIZATION N/A 6/3/2024    Procedure: Cardiac Coronary Angiogram;  Surgeon: Rosalinda Novoa MD;  Location: [facility] CARDIAC C[facility];  Service: Cardiology    KNEE ARTHROSCOPY W/ MENISCAL REPAIR Bilateral     ROTATOR CUFF REPAIR Left      Family History:   Family History   Problem Relation Age of Onset    Diabetes Mother     Heart disease Mother     Rheum arthritis Mother     Alcohol abuse Father     Stomach cancer Father     Breast cancer Maternal Grandmother        Social History  "  Social History     Substance and Sexual Activity   Alcohol Use Yes    Comment: sobor x1 mo 5/2022     Social History     Substance and Sexual Activity   Drug Use No     Social History     Tobacco Use   Smoking Status Never   Smokeless Tobacco Never       Meds/Allergies   all medications and allergies reviewed  Home Medications:   Medications Prior to Admission:     Eliquis 5 MG    metoprolol succinate (TOPROL-XL) 100 mg 24 hr tablet    lisinopril (ZESTRIL) 5 mg tablet    No Known Allergies    Objective   Vitals: Blood pressure (!) 164/101, pulse 87, temperature 98.7 °F (37.1 °C), temperature source Temporal, resp. rate 16, height 5' 11\" (1.803 m), weight 105 kg (232 lb), SpO2 96%.  Orthostatic Blood Pressures      Flowsheet Row Most Recent Value   Blood Pressure 164/101  [nurse notified] filed at 09/25/2024 0903   Patient Position - Orthostatic VS Lying filed at 09/25/2024 0903            No intake or output data in the 24 hours ending 09/25/24 0938    Invasive Devices       Peripheral Intravenous Line  Duration             Peripheral IV 09/25/24 Left;Ventral (anterior) Forearm <1 day    Peripheral IV 09/25/24 Right Antecubital <1 day                    Physical Exam  Vitals and nursing note reviewed.   Constitutional:       General: He is not in acute distress.  HENT:      Head: Normocephalic and atraumatic.   Cardiovascular:      Rate and Rhythm: Normal rate. Rhythm irregular.   Pulmonary:      Effort: Pulmonary effort is normal.      Breath sounds: Normal breath sounds.   Musculoskeletal:      Right lower leg: No edema.      Left lower leg: No edema.   Skin:     General: Skin is warm and dry.      Coloration: Skin is not jaundiced.   Neurological:      Mental Status: He is alert.         Lab Results: I have personally reviewed pertinent lab results.    Results from last 7 days   Lab Units 09/25/24  0851 09/20/24  1211   WBC Thousand/uL 6.89 7.45   HEMOGLOBIN g/dL 14.0 14.6   HEMATOCRIT % 41.5 44.7   PLATELETS " Thousands/uL 233 263     Results from last 7 days   Lab Units 09/25/24  0851 09/20/24  1211   POTASSIUM mmol/L 4.4 4.4   CHLORIDE mmol/L 108 106   CO2 mmol/L 28 29   BUN mg/dL 12 15   CREATININE mg/dL 0.95 1.02   CALCIUM mg/dL 9.0 9.2     Results from last 7 days   Lab Units 09/25/24  0851   INR  1.15             Imaging: No pertinent imaging studies reviewed.  No results found for this or any previous visit.      Code Status: Level 1 - Full Code    ** Please Note: Dictation voice to text software may have been used in the creation of this document. **

## 2024-09-25 NOTE — PROGRESS NOTES
Pt's bedrest time over 1900..assessed dressings before pt moved...left chest wall dressing starting to ooze through..reinforced dressing. B/L groin dressings clean dry and intact. Assisted pt to side of the bed to sit up. Left room and was called back about 5 mins later ...right groin dressing now saturated with red blood gross amount. Direct pressure applied. Dr Lee notified immediately and responded will be up to see the patient. Pressure still applied.

## 2024-09-25 NOTE — ASSESSMENT & PLAN NOTE
New onset June 2024 admitted for RVR found to have EF 40%  S/p DCCV 5/31  Started on metoprolol, and lisinopril for GDMT currently on metoprolol 100 mg twice daily for rate control  Eliquis 5 mg twice daily for C2 V score 2  Mild biatrial dilation on echo 5/31  Recommended for ablation after seeing Dr. Chester in the office with AAD administration as well as loop recorder implant

## 2024-09-25 NOTE — ASSESSMENT & PLAN NOTE
Wt Readings from Last 3 Encounters:   09/25/24 105 kg (232 lb)   09/04/24 105 kg (231 lb)   08/30/24 107 kg (235 lb)   Noted new onset in May 2024 after presenting in new onset rapid atrial fibrillation  Echocardiogram at that time with EF 40% with grade 2 diastolic dysfunction with mild biatrial dilation  GDMT with metoprolol, and lisinopril  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  Cardiac catheterization 6/3/2024 without coronary artery disease

## 2024-09-26 LAB
ANION GAP SERPL CALCULATED.3IONS-SCNC: 7 MMOL/L (ref 4–13)
ATRIAL RATE: 104 BPM
ATRIAL RATE: 65 BPM
ATRIAL RATE: 68 BPM
ATRIAL RATE: 71 BPM
ATRIAL RATE: 86 BPM
BUN SERPL-MCNC: 12 MG/DL (ref 5–25)
CALCIUM SERPL-MCNC: 8.5 MG/DL (ref 8.4–10.2)
CHLORIDE SERPL-SCNC: 107 MMOL/L (ref 96–108)
CO2 SERPL-SCNC: 27 MMOL/L (ref 21–32)
CREAT SERPL-MCNC: 0.93 MG/DL (ref 0.6–1.3)
ERYTHROCYTE [DISTWIDTH] IN BLOOD BY AUTOMATED COUNT: 16.7 % (ref 11.6–15.1)
GFR SERPL CREATININE-BSD FRML MDRD: 92 ML/MIN/1.73SQ M
GLUCOSE SERPL-MCNC: 119 MG/DL (ref 65–140)
HCT VFR BLD AUTO: 36.8 % (ref 36.5–49.3)
HGB BLD-MCNC: 12 G/DL (ref 12–17)
MCH RBC QN AUTO: 29.1 PG (ref 26.8–34.3)
MCHC RBC AUTO-ENTMCNC: 32.6 G/DL (ref 31.4–37.4)
MCV RBC AUTO: 89 FL (ref 82–98)
P AXIS: 60 DEGREES
P AXIS: 61 DEGREES
P AXIS: 64 DEGREES
P AXIS: 73 DEGREES
PLATELET # BLD AUTO: 240 THOUSANDS/UL (ref 149–390)
PMV BLD AUTO: 10.6 FL (ref 8.9–12.7)
POTASSIUM SERPL-SCNC: 3.9 MMOL/L (ref 3.5–5.3)
PR INTERVAL: 146 MS
PR INTERVAL: 156 MS
PR INTERVAL: 158 MS
PR INTERVAL: 160 MS
QRS AXIS: -15 DEGREES
QRS AXIS: -2 DEGREES
QRS AXIS: -26 DEGREES
QRS AXIS: -27 DEGREES
QRS AXIS: -27 DEGREES
QRSD INTERVAL: 102 MS
QRSD INTERVAL: 110 MS
QRSD INTERVAL: 112 MS
QRSD INTERVAL: 112 MS
QRSD INTERVAL: 116 MS
QT INTERVAL: 366 MS
QT INTERVAL: 390 MS
QT INTERVAL: 414 MS
QT INTERVAL: 422 MS
QT INTERVAL: 452 MS
QTC INTERVAL: 406 MS
QTC INTERVAL: 448 MS
QTC INTERVAL: 449 MS
QTC INTERVAL: 466 MS
QTC INTERVAL: 470 MS
RBC # BLD AUTO: 4.12 MILLION/UL (ref 3.88–5.62)
SODIUM SERPL-SCNC: 141 MMOL/L (ref 135–147)
T WAVE AXIS: 21 DEGREES
T WAVE AXIS: 33 DEGREES
T WAVE AXIS: 49 DEGREES
T WAVE AXIS: 53 DEGREES
T WAVE AXIS: 72 DEGREES
VENTRICULAR RATE: 65 BPM
VENTRICULAR RATE: 68 BPM
VENTRICULAR RATE: 71 BPM
VENTRICULAR RATE: 74 BPM
VENTRICULAR RATE: 86 BPM
WBC # BLD AUTO: 10.47 THOUSAND/UL (ref 4.31–10.16)

## 2024-09-26 PROCEDURE — 85027 COMPLETE CBC AUTOMATED: CPT | Performed by: PHYSICIAN ASSISTANT

## 2024-09-26 PROCEDURE — 99232 SBSQ HOSP IP/OBS MODERATE 35: CPT | Performed by: INTERNAL MEDICINE

## 2024-09-26 PROCEDURE — 93010 ELECTROCARDIOGRAM REPORT: CPT | Performed by: INTERNAL MEDICINE

## 2024-09-26 PROCEDURE — 93005 ELECTROCARDIOGRAM TRACING: CPT

## 2024-09-26 PROCEDURE — 80048 BASIC METABOLIC PNL TOTAL CA: CPT | Performed by: PHYSICIAN ASSISTANT

## 2024-09-26 RX ORDER — POTASSIUM CHLORIDE 1500 MG/1
40 TABLET, EXTENDED RELEASE ORAL ONCE
Status: COMPLETED | OUTPATIENT
Start: 2024-09-26 | End: 2024-09-26

## 2024-09-26 RX ORDER — CEPHALEXIN 500 MG/1
500 CAPSULE ORAL ONCE
Status: COMPLETED | OUTPATIENT
Start: 2024-09-26 | End: 2024-09-26

## 2024-09-26 RX ADMIN — PANTOPRAZOLE SODIUM 40 MG: 40 TABLET, DELAYED RELEASE ORAL at 05:21

## 2024-09-26 RX ADMIN — LISINOPRIL 5 MG: 5 TABLET ORAL at 09:55

## 2024-09-26 RX ADMIN — CEPHALEXIN 500 MG: 500 CAPSULE ORAL at 11:19

## 2024-09-26 RX ADMIN — DOFETILIDE 500 MCG: 500 CAPSULE ORAL at 18:48

## 2024-09-26 RX ADMIN — APIXABAN 5 MG: 5 TABLET, FILM COATED ORAL at 09:55

## 2024-09-26 RX ADMIN — DOFETILIDE 500 MCG: 500 CAPSULE ORAL at 06:29

## 2024-09-26 RX ADMIN — METOPROLOL SUCCINATE 50 MG: 50 TABLET, EXTENDED RELEASE ORAL at 09:56

## 2024-09-26 RX ADMIN — POTASSIUM CHLORIDE 40 MEQ: 1500 TABLET, EXTENDED RELEASE ORAL at 09:56

## 2024-09-26 RX ADMIN — APIXABAN 5 MG: 5 TABLET, FILM COATED ORAL at 18:48

## 2024-09-26 NOTE — CASE MANAGEMENT
Case Management Assessment & Discharge Planning Note    Patient name Antonio Mckeon  [facility] MRN [id number]  : 1970 Date 2024       Current Admission Date: 2024  Current Admission Diagnosis:Atrial fibrillation (HCC)   Patient Active Problem List    Diagnosis Date Noted Date Diagnosed    Stage 1 chronic kidney disease 2024     Heart failure with reduced ejection fraction (HCC) 2024     Obesity (BMI 30.0-34.9) 2024     Acute right-sided low back pain with sciatica 2024     Cardiomyopathy (HCC) 2024     Atrial fibrillation (HCC) 2024     Encounter for support and coordination of transition of care 10/26/2023     Rheumatoid arthritis of multiple sites with negative rheumatoid factor (HCC) 10/26/2023     Primary hypertension 10/26/2023     Intermittent palpitations 10/26/2023     Pain in both hands 2022       LOS (days): 1  Geometric Mean LOS (GMLOS) (days): 1.2  Days to GMLOS:0     OBJECTIVE:    Risk of Unplanned Readmission Score: 8.97         Current admission status: Inpatient       Preferred Pharmacy:   [id number] [place] - [address] [facility]  [address] [place] [id number]  Phone: [phone] Fax: [phone]    [facility] [place] [address]  Phone: [phone] Fax: [phone]    Primary Care Provider: Kayla Fall PA-C    Primary Insurance: [employer]  Secondary Insurance:     ASSESSMENT:  Active Health Care Proxies       Letha Diazasha [facility] Care Representative - Significant Other   Primary Phone: [phone] (Mobile)                       Readmission Root Cause  30 Day Readmission: No    Patient Information  Admitted from:: Home  Mental Status: Alert  During Assessment patient was accompanied by: Not accompanied during assessment  Assessment information provided by::  Patient  Primary Caregiver: Self  Support Systems: Self, Spouse/significant other  County of Residence: [place]  What city do you live in?: [facility]  Home entry access options. Select all that apply.: No steps to enter home  Type of Current Residence: [facility]  Living Arrangements: Lives w/ Spouse/significant other  Is patient a ?: No    Activities of Daily Living Prior to Admission  Functional Status: Independent  Completes ADLs independently?: Yes  Ambulates independently?: Yes  Does patient use assisted devices?: No  Does patient currently own DME?: No  Does patient have a history of Outpatient Therapy (PT/OT)?: No  Does the patient have a history of Short-Term Rehab?: No  Does patient have a history of HHC?: No  Does patient currently have HHC?: No       Patient Information Continued  Income Source: Employed  Does patient have prescription coverage?: Yes  Does patient receive dialysis treatments?: No  Does patient have a history of substance abuse?: Yes  Historical substance use preference: Alcohol/ETOH (3-4 drinks a day)  History of Withdrawal Symptoms: Denies past symptoms  Is patient currently in treatment for substance abuse?: No. Patient declined treatment information.  Does patient have a history of Mental Health Diagnosis?: No       Means of Transportation  Means of Transport to Appts:: Drives Self      Social Determinants of Health (SDOH)      Flowsheet Row Most Recent Value   Housing Stability    In the last 12 months, was there a time when you were not able to pay the mortgage or rent on time? N   In the past 12 months, how many times have you moved where you were living? 0   At any time in the past 12 months, were you homeless or living in a shelter (including now)? N   Transportation Needs    In the past 12 months, has lack of transportation kept you from medical appointments or from getting medications? no   In the past 12 months, has lack of transportation kept you from meetings, work, or from  getting things needed for daily living? No   Food Insecurity    Within the past 12 months, you worried that your food would run out before you got the money to buy more. Never true   Within the past 12 months, the food you bought just didn't last and you didn't have money to get more. Never true   Utilities    In the past 12 months has the electric, gas, oil, or water company threatened to shut off services in your home? No            DISCHARGE DETAILS:       Other Referral/Resources/Interventions Provided:  Referral Comments: Patient admitted with a-fib, stage 1 CKD.  Anticipated DC home no needs.  CM following for DC needs.

## 2024-09-26 NOTE — ASSESSMENT & PLAN NOTE
Wt Readings from Last 3 Encounters:   09/25/24 105 kg (232 lb)   09/25/24 105 kg (232 lb)   09/04/24 105 kg (231 lb)   Noted new onset in May 2024 after presenting in new onset rapid atrial fibrillation  Echocardiogram at that time with EF 40% with grade 2 diastolic dysfunction with mild biatrial dilation  GDMT with metoprolol, and lisinopril  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  Cardiac catheterization 6/3/2024 without coronary artery disease

## 2024-09-26 NOTE — ASSESSMENT & PLAN NOTE
New onset June 2024 admitted for RVR found to have EF 40%  S/p DCCV 5/31  Started on metoprolol, and lisinopril for GDMT currently on metoprolol 100 mg twice daily for rate control  Eliquis 5 mg twice daily for C2 V score 2  Mild biatrial dilation on echo 5/31  S/p PFA PVI, PW a fib ablation and loop implant   Started on tikosyn 500 mcg  ms after 2nd dose   Acute bleeding from loop site. S/p bovie and re-sutured at bedside by Dr. Stephens. Will give abx x 1

## 2024-09-26 NOTE — UTILIZATION REVIEW
NOTIFICATION OF INPATIENT ADMISSION   AUTHORIZATION REQUEST   SERVICING FACILITY:   [facility]  Address: [address] [place] [address]  Tax ID: [id number]  NPI: [id number] ATTENDING PROVIDER:  Attending Name and NPI#: Cayetano Stephens Md [[id number]]  Address: [address] [place] [address]  Phone: [phone]   ADMISSION INFORMATION:  Place of Service: Inpatient [facility]  Place of Service Code: 21  Inpatient Admission Date/Time: 9/25/24  9:32 AM  Discharge Date/Time: No discharge date for patient encounter.  Admitting Diagnosis Code/Description:  Longstanding persistent atrial fibrillation (HCC) [I48.11]  New onset atrial fibrillation (HCC) [I48.91]     UTILIZATION REVIEW CONTACT:  Solomon Nicholson Utilization   Network Utilization Review Department  Phone: [phone]  Fax: [phone]  Email: [email or web address]  Contact for approvals/pending authorizations, clinical reviews, and discharge.     PHYSICIAN ADVISORY SERVICES:  Medical Necessity Denial & Olhk-qh-Fbef Review  Phone: [phone]  Fax: [phone]  Email: [email or web address]     DISCHARGE SUPPORT TEAM:  For Patients Discharge Needs & Updates  Phone: [phone] opt. 2 Fax: [phone]  Email: [email or web address]

## 2024-09-26 NOTE — DISCHARGE INSTR - AVS FIRST PAGE
Medication Plan:    Take:   Eliquis 5mg twice daily.  Do not stop blood thinner until discussion with provider at post op appointment.   Dofetilide 500mcg every 12 hours   Change metoprolol to 50 mg twice daily instead of 100 mg     Post Procedure Care instructions:    For 7 days:  You may shower after the first 24 hours   Allow gentle soap and water to wash over incision.  Do not scrub. Pat to dry   If chaffing with undergarments, wear bandage during the day to prevent irritation. Maximum 5 days and change bandage daily.  Do not use lotions/powders/creams    You may walk or go up/down stairs      Restrictions for 1 week:   Do not lift greater than 10lbs    Avoid running/jumping    No submerging wound in water (No bath/hot tubs/pools/etc)    Normal healing process   You may have bruising that extends into your genitalia and/or down your thigh toward the knee. This can take several weeks to resolve.  Pain may worsen over the first 48 hours as you increase your activity and then will improve   Tylenol (acetaminophen)  Ice   You may have a small pea sized lump   Your arrhythmia may reoccur in the next 3 months  This is due to inflammation/irritation from the procedure    When to call clinic:    Redness or swelling at incision site   Bleeding or expanding lump in groin    Opening and/or drainage from the incision   Temperature >100.4 F   If your arrhythmia reoccurs call if:  You are symptomatic (dizziness/lightheadedness/heart racing/etc)    If you have any questions:   [phone] 8:30am-5:30pm  [phone] After hours  [phone] Appointments   Discharge Medication Instructions:    New medication instructions:   Take medication at the same time every day, 12 hours apart    If you are more than 2 hours late taking your medication, skip that dose.  Resume your regular timing.    Do not stop taking the medication until your doctor tells you to stop.    Do not take 2 doses at the same time.   Speak with a pharmacist  or your cardiologist prior to starting the new medication.  Medication interactions must be evaluated with your antiarrythmic medication.    Common medication classes that interact:    Antibiotics   Antifungals   Antianxiety/antidepressants       Discharge follow up:    1- week EKG/Nurse visit at a Cardiology office    If you filled your prescription at the hospital pharmacy, please ask the office to transfer your prescription to your home pharmacy   Provider follow up appointment at 4-6 weeks     If you have any questions:   [phone] 8:30am-4pm  [phone] After hours  [phone] Appointments   Post Procedure Care instructions:     Pain management   Tylenol (acetaminophen) and ice        First 7 days:  Bandage must remain on wound for first 48 hours then you may take it off  Do not use lotions, powders, creams, or ointments on incision  Bathing:    Place waterproof bandage over top when showering   Avoid water directly hitting bandage   Do not soak incision   After 7 days:  If glue is present:  Avoid picking at the glue or peeling it off, the glue will come off on its own  If stiches present:   Leave in place, they will be removed at your 2 week follow up appointment      When to call clinic:    Redness or swelling at incision site   Opening and/or drainage from the incision   Temperature >100.4 F     If you have any questions:   [phone] 8:30am-5:30pm  [phone] After hours  [phone] Appointments     Information about your device:     WHAT YOU SHOULD KNOW:    A cardiac loop recorder is a device used to diagnose heart rhythm problems, such as a fast or irregular heartbeat. It is implanted in your left chest, just under the skin. The device records a pattern of your heart's rhythm, called an EKG. Your device records automatic EKGs, depending on how your caregiver programs it. You may also receive a handheld controller. You press a button on the controller when you have symptoms, such as dizziness,  lightheadedness, or palpitations. The device will record an EKG at that moment. The recording can help your caregiver see if your symptoms may be caused by heart rhythm problems. Your caregiver will remove the device after it has collected enough data. You may need the device for up to 3 years. The procedure to remove the device is similar to the procedure used to implant it.

## 2024-09-26 NOTE — PROGRESS NOTES
"Progress Note - Electrophysiology   Name: Antonio cMkeon 53 y.o. male I MRN: [id number]  Unit/Bed#: [facility] 431-01 I Date of Admission: 9/25/2024   Date of Service: 9/26/2024 I Hospital Day: 1     Assessment & Plan  Atrial fibrillation (HCC)  New onset June 2024 admitted for RVR found to have EF 40%  S/p DCCV 5/31  Started on metoprolol, and lisinopril for GDMT currently on metoprolol 100 mg twice daily for rate control  Eliquis 5 mg twice daily for C2 V score 2  Mild biatrial dilation on echo 5/31  S/p PFA PVI, PW a fib ablation and loop implant   Started on tikosyn 500 mcg  ms after 2nd dose   Acute bleeding from loop site. S/p bovie and re-sutured at bedside by Dr. Stephens. 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 Remains in sinus rhythm.     EKG:       Objective:  Vitals: /77 (BP Location: Right arm)   Pulse 69   Temp (!) 97.2 °F (36.2 °C) (Oral)   Resp 20   Ht 5' 11\" (1.803 m)   Wt 105 kg (232 lb)   SpO2 97%   BMI 32.36 kg/m²     Vitals:    09/25/24 0903   Weight: 105 kg (232 lb)     Orthostatic Blood " Pressures      Flowsheet Row Most Recent Value   Blood Pressure 123/77 filed at 09/26/2024 0712   Patient Position - Orthostatic VS Lying filed at 09/26/2024 0712              Intake/Output Summary (Last 24 hours) at 9/26/2024 1052  Last data filed at 9/26/2024 0900  Gross per 24 hour   Intake 2140 ml   Output 475 ml   Net 1665 ml       Invasive Devices       Peripheral Intravenous Line  Duration             Peripheral IV 09/25/24 Left;Ventral (anterior) Forearm 1 day    Peripheral IV 09/25/24 Right Antecubital 1 day                              Scheduled Meds:  Current Facility-Administered Medications   Medication Dose Route Frequency Provider Last Rate    acetaminophen  650 mg Oral Q4H PRN Sergio Coffey PA-C      apixaban  5 mg Oral BID Sergio Coffey PA-C      dofetilide  500 mcg Oral Q12H FEMI Sergio Coffey PA-C      lisinopril  5 mg Oral Daily Sergio Coffey PA-C      metoprolol succinate  50 mg Oral Daily Sergio Coffey PA-C      pantoprazole  40 mg Oral Early Morning Sergio Coffey PA-C      sodium chloride  20 mL/hr Intravenous Continuous Mandy Damari Tomas PA-C 20 mL/hr (09/25/24 0852)     Continuous Infusions:sodium chloride, 20 mL/hr, Last Rate: 20 mL/hr (09/25/24 0852)      PRN Meds:.  acetaminophen    Review of Systems:  Review of Systems   Constitutional: Negative for fever and malaise/fatigue.   Cardiovascular:  Negative for chest pain, dyspnea on exertion, irregular heartbeat, near-syncope and palpitations.   Respiratory:  Negative for shortness of breath.    Neurological:  Negative for dizziness and light-headedness.   All other systems reviewed and are negative.    ROS as noted above, otherwise 12 point review of systems was performed and is negative.     Physical Exam:   Physical Exam  Vitals and nursing note reviewed.   Constitutional:       General: He is not in acute distress.  HENT:      Head: Normocephalic and atraumatic.   Cardiovascular:      Rate and Rhythm: Normal rate and  regular rhythm.      Comments: Loop site with oozing. S/p bovie and resuture with hemostasis. Wound redressed  Pulmonary:      Effort: Pulmonary effort is normal.      Breath sounds: Normal breath sounds.   Musculoskeletal:      Right lower leg: No edema.      Left lower leg: No edema.   Skin:     General: Skin is warm and dry.   Neurological:      Mental Status: He is alert.                   Lab Results: I have personally reviewed pertinent lab results.    Results from last 7 days   Lab Units 09/26/24  0605 09/25/24  0851 09/20/24  1211   WBC Thousand/uL 10.47* 6.89 7.45   HEMOGLOBIN g/dL 12.0 14.0 14.6   HEMATOCRIT % 36.8 41.5 44.7   PLATELETS Thousands/uL 240 233 263     Results from last 7 days   Lab Units 09/26/24  0605 09/25/24  0851 09/20/24  1211   POTASSIUM mmol/L 3.9 4.4 4.4   CHLORIDE mmol/L 107 108 106   CO2 mmol/L 27 28 29   BUN mg/dL 12 12 15   CREATININE mg/dL 0.93 0.95 1.02   CALCIUM mg/dL 8.5 9.0 9.2     Results from last 7 days   Lab Units 09/25/24  0851   INR  1.15           Imaging: No pertinent imaging studies reviewed.  No results found for this or any previous visit.      VTE Pharmacologic Prophylaxis: eliquis  VTE Mechanical Prophylaxis: sequential compression device

## 2024-09-26 NOTE — PLAN OF CARE
Problem: PAIN - ADULT  Goal: Verbalizes/displays adequate comfort level or baseline comfort level  Description: Interventions:  - Encourage patient to monitor pain and request assistance  - Assess pain using appropriate pain scale  - Administer analgesics based on type and severity of pain and evaluate response  - Implement non-pharmacological measures as appropriate and evaluate response  - Consider cultural and social influences on pain and pain management  - Notify physician/advanced practitioner if interventions unsuccessful or patient reports new pain  Outcome: Progressing     Problem: INFECTION - ADULT  Goal: Absence or prevention of progression during hospitalization  Description: INTERVENTIONS:  - Assess and monitor for signs and symptoms of infection  - Monitor lab/diagnostic results  - Monitor all insertion sites, i.e. indwelling lines, tubes, and drains  - Monitor endotracheal if appropriate and nasal secretions for changes in amount and color  - [place] appropriate cooling/warming therapies per order  - Administer medications as ordered  - Instruct and encourage patient and family to use good hand hygiene technique  - Identify and instruct in appropriate isolation precautions for identified infection/condition  Outcome: Progressing  Goal: Absence of fever/infection during neutropenic period  Description: INTERVENTIONS:  - Monitor WBC    Outcome: Progressing     Problem: SAFETY ADULT  Goal: Patient will remain free of falls  Description: INTERVENTIONS:  - Educate patient/family on patient safety including physical limitations  - Instruct patient to call for assistance with activity   - Consult OT/PT to assist with strengthening/mobility   - Keep Call bell within reach  - Keep bed low and locked with side rails adjusted as appropriate  - Keep care items and personal belongings within reach  - Initiate and maintain comfort rounds  - Make Fall Risk Sign visible to staff  - Offer Toileting every 4 Hours,  in advance of need  - Initiate/Maintain 4alarm  - Obtain necessary fall risk management equipment: 4  - Apply yellow socks and bracelet for high fall risk patients  - Consider moving patient to room near nurses station  Outcome: Progressing  Goal: Maintain or return to baseline ADL function  Description: INTERVENTIONS:  -  Assess patient's ability to carry out ADLs; assess patient's baseline for ADL function and identify physical deficits which impact ability to perform ADLs (bathing, care of mouth/teeth, toileting, grooming, dressing, etc.)  - Assess/evaluate cause of self-care deficits   - Assess range of motion  - Assess patient's mobility; develop plan if impaired  - Assess patient's need for assistive devices and provide as appropriate  - Encourage maximum independence but intervene and supervise when necessary  - Involve family in performance of ADLs  - Assess for home care needs following discharge   - Consider OT consult to assist with ADL evaluation and planning for discharge  - Provide patient education as appropriate  Outcome: Progressing  Goal: Maintains/Returns to pre admission functional level  Description: INTERVENTIONS:  - Perform AM-PAC 6 Click Basic Mobility/ Daily Activity assessment daily.  - Set and communicate daily mobility goal to care team and patient/family/caregiver.   - Collaborate with rehabilitation services on mobility goals if consulted  - Perform Range of Motion 4 times a day.  - Reposition patient every 4 hours.  - Dangle patient 4 times a day  - Stand patient 4 times a day  - Ambulate patient 4 times a day  - Out of bed to chair 4 times a day   - Out of bed for meals 4 times a day  - Out of bed for toileting  - Record patient progress and toleration of activity level   Outcome: Progressing     Problem: DISCHARGE PLANNING  Goal: Discharge to home or other facility with appropriate resources  Description: INTERVENTIONS:  - Identify barriers to discharge w/patient and caregiver  -  Arrange for needed discharge resources and transportation as appropriate  - Identify discharge learning needs (meds, wound care, etc.)  - Arrange for interpretive services to assist at discharge as needed  - Refer to Case Management Department for coordinating discharge planning if the patient needs post-hospital services based on physician/advanced practitioner order or complex needs related to functional status, cognitive ability, or social support system  Outcome: Progressing     Problem: Knowledge Deficit  Goal: Patient/family/caregiver demonstrates understanding of disease process, treatment plan, medications, and discharge instructions  Description: Complete learning assessment and assess knowledge base.  Interventions:  - Provide teaching at level of understanding  - Provide teaching via preferred learning methods  Outcome: Progressing

## 2024-09-26 NOTE — UTILIZATION REVIEW
Initial Clinical Review    Admission: Date/Time/Statement:   Admission Orders (From admission, onward)       Ordered        09/25/24 0932  Inpatient Admission  Once                          Orders Placed This Encounter   Procedures    Inpatient Admission     Standing Status:   Standing     Number of Occurrences:   1     Order Specific Question:   Level of Care     Answer:   Med Surg [16]     Order Specific Question:   Estimated length of stay     Answer:   More than 2 Midnights     Order Specific Question:   Certification     Answer:   I certify that inpatient services are medically necessary for this patient for a duration of greater than two midnights. See H&P and MD Progress Notes for additional information about the patient's course of treatment.         Initial Presentation: 53 y.o. male direct admit for elective atrial fibrillation ablation as well as loop recorder implant and medical admission with dofetilide loading. May 2024 dx w/ afib w/ RVR . Underwent SPENCER and cardioversion 5/31 . Echo w/ newly reduced EF to 40% with mild biatrial dilation. Cardiac catheterization neg for obstructive coronary artery disease. Started on GDMT with metoprolol, and lisinopril. Seen by EP in OP setting and  was felt  could be related to tachycardia related cardiomyopathy ,was recommended for atrial fibrillation ablation as well as antiarrhythmic start with loop recorder implantation for further monitoring. Admitted IP status w/ afib , HF . Plan for eliquis , tele , lopressor , lisinopril . Monitor Cr while on tikosyn .     Date: 9/26   Day 2:     9/26 Electrophysiology Note   S/p PFA PVI, PW a fib ablation and loop implant   Started on tikosyn 500 mcg  ms after 2nd dose . Acute bleeding from loop site. S/p bovie and re-sutured. IV abx x1. Wound redressed . Wbc 10.47 inc from 6.89.          Scheduled Medications:  apixaban, 5 mg, Oral, BID  dofetilide, 500 mcg, Oral, Q12H FEMI  lisinopril, 5 mg, Oral, Daily  metoprolol  succinate, 50 mg, Oral, Daily  pantoprazole, 40 mg, Oral, Early Morning      Continuous IV Infusions:  sodium chloride, 20 mL/hr, Intravenous, Continuous      PRN Meds:  acetaminophen, 650 mg, Oral, Q4H PRN      ED Triage Vitals   Temperature Pulse Respirations Blood Pressure SpO2 Pain Score   09/25/24 0903 09/25/24 0903 09/25/24 0903 09/25/24 0903 09/25/24 0903 09/25/24 1355   98.7 °F (37.1 °C) 87 16 (!) 164/101 96 % No Pain     Weight (last 2 days)       Date/Time Weight    09/25/24 0903 105 (232)            Vital Signs (last 3 days)       Date/Time Temp Pulse Resp BP MAP (mmHg) SpO2 O2 Flow Rate (L/min) O2 Device Patient Position - Orthostatic VS Garner Coma Scale Score Pain    09/26/24 07:12:22 97.2 °F (36.2 °C) 69 20 123/77 92 97 % -- -- -- -- --    09/26/24 02:39:35 97.6 °F (36.4 °C) 76 19 127/82 97 94 % -- -- -- -- --    09/25/24 22:44:09 97.9 °F (36.6 °C) 87 16 -- -- 95 % -- None (Room air) Lying -- --    09/25/24 2200 -- 86 -- 136/85 102 95 % -- -- -- -- --    09/25/24 2100 -- 91 -- 143/92 109 95 % -- -- -- -- 7    09/25/24 2030 -- 95 -- 146/91 109 93 % -- -- -- -- --    09/25/24 2000 -- 94 -- 145/93 110 95 % -- None (Room air) -- -- --    09/25/24 1949 -- -- -- -- -- -- -- -- -- 15 --    09/25/24 1747 -- -- -- -- -- -- -- -- -- 15 --    09/25/24 1630 -- -- -- -- -- -- -- -- -- 15 --    09/25/24 1600 -- -- -- -- -- -- -- -- -- 15 --    09/25/24 1525 -- -- -- -- -- -- -- -- -- 15 --    09/25/24 1509 -- -- -- -- -- -- -- None (Room air) -- 15 6    09/25/24 15:05:09 97.3 °F (36.3 °C) 69 16 125/84 98 97 % -- None (Room air) Lying -- --    09/25/24 1430 -- 69 21 131/78 100 96 % -- None (Room air) -- 15 No Pain    09/25/24 1415 -- 69 14 119/68 88 95 % -- None (Room air) -- -- No Pain    09/25/24 1400 -- 69 23 119/69 88 97 % -- None (Room air) -- -- No Pain    09/25/24 1355 97.4 °F (36.3 °C) 72 23 127/76 95 99 % 6 L/min Simple mask -- 14 No Pain    09/25/24 0903 98.7 °F (37.1 °C) 87 16 164/101 126 96 % -- None  (Room air) Lying -- --              Pertinent Labs/Diagnostic Test Results:   Radiology:  No orders to display     Cardiology:  ECG 12 lead   Final Result by Vicente Hoover DO (09/26 0728)   Normal sinus rhythm   Normal ECG   When compared with ECG of 25-SEP-2024 15:38, (unconfirmed)   No significant change was found   Confirmed by Vicente Hoover ([id number]) on 9/26/2024 7:27:59 AM      ECG 12 lead   Final Result by Vicente Hoover DO (09/26 0727)   Normal sinus rhythm   Normal ECG   When compared with ECG of 25-SEP-2024 13:57, (unconfirmed)   No significant change was found   Confirmed by Vicente Hoover ([id number]) on 9/26/2024 7:27:57 AM      Cardiac ep lab eps/ablations   Final Result by Cayetano Stephens MD (09/25 2297)   Images from the original result were not included.   RAGHAV PULSE ablation             History and physical were reviewed   Patient was examined and history was reviewed   No change in patient's condition Since history and physical has been    completed             Pre- operative diagnosis:   Persistent atrial fibrillation, tachycardia mediated cardiomyopathy   DC cardioversion, back in A-fib with inadequate rate control            Postoperative diagnosis:   Persistent atrial fibrillation, tachycardia mediated cardiomyopathy   DC cardioversion, back in A-fib with inadequate rate control                Procedure:   1.Pulse field ablation of atrial fibrillation     Ablation 1-pulmonary vein isolation   Ablation 2-left atrial posterior wall isolation      2. Limited electrophysiologic measurement    3. 3-D electro-anatomic mapping using NavX system   4. Intracardiac echocardiography   5. Transeptal   6. Access under USG with seldinger technique   7. DC cardioversion      8.  Loop implantation                      Surgeon: Cayetano Stephens        Assistants -none        Specimens - none        Estimated blood loss- 30 ml        Findings-none        Complications- none        Anesthesia- general  anesthesia, local lidocaine by myself            SPENCER   Dr mireles LVEF has improved   No BATSHEVA clot           INFORMED CONSENT:    Risks, benefits, and alternatives to atrial fibrillation ablation and    associated procedures discussed.    The patient understood risks include but not limited to death, esophageal    injury, phrenic nerve injury (diaphragm), stroke, bleeding and vascular    complication, and bleeding around the heart.           Reason for using Grace pulse   Detailed discussion done with  2 nd EP   Pulse Field Ablation is recommended for this patient   Considering his age we would not want to risk PV Stenosis /  Esophageal    injury /  phrenic injury with traditional RF or Cryo ablation   I strongly recommend Pulsed Field ablation as the only and safest modality    to treat his condition. This is FDA Approved, it is not experimental at    this point.   Please refer to Jew trial and PersAFone trials for example Rico et al    2023 and JAC 2020 and 2023 [facility].                Details of the procedure        Sheaths used   All access was obtained under ultrasound guidance   A. Right femoral vein- 16 F for Grace cross,5F for wire    B. Left femoral vein- 9 F for ICE,  8 F for CS             Catheters used   1. Coronary sinus catheter - [employer] bill FJ curve   2. Ablation catheter - Grace Pulse catheter    3. ICE - Standard Acuson   4. Grid mapping catheter              Imaging systems used   1. Fluoroscopy     Right anterior oblique views were used whenever we needed to determine    between anterior and posterior   Left anterior oblique views were used whenever we needed to determine    between right and left   2.Electrotomy mapping - 3D electro-anatomic mapping was done by the NAVX    System. This was also used whenever catheter was moved. The His bundle was    marked.   3. ICE -for transeptal and thereafter to look at veins and appendage.    Intermittently to look at pericardial space              Anticoagulation:   Heparin was used for anticoagulation to keep ACT in the therapeutic range    350-400ms        Correction at end of ablation :protamine 80                       Step 1 : Farapulse ablation       Details of the ablation     The patient has been explained the multiple procedures which were planned   Proper consent has been obtained   Patient was brought to the electrophysiologic laboratory   Proper time out was done   General anesthesia was done    Patient was draped in a sterile fashion        All access obtained as described above   All sheaths placed as described above   Heparin was used as described above, and once ACT was over 350 we went    ahead with transseptal             Transeptal    Thereafter we went ahead with transseptal   A Versa cross sheath and RF used for septal puncture   Septal puncture was obtained under both fluoroscopy and intracardiac ice    guidance   Position was confirmed and then Grace cross  sheath was taken to the left    side         Mapping    This was done with a grid catheter   Detailed mapping done of the pulmonary veins and left atria                Grace pulse  -sequence   Once we were on the left side, the sequence of ablation were as follows   Left superior pulmonary vein   Left inferior pulmonary vein   Right superior pulmonary vein   Right inferior pulmonary vein                  Grace pulse  - process       Ablation 1  : Pulmonary vein isolation - PVI   For each of the veins the following sequence were followed   An electroanatomic map was created with the grid catheter   Grid exchanged out and farapulse placed in LA   For each vein -initially wire in the vein - grace pulse passed over the    wire -catheter deployed initially as basket mode and thereafter as flower    mode -for each mode , 2 deliveries at baseline and subsequent 2 deliveries    after rotation   Before each delivery confirmed contact using ice and fluoroscopy         Ablation 2  : Left atrial  Posterior wall isolation    Grace pulse catheter in flower form moved across posterior wall    - stamps superiorly   - stamps inferiorly      Moved clockwise from the left  sided pulmonary veins   Moved counterclockwise from the right-sided pulmonary veins   Made sure that there is at least 50% overlap between consecutive lesions    on the posterior wall      Detailed map redone with the grid catheter               DCCV   The patient remained in atrial fibrillation even after PVI and left atrial    posterior wall isolation   DC cardioversion done - 150 J and 200 J failed through the pads ;    Subsequently paddle was used, applied with manual pressure,  and was able    to cardioverted at 200 J       Re mapping done with grid catheter -showed complete ablation of pulmonary    veins and left atrial posterior wall                 Grace pulse ablation- details for ablation                                        3D electro anatomic map post ablation       Baseline electroanatomic map-voltage map                   Repeat electroanatomic map post ablation                                 Step 2: Limited electrophysiologic study                             Completion of procedure   The patient was given 80 mg of protamine   All sheaths were withdrawn after sutures were placed    There is adequate hemostasis   Patient was woken up from general anesthesia   The patient is neurologically intact                       Summary of procedure :   1. Successful grace pulse ablation  for fibrillation was done    Pulmonary vein isolation   Left atrial posterior wall isolation      2.  Limited electrophysiologic measurements                   Recommendation:   Remove sutures in 24 hours before discharge   Start blood thinners in 4 hours                                               IMPLANTATION of   LINQ      History and physical were reviewed   Patient was examined and history was reviewed   No change in patient's condition Since history  and physical has been    completed            The pre- operative diagnosis:   Patient has persistent A-fib, tachycardia mediated cardiomyopathy,    relatively asymptomatic till developed symptoms of low heart function   Linq to monitor for A-fib control, further antiarrhythmic therapy,    anticoagulation         Postoperative diagnosis:   Patient has persistent A-fib, tachycardia mediated cardiomyopathy,    relatively asymptomatic till developed symptoms of low heart function   Linq to monitor for A-fib control, further antiarrhythmic therapy,    anticoagulation         Procedure:   LINQ         Surgeon: Cayetano Stephens      Assistants -none      Specimens - none      Estimated blood loss- 5 ml      Findings-none      Complications none      Anesthesia-  local lidocaine by myself         Details of the device         Description of procedure:      The patient was seen before the procedure. The details of the procedure    was explained and patient agreed to the same. Appropriate consent was    signed.      The patient has already undergone atrial fibrillation ablation      The patient was brought to the electrophysiologic laboratory. Proper time    out was done. Sterile dressing and draping was done. Local lidocaine was    infiltrated, in the left second intercostal space, about 2 cm lateral to    the sternal edge.      Using the stab knife an incision was made. Thereafter the  was    used, moved around to form a pocket, along the long axis of the heart, in    the second intercostal space region. Then plunger was used to deploy the    device. The plunger was disengaged and removed. The  was pulled    back. Pressure was held and hemostasis was obtained with sutures.       Dressing was done with  Telfa and Tegaderm         Summary of the procedure:   The patient came in to the laboratory for linq device placement. The    device has been placed and patient tolerated the procedure well                             ECG 12 lead   Final Result by Vicente Hoover DO (09/26 0727)   Atrial fibrillation   Incomplete right bundle branch block   Abnormal ECG   When compared with ECG of 31-MAY-2024 14:04,   Atrial fibrillation has replaced Sinus rhythm   T wave amplitude has increased in Anterior leads   Confirmed by Vicente Hoover ([id number]) on 9/26/2024 7:27:55 AM        GI:  No orders to display           Results from last 7 days   Lab Units 09/26/24  0605 09/25/24  0851 09/20/24  1211   WBC Thousand/uL 10.47* 6.89 7.45   HEMOGLOBIN g/dL 12.0 14.0 14.6   HEMATOCRIT % 36.8 41.5 44.7   PLATELETS Thousands/uL 240 233 263   TOTAL NEUT ABS Thousands/µL  --   --  4.61         Results from last 7 days   Lab Units 09/26/24  0605 09/25/24  0851 09/20/24  1211   SODIUM mmol/L 141 141 141   POTASSIUM mmol/L 3.9 4.4 4.4   CHLORIDE mmol/L 107 108 106   CO2 mmol/L 27 28 29   ANION GAP mmol/L 7 5 6   BUN mg/dL 12 12 15   CREATININE mg/dL 0.93 0.95 1.02   EGFR ml/min/1.73sq m 92 89 82   CALCIUM mg/dL 8.5 9.0 9.2     Results from last 7 days   Lab Units 09/20/24  1211   AST U/L 18   ALT U/L 18   ALK PHOS U/L 72   TOTAL PROTEIN g/dL 6.9   ALBUMIN g/dL 4.0   TOTAL BILIRUBIN mg/dL 0.97     Results from last 7 days   Lab Units 09/26/24  0605 09/25/24  0851 09/20/24  1211   GLUCOSE RANDOM mg/dL 119 91 100       Results from last 7 days   Lab Units 09/25/24  0851   PROTIME seconds 15.0   INR  1.15     Past Medical History:   Diagnosis Date    Heart disease     Hypertension      Present on Admission:   Atrial fibrillation (HCC)      Admitting Diagnosis: Longstanding persistent atrial fibrillation (HCC) [I48.11]  New onset atrial fibrillation (HCC) [I48.91]  Age/Sex: 53 y.o. male    Network Utilization Review Department  ATTENTION: Please call with any questions or concerns to [phone] and carefully listen to the prompts so that you are directed to the right person. All voicemails are confidential.   For Discharge needs, contact Care  Management DC Support Team at [phone] opt. 2  Send all requests for admission clinical reviews, approved or denied determinations and any other requests to dedicated fax number below belonging to the campus where the patient is receiving treatment. List of dedicated fax numbers for the Facilities:  FACILITY NAME UR FAX NUMBER   ADMISSION DENIALS (Administrative/Medical Necessity) [phone]   DISCHARGE SUPPORT TEAM (NETWORK) [phone]   PARENT CHILD HEALTH (Maternity/NICU/Pediatrics) [phone]   [facility] [phone]   [facility] [phone]   [facility] [phone]   [facility] [phone]   [facility] [phone]   [facility] [phone]   [facility] [phone]   [facility] [phone]   [facility] [phone]   [facility] [phone]   [facility] [phone]   [facility] [phone]

## 2024-09-26 NOTE — ASSESSMENT & PLAN NOTE
Lab Results   Component Value Date    EGFR 92 09/26/2024    EGFR 89 09/25/2024    EGFR 82 09/20/2024    CREATININE 0.93 09/26/2024    CREATININE 0.95 09/25/2024    CREATININE 1.02 09/20/2024   Creatinine today stable  Continue to monitor while on Tikosyn

## 2024-09-27 ENCOUNTER — TRANSITIONAL CARE MANAGEMENT (OUTPATIENT)
Dept: FAMILY MEDICINE CLINIC | Facility: CLINIC | Age: 54
End: 2024-09-27

## 2024-09-27 VITALS
DIASTOLIC BLOOD PRESSURE: 85 MMHG | WEIGHT: 232 LBS | HEART RATE: 63 BPM | TEMPERATURE: 97.7 F | RESPIRATION RATE: 17 BRPM | OXYGEN SATURATION: 97 % | SYSTOLIC BLOOD PRESSURE: 137 MMHG | HEIGHT: 71 IN | BODY MASS INDEX: 32.48 KG/M2

## 2024-09-27 LAB
ANION GAP SERPL CALCULATED.3IONS-SCNC: 5 MMOL/L (ref 4–13)
ATRIAL RATE: 65 BPM
BUN SERPL-MCNC: 10 MG/DL (ref 5–25)
CALCIUM SERPL-MCNC: 8.8 MG/DL (ref 8.4–10.2)
CHLORIDE SERPL-SCNC: 107 MMOL/L (ref 96–108)
CO2 SERPL-SCNC: 28 MMOL/L (ref 21–32)
CREAT SERPL-MCNC: 0.91 MG/DL (ref 0.6–1.3)
ERYTHROCYTE [DISTWIDTH] IN BLOOD BY AUTOMATED COUNT: 16.8 % (ref 11.6–15.1)
GFR SERPL CREATININE-BSD FRML MDRD: 94 ML/MIN/1.73SQ M
GLUCOSE SERPL-MCNC: 99 MG/DL (ref 65–140)
HCT VFR BLD AUTO: 35.8 % (ref 36.5–49.3)
HGB BLD-MCNC: 11.5 G/DL (ref 12–17)
MCH RBC QN AUTO: 29 PG (ref 26.8–34.3)
MCHC RBC AUTO-ENTMCNC: 32.1 G/DL (ref 31.4–37.4)
MCV RBC AUTO: 90 FL (ref 82–98)
P AXIS: 58 DEGREES
PLATELET # BLD AUTO: 199 THOUSANDS/UL (ref 149–390)
PMV BLD AUTO: 10 FL (ref 8.9–12.7)
POTASSIUM SERPL-SCNC: 3.9 MMOL/L (ref 3.5–5.3)
PR INTERVAL: 154 MS
QRS AXIS: -16 DEGREES
QRSD INTERVAL: 110 MS
QT INTERVAL: 416 MS
QTC INTERVAL: 432 MS
RBC # BLD AUTO: 3.96 MILLION/UL (ref 3.88–5.62)
SODIUM SERPL-SCNC: 140 MMOL/L (ref 135–147)
T WAVE AXIS: 30 DEGREES
VENTRICULAR RATE: 65 BPM
WBC # BLD AUTO: 7.58 THOUSAND/UL (ref 4.31–10.16)

## 2024-09-27 PROCEDURE — 93005 ELECTROCARDIOGRAM TRACING: CPT

## 2024-09-27 PROCEDURE — 99239 HOSP IP/OBS DSCHRG MGMT >30: CPT | Performed by: PHYSICIAN ASSISTANT

## 2024-09-27 PROCEDURE — 93010 ELECTROCARDIOGRAM REPORT: CPT | Performed by: INTERNAL MEDICINE

## 2024-09-27 PROCEDURE — 85027 COMPLETE CBC AUTOMATED: CPT | Performed by: PHYSICIAN ASSISTANT

## 2024-09-27 PROCEDURE — 80048 BASIC METABOLIC PNL TOTAL CA: CPT | Performed by: PHYSICIAN ASSISTANT

## 2024-09-27 RX ORDER — DOFETILIDE 0.5 MG/1
500 CAPSULE ORAL 2 TIMES DAILY
Qty: 60 CAPSULE | Refills: 0 | Status: CANCELLED | OUTPATIENT
Start: 2024-09-27

## 2024-09-27 RX ORDER — METOPROLOL SUCCINATE 50 MG/1
50 TABLET, EXTENDED RELEASE ORAL DAILY
Qty: 60 TABLET | Refills: 0 | Status: SHIPPED | OUTPATIENT
Start: 2024-09-28

## 2024-09-27 RX ORDER — DOFETILIDE 0.5 MG/1
500 CAPSULE ORAL EVERY 12 HOURS SCHEDULED
Qty: 60 CAPSULE | Refills: 2 | Status: CANCELLED | OUTPATIENT
Start: 2024-10-27

## 2024-09-27 RX ORDER — DOFETILIDE 0.5 MG/1
500 CAPSULE ORAL EVERY 12 HOURS SCHEDULED
Qty: 60 CAPSULE | Refills: 2 | Status: SHIPPED | OUTPATIENT
Start: 2024-10-27

## 2024-09-27 RX ORDER — DOFETILIDE 0.5 MG/1
500 CAPSULE ORAL 2 TIMES DAILY
Qty: 60 CAPSULE | Refills: 0 | Status: SHIPPED | OUTPATIENT
Start: 2024-09-27

## 2024-09-27 RX ADMIN — PANTOPRAZOLE SODIUM 40 MG: 40 TABLET, DELAYED RELEASE ORAL at 06:13

## 2024-09-27 RX ADMIN — APIXABAN 5 MG: 5 TABLET, FILM COATED ORAL at 09:28

## 2024-09-27 RX ADMIN — LISINOPRIL 5 MG: 5 TABLET ORAL at 09:28

## 2024-09-27 RX ADMIN — DOFETILIDE 500 MCG: 500 CAPSULE ORAL at 06:13

## 2024-09-27 RX ADMIN — METOPROLOL SUCCINATE 50 MG: 50 TABLET, EXTENDED RELEASE ORAL at 09:28

## 2024-09-27 NOTE — PROGRESS NOTES
Patient is AxOx4 & is on RA. VSS. Patient denies pain/discomfort. Patient is standby assist for care. Bed is in lowest position. All needs are within reach.

## 2024-09-27 NOTE — ASSESSMENT & PLAN NOTE
Wt Readings from Last 3 Encounters:   09/25/24 105 kg (232 lb)   09/25/24 105 kg (232 lb)   09/04/24 105 kg (231 lb)   Noted new onset in May 2024 after presenting in new onset rapid atrial fibrillation  Echocardiogram at that time with EF 40% with grade 2 diastolic dysfunction with mild biatrial dilation  9/25 ECHO EF 50%   GDMT with metoprolol, and lisinopril  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  Cardiac catheterization 6/3/2024 without coronary artery disease

## 2024-09-27 NOTE — ASSESSMENT & PLAN NOTE
Lab Results   Component Value Date    EGFR 94 09/27/2024    EGFR 92 09/26/2024    EGFR 89 09/25/2024    CREATININE 0.91 09/27/2024    CREATININE 0.93 09/26/2024    CREATININE 0.95 09/25/2024   Creatinine stable

## 2024-09-27 NOTE — DISCHARGE SUMMARY
Discharge Summary - Electrophysiology   Name: Antonio Pond y.o. male I MRN: [id number]  Unit/Bed#: [facility] 431-01 I Date of Admission: 9/25/2024   Date of Service: 9/27/2024 I Hospital Day: 2       Discharge Summary - Antonio Pond y.o. male MRN: [id number]    Unit/Bed#: [facility] 431-01 Encounter: [id number]      Admission Date: 9/25/2024   Discharge Date: 09/27/24    Discharge Diagnosis:   Assessment & Plan  Atrial fibrillation (HCC)  New onset June 2024 admitted for RVR found to have EF 40%  S/p DCCV 5/31  Started on metoprolol, and lisinopril for GDMT currently on metoprolol 100 mg twice daily for rate control  Eliquis 5 mg twice daily for C2 V score 2  Mild biatrial dilation on echo 5/31  S/p PFA PVI, PW a fib ablation and loop implant   AAD: tikosyn 500 mcg every 12 hours  Rate control Toprol-XL decreased to 50mg due to bradycardia   Acute bleeding from loop site. S/p bovie and re-sutured at bedside by Dr. 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 Limited electrophysiologic measurement   3. 3-D electro-anatomic mapping using NavX system  4. Intracardiac echocardiography  5. Transeptal  6. Access under USG with seldinger technique  7. DC cardioversion     8.  Loop implantation  Orders Placed This Encounter   Procedures    Cardiac ep lab eps/ablations       Consultants: None    HPI: Please refer to the initial history and physical as well as procedure notes for full details.     Hospital Course: Antonio Mckeon presented at his baseline state of health. After the procedure was explained in detail and consent was obtained, he underwent afib ablation without complications. He tolerated the procedure well. In the postprocedure setting, he was initiated on Tikosyn 500mcg based on his QTC interval and creatinine clearance.    On his first night, there were no acute issues or events overnight. The following morning he denied all cardiac complaints, including chest pain/pressure, dyspnea, palpitations, dizziness, lightheadedness, or syncope. His vital signs were reviewed and labs are stable. Telemetry showed sinus bradycardia and his Toprol-XL dose was reduced to 50mg daily from 100mg. His groins were soft without significant hematoma or recurrent bleeding.    He continued his stay in the hospital to finish undergoing antiarrhythmic therapy initiation of Tikosyn.  Upon starting this medication, he was in sinus rhythm. Patient underwent first 4 doses of AAD without complications. Serial EKGs were performed 2 hours after each dose of AAD.  There were no significant changes in QT/QTc with intiating doses.  There were no significant occurrence of ventricular ectopy on telemetry.  Electrolytes and renal function were monitored throughout their stay.    On the day of discharge, physical exam was as follows:    Physical Exam  Constitutional:       General: He is not in acute distress.     Appearance: He is not ill-appearing.   HENT:      Head: Normocephalic and  atraumatic.      Mouth/Throat:      Mouth: Mucous membranes are moist.   Cardiovascular:      Rate and Rhythm: Normal rate and regular rhythm.   Pulmonary:      Effort: Pulmonary effort is normal. No respiratory distress.   Skin:     General: Skin is warm and dry.   Neurological:      Mental Status: He is alert.           EKG on the day of discharge:       At time of discharge, patient was ambulating the cardiac unit without complaints of  lightheadedness, presyncope, syncope, chest pain, shortness of breath, orthopnea, PND, palpitations, or bleeding problems. Patient received education regarding Tikosyn - avoiding missed doses, pharmacy moitoring for drug to drug interactions, and concerning signs and symptoms that would prompt urgent evaluation.      He was given routine post ablation discharge instructions and restrictions, and these were explained in detail. He was given a one-week EKG appointment after starting Tikosyn along with a follow up with oNrris Coffey PA-C in 4-6 weeks in the office. He was also instructed to follow up with his primary cardiologist as previously instructed.    In terms of his medications  - Reduce Toprol-XL to 50mg daily  - Start Tikosyn 500mcg every 12 hours, one script was sent to [facility] pharmacy, the other the mail away pharmacy.  Patient is aware he should not start the mail order bottle until he finishes the first bottle. He understand he should never take two pills at the same time.   - Continue eliquis twice daily for AC    He is stable for discharge at this time with all questions answered. He was discussed in detail with Dr. Stephens who is in agreement with this discharge summary.     Discharge Medications:  See after visit summary for reconciled discharge medications provided to patient and family.        Pertininet Labs/diagnostics:  CBC with diff:   Results from last 7 days   Lab Units 09/27/24  0653 09/26/24  0605 09/25/24  0851 09/20/24  1211   WBC Thousand/uL 7.58  10.47* 6.89 7.45   HEMOGLOBIN g/dL 11.5* 12.0 14.0 14.6   HEMATOCRIT % 35.8* 36.8 41.5 44.7   MCV fL 90 89 86 86   PLATELETS Thousands/uL 199 240 233 263   RBC Million/uL 3.96 4.12 4.81 5.18   MCH pg 29.0 29.1 29.1 28.2   MCHC g/dL 32.1 32.6 33.7 32.7   RDW % 16.8* 16.7* 15.9* 16.1*   MPV fL 10.0 10.6 9.6 10.1   NRBC AUTO /100 WBCs  --   --   --  0       BMP:  Results from last 7 days   Lab Units 09/27/24  0653 09/26/24  0605 09/25/24  0851 09/20/24  1211   POTASSIUM mmol/L 3.9 3.9 4.4 4.4   CHLORIDE mmol/L 107 107 108 106   CO2 mmol/L 28 27 28 29   BUN mg/dL 10 12 12 15   CREATININE mg/dL 0.91 0.93 0.95 1.02   CALCIUM mg/dL 8.8 8.5 9.0 9.2       Magnesium:       Coags:   Results from last 7 days   Lab Units 09/25/24  0851   INR  1.15         Complications: none    Condition at Discharge: good     Discharge instructions/Information to patient and family:   See after visit summary for information provided to patient and family.      Provisions for Follow-Up Care:  See after visit summary for information related to follow-up care and any pertinent home health orders.      Disposition: Home    Planned Readmission: No    Discharge Statement   I spent 45 minutes minutes discharging the patient. This time was spent on the day of discharge. I had direct contact with the patient on the day of discharge. Additional documentation is required if more than 30 minutes were spent on discharge. Evaluating the incision, discussing discharge instructions and restrictions, arranging follow up appointments, discussing medications

## 2024-09-27 NOTE — PLAN OF CARE

## 2024-09-27 NOTE — ASSESSMENT & PLAN NOTE
New onset June 2024 admitted for RVR found to have EF 40%  S/p DCCV 5/31  Started on metoprolol, and lisinopril for GDMT currently on metoprolol 100 mg twice daily for rate control  Eliquis 5 mg twice daily for C2 V score 2  Mild biatrial dilation on echo 5/31  S/p PFA PVI, PW a fib ablation and loop implant   AAD: tikosyn 500 mcg every 12 hours  Rate control Toprol-XL decreased to 50mg due to bradycardia   Acute bleeding from loop site. S/p bovie and re-sutured at bedside by Dr. Stephens on 9/26

## 2024-09-28 LAB
ATRIAL RATE: 56 BPM
P AXIS: 65 DEGREES
PR INTERVAL: 146 MS
QRS AXIS: -18 DEGREES
QRSD INTERVAL: 114 MS
QT INTERVAL: 460 MS
QTC INTERVAL: 443 MS
T WAVE AXIS: 37 DEGREES
VENTRICULAR RATE: 56 BPM

## 2024-09-28 PROCEDURE — 93010 ELECTROCARDIOGRAM REPORT: CPT | Performed by: INTERNAL MEDICINE

## 2024-09-30 ENCOUNTER — TELEPHONE (OUTPATIENT)
Dept: PAIN MEDICINE | Facility: CLINIC | Age: 54
End: 2024-09-30

## 2024-09-30 DIAGNOSIS — Z91.89 HIGH RISK FOR READMISSION: Primary | ICD-10-CM

## 2024-09-30 NOTE — UTILIZATION REVIEW
NOTIFICATION OF ADMISSION DISCHARGE   This is a Notification of Discharge from [facility]. Please be advised that this patient has been discharge from our facility. Below you will find the admission and discharge date and time including the patient’s disposition.   UTILIZATION REVIEW CONTACT:  Citlalli Stewart  Utilization   Network Utilization Review Department  Phone: [phone] x carefully listen to the prompts. All voicemails are confidential.  Email: [email or web address]     ADMISSION INFORMATION  PRESENTATION DATE: 9/25/2024  8:09 AM  OBERVATION ADMISSION DATE: N/A  INPATIENT ADMISSION DATE: 9/25/24  9:32 AM   DISCHARGE DATE: 9/27/2024  1:31 PM   DISPOSITION:Home/Self Care    Network Utilization Review Department  ATTENTION: Please call with any questions or concerns to [phone] and carefully listen to the prompts so that you are directed to the right person. All voicemails are confidential.   For Discharge needs, contact Care Management DC Support Team at [phone] opt. 2  Send all requests for admission clinical reviews, approved or denied determinations and any other requests to dedicated fax number below belonging to the campus where the patient is receiving treatment. List of dedicated fax numbers for the Facilities:  FACILITY NAME UR FAX NUMBER   ADMISSION DENIALS (Administrative/Medical Necessity) [phone]   DISCHARGE SUPPORT TEAM ([facility]) [phone]   PARENT CHILD HEALTH (Maternity/NICU/Pediatrics) [phone]   [facility] [phone]   [facility] [phone]   [facility] [phone]   [facility] [phone]   Counts include 234 beds at the [facility] [phone]   [facility] [phone]   [facility] [phone]   [facility] [phone]    [facility] [phone]   [facility] [phone]   [facility] [phone]   [facility] [phone]

## 2024-10-01 ENCOUNTER — PATIENT OUTREACH (OUTPATIENT)
Dept: CASE MANAGEMENT | Facility: OTHER | Age: 54
End: 2024-10-01

## 2024-10-01 DIAGNOSIS — I10 PRIMARY HYPERTENSION: ICD-10-CM

## 2024-10-01 RX ORDER — LISINOPRIL 10 MG/1
10 TABLET ORAL DAILY
Qty: 90 TABLET | Refills: 0 | Status: SHIPPED | OUTPATIENT
Start: 2024-10-01

## 2024-10-01 NOTE — PROGRESS NOTES
Referral received from the HRR report. Chart reviewed.  Patient was admitted to [facility] 9/25-9/27 with Afib and had an ablation with loop implant on  9/25.  He discharged home to self care.  I spoke with Antonio who reports he feels well. He denies chest pain, palpitations or shortness of breath.  He is taking his medications as directed. I advised him not to miss any doses.   He has no pain or swelling at the site in his groin just some bruising.  He is taking it easy and I reeducated him on no heavy lifting. We reviewed the heart failure education he received including the red flags and when to call the doctor.  I advised him his discharge weigh was 232 pounds. He does weigh himself daily and this morning he was at 230 pounds.  Patient will drive himself to appointments.He will see cardiology on 10/4. He agreed to a call after that time.

## 2024-10-02 ENCOUNTER — APPOINTMENT (OUTPATIENT)
Dept: PHYSICAL THERAPY | Facility: CLINIC | Age: 54
End: 2024-10-02
Payer: COMMERCIAL

## 2024-10-02 NOTE — PROGRESS NOTES
Assessment:  1. Chronic bilateral low back pain without sciatica    2. Lumbar facet arthropathy    3. SI (sacroiliac) joint dysfunction        Plan:  Orders Placed This Encounter   Procedures    FL spine and pain procedure     Standing Status:   Future     Standing Expiration Date:   10/4/2028     Order Specific Question:   Reason for Exam:     Answer:   B/L L4-5 and L5-S1 MBB #1     Order Specific Question:   Anticoagulant hold needed?     Answer:   No       No orders of the defined types were placed in this encounter.      My impressions and treatment recommendations were discussed in detail with the patient, who verbalized understanding and had no further questions.    53-year-old presents her office with bilateral low back pain, right greater than left without radiation down the lower extremities.  On exam, no significant pain with lumbar flexion.  However does have some pain with lumbar extension and pain with facet loading bilaterally.  He also reports stiffness after periods of inactivity.  Signs and symptoms correlate with axial back pain secondary to facet arthritis.  He has trialed conservative management in the form of over-the-counter medications without much relief.  We discussed bilateral L4-5 and L5-S1 medial branch block followed by radiofrequency ablation of greater than 80% relief.    Will also provide patient with home exercise program for hte lower back to be performed for a period of at least 6 weeks.     [place] Prescription Drug Monitoring Program report was reviewed and was appropriate     Complete risks and benefits including bleeding, infection, tissue reaction, nerve injury and allergic reaction were discussed. The approach was demonstrated using models and literature was provided. Verbal and written consent was obtained.    Discharge instructions were provided. I personally saw and examined the patient and I agree with the above discussed plan of care.    History of Present Illness:     Antonio Mckeon is a 53 y.o. male who presents to [facility] for initial evaluation of the above stated pain complaints. The patient has a past medical and chronic pain history as outlined in the assessment section. He was referred by Johan Rae, DO  [address]  [address]  [place] [address] .    Chief complaint lower back pain for over 10 years.  Undetermined cause.  Moderate over the past month.  Occasional.  Worse in the evening.    Increased with standing, bending, walking, exercise.  Decreased with lying down, relaxation, coughing, sneezing.    He reports minimal pain now, but after periods of inactivity he has increased and pain. He recently had cardiac ablation where he was on the table for several hours and had severe pain afterwards.     Review of Systems:    Review of Systems   Musculoskeletal:  Positive for back pain and myalgias.           Past Medical History:   Diagnosis Date    Heart disease     Hypertension        Past Surgical History:   Procedure Laterality Date    CARDIAC CATHETERIZATION N/A 2010    CARDIAC CATHETERIZATION Left 6/3/2024    Procedure: Cardiac catheterization;  Surgeon: Rosalinda Novoa MD;  Location: [facility];  Service: Cardiology    CARDIAC CATHETERIZATION N/A 6/3/2024    Procedure: Cardiac Coronary Angiogram;  Surgeon: Rosalinda Novoa MD;  Location: [facility];  Service: Cardiology    CARDIAC ELECTROPHYSIOLOGY PROCEDURE N/A 9/25/2024    Procedure: Cardiac eps/afib ablation PFA;  Surgeon: Cayetano Stephens MD;  Location: [facility];  Service: Cardiology    CARDIAC ELECTROPHYSIOLOGY PROCEDURE N/A 9/25/2024    Procedure: Cardiac loop recorder implant;  Surgeon: Cayetano Stephens MD;  Location:  CARDIAC CATH LAB;  Service: Cardiology    KNEE ARTHROSCOPY W/ MENISCAL REPAIR Bilateral     ROTATOR CUFF REPAIR Left        Family History   Problem Relation Age of Onset    Diabetes Mother     Heart disease Mother  "    Rheum arthritis Mother     Alcohol abuse Father     Stomach cancer Father     Breast cancer Maternal Grandmother        Social History     Occupational History    Not on file   Tobacco Use    Smoking status: Never    Smokeless tobacco: Never   Vaping Use    Vaping status: Never Used   Substance and Sexual Activity    Alcohol use: Yes     Comment: sobor x1 mo 5/2022    Drug use: No    Sexual activity: Yes     Partners: Female         Current Outpatient Medications:     dofetilide (TIKOSYN) 500 mcg capsule, Take 1 capsule (500 mcg total) by mouth 2 (two) times a day, Disp: 60 capsule, Rfl: 0    Eliquis 5 MG, TAKE 1 TABLET(5 MG) BY MOUTH TWICE DAILY, Disp: 60 tablet, Rfl: 11    lisinopril (ZESTRIL) 10 mg tablet, TAKE 1 TABLET(10 MG) BY MOUTH DAILY, Disp: 90 tablet, Rfl: 0    metoprolol succinate (TOPROL-XL) 50 mg 24 hr tablet, Take 1 tablet (50 mg total) by mouth daily, Disp: 60 tablet, Rfl: 0    [START ON 10/27/2024] dofetilide (TIKOSYN) 500 mcg capsule, Take 1 capsule (500 mcg total) by mouth every 12 (twelve) hours Do not start before October 27, 2024., Disp: 60 capsule, Rfl: 2    No Known Allergies    Physical Exam:    /87   Pulse (!) 52   Ht 5' 11\" (1.803 m)   Wt 105 kg (232 lb)   BMI 32.36 kg/m²     Constitutional: normal, well developed, well nourished, alert, in no distress and non-toxic and no overt pain behavior.  Eyes: anicteric  HEENT: grossly intact  Neck: supple, symmetric, trachea midline and no masses   Pulmonary:even and unlabored  Cardiovascular:No edema or pitting edema present  Skin:Normal without rashes or lesions and well hydrated  Psychiatric:Mood and affect appropriate  Neurologic:Cranial Nerves II-XII grossly intact  Musculoskeletal:normal    Lumbar Spine Exam    Appearance:  Normal lordosis  Palpation/Tenderness:  Ttp bilateral lumbar paraspinal region  Sensory:  no sensory deficits noted  Range of Motion:  Intact flexion without pain. There is notable pain wit lumbar " extension  Motor Strength:  Left hip flexion:  5/5  Left hip extension:  5/5  Right hip flexion:  5/5  Right hip extension:  5/5  Left knee flexion:  5/5  Left knee extension:  5/5  Right knee flexion:  5/5  Right knee extension:  5/5  Left foot dorsiflexion:  5/5  Left foot plantar flexion:  5/5  Right foot dorsiflexion:  5/5  Right foot plantar flexion:  5/5  Reflexes:  Left Patellar:  2+   Right Patellar:  2+   Left Achilles:  2+   Right Achilles:  2+   Special Tests:  Left Straight Leg Test:  negative  Right Straight Leg Test:  negative  Facet loading positive bilaterally      Imaging    LUMBAR SPINE          8/13/24     INDICATION:   Lumbago with sciatica, unspecified side.      COMPARISON: 6- x-rays.     VIEWS:  XR SPINE LUMBAR 2 OR 3 VIEWS INJURY     FINDINGS:     There are 5 non rib bearing lumbar vertebral bodies.      There is no destructive osseous lesion.     Mild to moderate anterior spondylolisthesis of L4 on L5.     Multilevel degenerative facet hypertrophy and disc space narrowing in the lower lumbar spine.     Soft tissues are unremarkable.     IMPRESSION:        Mild to moderate anterior spondylolisthesis of L4 on L5.     Multilevel degenerative facet hypertrophy and disc space narrowing in the lower lumbar spine.  FL spine and pain procedure    (Results Pending)       Orders Placed This Encounter   Procedures    FL spine and pain procedure

## 2024-10-04 ENCOUNTER — CLINICAL SUPPORT (OUTPATIENT)
Dept: CARDIOLOGY CLINIC | Facility: CLINIC | Age: 54
End: 2024-10-04
Payer: COMMERCIAL

## 2024-10-04 ENCOUNTER — PATIENT MESSAGE (OUTPATIENT)
Dept: RADIOLOGY | Facility: CLINIC | Age: 54
End: 2024-10-04

## 2024-10-04 ENCOUNTER — CONSULT (OUTPATIENT)
Dept: PAIN MEDICINE | Facility: CLINIC | Age: 54
End: 2024-10-04
Payer: COMMERCIAL

## 2024-10-04 VITALS
WEIGHT: 232 LBS | HEART RATE: 52 BPM | SYSTOLIC BLOOD PRESSURE: 165 MMHG | HEIGHT: 71 IN | DIASTOLIC BLOOD PRESSURE: 87 MMHG | BODY MASS INDEX: 32.48 KG/M2

## 2024-10-04 DIAGNOSIS — Z79.899 VISIT FOR MONITORING TIKOSYN THERAPY: Primary | ICD-10-CM

## 2024-10-04 DIAGNOSIS — Z51.81 VISIT FOR MONITORING TIKOSYN THERAPY: Primary | ICD-10-CM

## 2024-10-04 DIAGNOSIS — M54.50 CHRONIC BILATERAL LOW BACK PAIN WITHOUT SCIATICA: Primary | ICD-10-CM

## 2024-10-04 DIAGNOSIS — M53.3 SI (SACROILIAC) JOINT DYSFUNCTION: ICD-10-CM

## 2024-10-04 DIAGNOSIS — G89.29 CHRONIC BILATERAL LOW BACK PAIN WITHOUT SCIATICA: Primary | ICD-10-CM

## 2024-10-04 DIAGNOSIS — M47.816 LUMBAR FACET ARTHROPATHY: ICD-10-CM

## 2024-10-04 PROCEDURE — 93000 ELECTROCARDIOGRAM COMPLETE: CPT

## 2024-10-04 PROCEDURE — 99211 OFF/OP EST MAY X REQ PHY/QHP: CPT

## 2024-10-04 PROCEDURE — 99204 OFFICE O/P NEW MOD 45 MIN: CPT | Performed by: STUDENT IN AN ORGANIZED HEALTH CARE EDUCATION/TRAINING PROGRAM

## 2024-10-04 NOTE — PATIENT COMMUNICATION
Pt is scheduled for MBB with Dr Whipple on 12/12/24    Pt is not diabetic and does not have a pacemaker or defibrillator - pt does report he has a loop recorder    Pt given instructions in office and via myc message    Have you completed PT/HEP/Chiro in the past 6 months for dedicated area? Per chart, pt did PT with [facility] in 2022 but stopped due to cost -- pt was given HEP by Dr Whipple on 10/4/24 -- pt has also tried OTC meds for pain relief, but they did not help  If yes, how long did you complete?  What was the frequency?  Did it provide relief?  If no, reason therapy was not completed?

## 2024-10-04 NOTE — PROGRESS NOTES
Pt was in the office today for a nurse visit / EKG instructed by Sergio Coffey PA-C for Tikosyn monitoring.    EKG was successfully done and reviewed by Dr. Carnes.    Pt was advised to continue his current meds and notify us of any cardiac symptoms out of the ordinary.    Pt verbalized understanding.

## 2024-10-04 NOTE — PATIENT INSTRUCTIONS
"Patient Education     Back exercises   The Basics   Written by the doctors and editors at [facility]   Why do I need to do back exercises? -- Back exercises can help ease back pain and might help prevent future back pain. Long term, it is important to strengthen the muscles in your lower back, buttocks, and belly. These are your \"core muscles.\" Stretching exercises are also important to keep your muscles flexible.  Below are some stretching and strengthening exercises that might help you. Other forms of movement can help ease or prevent back pain, too. For example, some people like to walk, do aerobic exercise, or do yoga or arun chi. The most important thing is to move your body. Your doctor, nurse, or physical therapist can help you find different types of activity that work for you.  What stretching exercises should I do? -- Below are some examples of stretching exercises. Warm up your muscles before stretching to help prevent injury. To warm up, you can walk, jog, or cycle for a few minutes.  Start by repeating each of these stretches 2 to 3 times. Try to hold each stretch for 5 to 10 seconds, and try to do the stretches 2 to 3 times each day. Breathe slowly and deeply as you do the exercises. Never bounce when doing stretches.   Cat-cow stretch (figure 1) - Start on all fours on the floor, with your hands under your shoulders, knees under your hips, and your back flat. First, tuck your chin and tighten your stomach muscles as you round your back (like a \"cat\"). Hold the stretch for about 10 seconds. Rest for a few seconds as you flatten your back. Next, lift your chin and let your belly and lower back sink toward the floor (like a \"cow\"). Hold the stretch for about 10 seconds.   Single knee-to-chest stretches (figure 2) ? While lying on your back, bend your knees with your feet flat on the floor. Pull 1 knee toward your chest until you feel a stretch in your lower back and buttock area. Lower, and repeat with the " other knee. If you have knee problems, pull your knee up by grabbing the back of your thigh instead of the front of your knee. You can also do this exercise by grabbing both knees at the same time.   Lower trunk rotations (figure 3) ? While lying on your back, bend your knees with your feet flat on the floor. Keep your knees and ankles together, and then drop them to 1 side. Keep both of your shoulders touching the floor until you feel a stretch in the muscles at the side of the back. Repeat on the other side.   Lower back stretches seated (figure 4) ? Sit in a chair with your feet spread about shoulder width apart. Then, lean forward until you feel a stretch in your lower back.  What strengthening exercises should I do? -- Below are some examples of strengthening exercises.  Start by doing each exercise 2 to 3 times. Work up to doing each exercise 10 times. Hold each exercise for 3 to 5 seconds, and try to do the exercises 2 to 3 times each day. Do all exercises slowly.   Shoulder blade squeezes (figure 5) ? Pinch your shoulder blades together on your upper back, and hold 3 to 5 seconds. You can also do these 1 side at a time. Sit with good posture, and make sure that your shoulders do not rise up when you do this exercise. Relax.   Pelvic tilts (figure 6) ? Lie on your back with your knees bent and feet flat on the floor. Tighten your stomach muscles, and press your lower back down to the floor. Relax. You should be able to breathe comfortably during this exercise.   Hip lifts (figure 7) ? Lie on your back with your knees bent and feet flat on the floor. Tighten your stomach muscles, keep your back flat, and lift your buttocks off of the floor. Relax. You should feel this in your buttocks, not in your lower back.  What else should I know?    Exercise, including stretching, might be slightly uncomfortable. But you should not have sharp or severe pain. If you do get severe pain, stop what you are doing. If severe  "pain continues, call your doctor or nurse.   Do not hold your breath when exercising. If you tend to hold your breath, try counting out loud when exercising. If any exercise bothers you, stop right away.   Always warm up before exercising. Warm muscles stretch much easier than cool muscles. Stretching cool muscles can lead to injury.   Doing exercises before a meal can be a good way to get into a routine.  All topics are updated as new evidence becomes available and our peer review process is complete.  This topic retrieved from [employer] on: Apr 03, 2024.  Topic 990720 Version 2.0  Release: 32.2.4 - C32.92  © 2024 UpToDate, Inc. and/or its affiliates. All rights reserved.  figure 1: Cat-cow stretch     Start on all fours on the floor, with hands under your shoulders, knees under your hips, and your back flat. First, tuck your chin and tighten your stomach muscles as you round your back (like a \"cat\"). Hold the stretch for about 10 seconds. Rest for a few seconds as you flatten your back. Next, lift your chin and let your belly and lower back sink toward the floor (like a \"cow\"). Hold the stretch for about 10 seconds.  Graphic [id number] Version 1.0  figure 2: Single knee-to-chest stretch     Lie on your back, bend your knees, and have your feet flat on the floor. Pull 1 knee toward your chest until you feel a stretch in your lower back and buttock area. Repeat with the other knee. If you have knee problems, pull your knee up by grabbing the back of your thigh instead of the front of your knee. You can also do this exercise by grabbing both knees at the same time.  Graphic [id number] Version 1.0  figure 3: Lower trunk rotation     While lying on your back, bend your knees and have your feet flat on the floor. Keep your legs together and then drop them to 1 side. Keep both of your shoulders touching the floor until you feel a stretch in the muscles at the side of the back. Repeat on the other side.  Graphic [id number] Version " 1.0  figure 4: Lower back stretch     Sit in a chair with your feet spread about shoulder width apart. Then, lean forward until you feel a stretch in your lower back.  Graphic 188815 Version 1.0  figure 5: Shoulder blade squeezes     Pinch your shoulder blades together on your upper back and hold for 3 to 5 seconds. Make sure that you are sitting with good posture and that your shoulders do not raise up when you do this exercise. Relax.  Graphic [id number] Version 1.0  figure 6: Pelvic tilts     Lie on your back with your knees bent and feet flat on the floor. Tighten your stomach muscles and press your lower back down to the floor. Relax.  Graphic [id number] Version 1.0  figure 7: Hip lifts     Lie on your back with your knees bent and feet flat on the floor. Tighten your stomach muscles and lift your buttocks off of the floor. Relax.  Graphic [id number] Version 1.0  Consumer Information Use and Disclaimer   Disclaimer: This generalized information is a limited summary of diagnosis, treatment, and/or medication information. It is not meant to be comprehensive and should be used as a tool to help the user understand and/or assess potential diagnostic and treatment options. It does NOT include all information about conditions, treatments, medications, side effects, or risks that may apply to a specific patient. It is not intended to be medical advice or a substitute for the medical advice, diagnosis, or treatment of a health care provider based on the health care provider's examination and assessment of a patient's specific and unique circumstances. Patients must speak with a health care provider for complete information about their health, medical questions, and treatment options, including any risks or benefits regarding use of medications. This information does not endorse any treatments or medications as safe, effective, or approved for treating a specific patient. UpToDate, Inc. and its affiliates disclaim any warranty or  liability relating to this information or the use thereof.The use of this information is governed by the Terms of Use, available at https://www.wolterskluwer.com/en/know/clinical-effectiveness-terms. 2024© UpToDate, Inc. and its affiliates and/or licensors. All rights reserved.  Copyright   © 2024 UpToDate, Inc. and/or its affiliates. All rights reserved.    Patient Education     Nerve Blocks   Why is this procedure done?   Nerve blocks can help to manage pain. By giving you a drug into an exact group of nerves, your doctor may be able to block pain to a specific part of your body. Some nerve blocks are used after surgery, especially if you have had an abdominal surgery. Nerve blocks are used before surgery to lessen the need for opioid drugs during and after surgery.  There are a few kinds of nerve blocks. Some nerve blocks are used to:  Treat pain. These may have a pain drug and a drug to help with swelling.  Find where your pain is coming from. This kind of nerve block will have a pain drug that lasts for only a certain amount of time.  See if another kind of treatment like surgery will help your pain.  Prevent pain during or after a procedure.  Help you avoid surgery.  Give relief of pain during and after surgery.  Lessen the use of opioid drugs needed for pain after surgery.  Block the pain in an area of the body during surgery as anesthesia.  What will the results be?   The nerve block may help to treat or ease your pain. The area may be numb. You may have some pain relief right away. You may be able to use fewer pain medicines after surgery. It also may be easier for you to move around after surgery. Some nerve blocks go away within a few hours. Others give you pain relief for a day or so to a few months or longer. Some nerve blocks can take a few days to work fully.  What happens before the procedure?   Your doctor will ask you about your health history. Talk to the doctor about:  All the drugs you are  taking. Be sure to include all prescription, over the counter, and herbal supplements. Tell the doctor if you have any drug allergy. Bring a list of drugs you take with you.  Any bleeding problems. Be sure to tell your doctor if you are taking any drugs that may cause bleeding. Some of these are warfarin, rivaroxaban, apixaban, ticagrelor, clopidogrel, ibuprofen, naproxen, or aspirin. Certain vitamins and herbs, such as garlic and fish oil, may also add to the risk for bleeding. You may need to stop these drugs as well. Talk to your doctor about them.  What happens during the procedure?   Sometimes, the doctor will give you a special drug to make you sleepy for the nerve block. Other times, you are completely awake. You may also have a nerve block as a part of your surgery.  The doctor will position you in a way to give them easy access to where you will be having the nerve block.  The doctor will clean the area and give you a local numbing drug. The doctor will use a long thin needle to give you the nerve block. Often, the doctor will use a special x-ray, ultrasound, or CT scan to make sure the needle is in the right place. The doctor will inject the drug close to the nerve that is causing your pain. Sometimes they inject the drug in an area that will block pain from a few nerves.  The doctor will take out the needle and place a clean bandage on your skin.  Sometimes, the doctor may leave a catheter in place to deliver medicine over 1 to 2 days. You may have to return to your doctor's office to have the catheter removed.  The procedure takes 15 to 30 minutes.  What happens after the procedure?   If you are not having surgery, you will be able to go home the same day. You may be asked to rest for 15 to 30 minutes. If the doctor gives you a special drug to make you sleepy for the procedure, you will need someone to drive you home.  What care is needed at home?   You will be allowed to shower or take a bath later that  same day unless you have a catheter still in place.  You may need other medicines to help with pain as your nerve block wears off.  What follow-up care is needed?   As your body absorbs the drugs, your pain may come back. Talk to your doctor about if you need another nerve block and how often you can have a nerve block.  What problems could happen?   Infection  Bleeding or bruising  Pain at the injection site  Raised blood sugar  Rash  Itching  Numbness  Nerve injury  Allergic reaction  Puncture or laceration of an organ like the liver, spleen. or bowel  Numbing medicine injected into a blood vessel  Last Reviewed Date   2020-04-22  Consumer Information Use and Disclaimer   This generalized information is a limited summary of diagnosis, treatment, and/or medication information. It is not meant to be comprehensive and should be used as a tool to help the user understand and/or assess potential diagnostic and treatment options. It does NOT include all information about conditions, treatments, medications, side effects, or risks that may apply to a specific patient. It is not intended to be medical advice or a substitute for the medical advice, diagnosis, or treatment of a health care provider based on the health care provider's examination and assessment of a patient’s specific and unique circumstances. Patients must speak with a health care provider for complete information about their health, medical questions, and treatment options, including any risks or benefits regarding use of medications. This information does not endorse any treatments or medications as safe, effective, or approved for treating a specific patient. UpToDate, Inc. and its affiliates disclaim any warranty or liability relating to this information or the use thereof. The use of this information is governed by the Terms of Use, available at https://www.woltersAdspace Networksuwer.com/en/know/clinical-effectiveness-terms   Copyright   Copyright © 2024 [employer] and its affiliates and/or licensors. All rights reserved.

## 2024-10-04 NOTE — Clinical Note
Pt was in the office today for an EKG which was successfully done and scanned into pt's chart for review. Thanks, Karen

## 2024-10-07 ENCOUNTER — OFFICE VISIT (OUTPATIENT)
Dept: PHYSICAL THERAPY | Facility: CLINIC | Age: 54
End: 2024-10-07
Payer: COMMERCIAL

## 2024-10-07 ENCOUNTER — PATIENT OUTREACH (OUTPATIENT)
Dept: CASE MANAGEMENT | Facility: OTHER | Age: 54
End: 2024-10-07

## 2024-10-07 DIAGNOSIS — M19.011 PRIMARY OSTEOARTHRITIS OF RIGHT SHOULDER: ICD-10-CM

## 2024-10-07 DIAGNOSIS — M25.511 RIGHT SHOULDER PAIN, UNSPECIFIED CHRONICITY: Primary | ICD-10-CM

## 2024-10-07 PROCEDURE — 97110 THERAPEUTIC EXERCISES: CPT | Performed by: PHYSICAL THERAPIST

## 2024-10-07 PROCEDURE — 97140 MANUAL THERAPY 1/> REGIONS: CPT | Performed by: PHYSICAL THERAPIST

## 2024-10-07 NOTE — PROGRESS NOTES
PT Re-Evaluation     Today's date: 10/7/2024  Patient name: Antonio Mckeon  : 1970  MRN: [id number]  Referring provider: Roland Flores DO  Dx:   Encounter Diagnosis     ICD-10-CM    1. Right shoulder pain, unspecified chronicity  M25.511       2. Primary osteoarthritis of right shoulder  M19.011                      Assessment  Impairments: abnormal or restricted ROM, activity intolerance, impaired physical strength, lacks appropriate home exercise program, pain with function, scapular dyskinesis, poor posture , unable to perform ADL, participation limitations, activity limitations and endurance  Symptom irritability: high    Assessment details: Patient is a pleasant 53 y.o. male who presents to physical therapy with main reports of chronic R shoulder pain.    No further referral appears necessary at this time based upon examination results.    Primary movement impairment diagnosis of GH hypomobility and parascapular weakness resulting in pathoanatomical symptoms of decreased ROM, decreased strength, and increased symptom irritability. This limits Antonio's ability to reach overhead and lift objects overhead w/o significant pain.     Prognosis is good given HEP compliance and PT 2 times per week over the next 12 weeks.  Positive prognostic indicators include positive attitude toward recovery.  Negative prognostic indicators include chronic pain and upcoming surgery for Afib.    Please contact me if you have any questions.  Thank you for the opportunity to share in Antonio Mckeon care.       Prognosis: good    Goals  Short term:  Pt will be independent w/ individualized HEP  Pt will have a decrease in symptom irritability by 2 points     Long term:  Pt will be able to reach fully overhead and touch top of doorway w/ minimal to no symptom irritability.   Pt will be able to lift 10 pounds overhead w/ minimal to no symptom irritability.   Pt will be able to throw a football and baseball w/ minimal to no  symptom irritability.   Pt will improve FOTO by [facility]      Plan  Patient would benefit from: skilled physical therapy  Referral necessary: No    Planned therapy interventions: abdominal trunk stabilization, IASTM, joint mobilization, manual therapy, massage, nerve gliding, neuromuscular re-education, patient/caregiver education, postural training, stretching, therapeutic activities, strengthening, therapeutic training, therapeutic exercise, home exercise program, activity modification, body mechanics training, functional ROM exercises and flexibility    Frequency: 2x week  Plan of Care beginning date: 9/10/2024  Plan of Care expiration date: 12/3/2024  Treatment plan discussed with: patient      Subjective Evaluation    History of Present Illness  Mechanism of injury: Pt reports to outpatient PT following onset of chronic R shoulder pain that started after being unable to perform activity for three months due to illness in .   R shoulder pain for over a year, pain surrounds the entire shoulder   Limited in overhead activity and lifting, pain only during activity   Pt just received a Cortizone shot a few weeks ago and it made no noticeable difference   Pt reports that he occasionally gets a sharp pain in anterior shoulder.   Pt reports no radicular symptoms   No red flags, history of congestive heart failure-  Afib surgery     (10/7): Continuing HEP. Reports feeling much better from Afib surgery. No chest pain or dizziness since. He reports some improvement in shoulder motion since beginning.           Recurrent probem    Quality of life: fair    Patient Goals  Patient goals for therapy: decreased pain, increased motion, increased strength, independence with ADLs/IADLs and return to sport/leisure activities  Patient goal: Return to throwing a ball  Pain  Current pain ratin  At best pain ratin  At worst pain ratin  Quality: tight, throbbing, pressure and discomfort  Relieving factors:  relaxation and rest  Aggravating factors: lifting and overhead activity  Progression: no change    Social Support    Employment status: working (construction)  Hand dominance: right    Treatments  Previous treatment: injection treatment      Objective     Postural Observations  Seated posture: poor  Standing posture: fair      Neurological Testing     Sensation   Cervical/Thoracic   Left   Intact: light touch    Right   Intact: light touch    Active Range of Motion   Cervical/Thoracic Spine       Cervical  Subcranial protraction:  WFL   Subcranial retraction:  WFL   Flexion:  WFL  Extension:  with pain Restriction level: maximal  Left lateral flexion:  WFL  Right lateral flexion:  with pain Restriction level moderate  Left rotation:  WFL  Right rotation:  with pain Restriction level: moderate    Thoracic    Flexion:  WFL  Extension:  Restriction level: minimal  Left lateral flexion:  WFL  Right lateral flexion:  WFL  Left rotation:  WFL  Right rotation:  WFL  Left Shoulder   Normal active range of motion    Right Shoulder   Flexion: 123 degrees with pain  Extension: WFL  Abduction: 80 degrees with pain  External rotation 0°: WFL  External rotation 45°: WFL  External rotation 90°: with pain  Internal rotation 0°: WFL  Internal rotation 45°: WFL  Internal rotation 90°: with pain    Passive Range of Motion   Left Shoulder   Normal passive range of motion    Right Shoulder   Flexion: 140 (pain guarded) degrees with pain  Abduction: 80 degrees with pain  External rotation 0°: 65 degrees   Internal rotation 0°: 50 degrees     Scapular Mobility     Right Shoulder   Scapular mobility: fair    Joint Play     Right Shoulder  Hypomobile in the anterior capsule, posterior capsule, inferior capsule and thoracic spine.     Hypomobile: C5, C6, C7, T1, T2, T3 and T4     Pain: C5, C6, C7, T1, T2 and T3     Strength/Myotome Testing     Left Shoulder     Planes of Motion   Flexion: 4+   Extension: 4+   Abduction: 4+   External  "rotation at 0°: 4+   Internal rotation at 0°: 4+     Isolated Muscles   Lower trapezius: 4   Middle trapezius: 4   Serratus anterior: 4   Upper trapezius: 5     Right Shoulder     Planes of Motion   Flexion: 4-   Extension: 3+   Abduction: 4-   External rotation at 0°: 4   Internal rotation at 0°: 4+     Isolated Muscles   Lower trapezius: 3-   Middle trapezius: 3-   Serratus anterior: 3+   Upper trapezius: 5     Left Elbow   Flexion: 5  Extension: 5    Right Elbow   Flexion: 5  Extension: 5    Left Wrist/Hand   Wrist extension: 5  Wrist flexion: 5  Radial deviation: 5  Ulnar deviation: 5  Thumb extension: 5    Right Wrist/Hand   Wrist extension: 5  Wrist flexion: 5  Radial deviation: 5  Ulnar deviation: 5  Thumb extension: 5    Tests     Right Shoulder   Positive belly press, empty can, Hawkin's, lift-off, Neer's and painful arc.   Negative drop arm, external rotation lag sign and internal rotation lag sign.              Precautions: Afib, Future surgery cardiology     POC expires Unit limit Auth Expiration date PT/OT/ST + Visit Limit?   12/3/24 BOMN PEND BOMN                           Visit/Unit Tracking  AUTH Status:  Date 9/10              Yes- 24 Used 1               Remaining                  HEP:  [id number]      Manuals 9/10 9/16 9/18 10/7         GH Mob G3-4  JMK           R shoulder PROM  JMK AF          CPA/UPA    R UPA C5 gd 3-4         Inf GH glide    Thao gd 2-3         Post GH glide    THAO gd 2-3         Neuro Re-Ed             [address]  2x12 35# 2x12 35#          [place] extension  2x12 20# 2x12 20#          Prone Y  2x10 3\" 2x10 3\"                                                              Ther Ex             HEP education 8'            UE bike  3'/3' 3'/3' 3'/3'         Thoracic extensions  1/2 FR x20 1/2 FR x20          Supine cane flx   5\"x20          pulleys   3'          Cervical SNAG    THAO HEP                                   Ther Activity                                       Gait Training     "                                   Modalities

## 2024-10-08 NOTE — PROGRESS NOTES
Antonio called me back and explained he is driving and sometimes has bad reception.  He is feeling well.He was seen by his cardiologist and was in sinus rhythm.  He is asymptomatic.  He is going to outpatient physical therapy for right shoulder pain.  He has no needs at this time.  He has an appointment tomorrow with his PCP.

## 2024-10-09 ENCOUNTER — OFFICE VISIT (OUTPATIENT)
Dept: PHYSICAL THERAPY | Facility: CLINIC | Age: 54
End: 2024-10-09
Payer: COMMERCIAL

## 2024-10-09 DIAGNOSIS — M25.511 RIGHT SHOULDER PAIN, UNSPECIFIED CHRONICITY: Primary | ICD-10-CM

## 2024-10-09 DIAGNOSIS — M19.011 PRIMARY OSTEOARTHRITIS OF RIGHT SHOULDER: ICD-10-CM

## 2024-10-09 PROCEDURE — 97140 MANUAL THERAPY 1/> REGIONS: CPT

## 2024-10-09 PROCEDURE — 97110 THERAPEUTIC EXERCISES: CPT

## 2024-10-09 NOTE — PROGRESS NOTES
"Daily Note     Today's date: 10/9/2024  Patient name: Antonio Mckeon  : 1970  MRN: [id number]  Referring provider: Roland Flores DO  Dx:   Encounter Diagnosis     ICD-10-CM    1. Right shoulder pain, unspecified chronicity  M25.511       2. Primary osteoarthritis of right shoulder  M19.011           Start Time: 0804  Stop Time: 0845  Total time in clinic (min): 41 minutes    Subjective: Pt reports that he has had no trouble following surgery and that he has been completing his HEP w/o any issues.       Objective: See treatment diary below      Assessment: Tolerated treatment well. Pt's shoulder flexion PROM is now WFL at 170 degrees, same measure actively w/ minimal end range pain. Cervical Snags caused Pt's neck pain to increase and caused headache, DC exercise going forward. Pt limited to 90 degrees abduction w/ heavy symptom irritability. Patient demonstrated fatigue post treatment, exhibited good technique with therapeutic exercises, and would benefit from continued PT for decreased symptom irritability, increased ROM, and increased strength.       Plan: Continue per plan of care.  Progress treatment as tolerated.       Precautions: Afib, Future surgery cardiology     POC expires Unit limit Auth Expiration date PT/OT/ST + Visit Limit?   12/3/24 BOMN PEND BOMN                           Visit/Unit Tracking  AUTH Status:  Date 9/10 10/9             Yes- 24 Used 1               Remaining   19               HEP:  [id number]     Manuals 9/10 9/16 9/18 10/7 10/9        GH Mob G3-4  JMK   JMK        R shoulder PROM  JMK AF  JMK        CPA/UPA    R UPA C5 gd 3-4         Inf GH glide    Thao gd 2-3 JMK G2-3        Post GH glide    THAO gd 2-3 JMK G2-3        Neuro Re-Ed             [address]  2x12 35# 2x12 35#  3x10 35#        [place] extension  2x12 20# 2x12 20#  3x10 25#        Prone Y  2x10 3\" 2x10 3\"  2x10 3\"                                                            Ther Ex             HEP education 8'         " "   UE bike  3'/3' 3'/3' 3'/3' 3'/3'        Thoracic extensions  1/2 FR x20 1/2 FR x20  1/2 FR x30        Supine cane flx   5\"x20          pulleys   3'  4'        Cervical SNAG    THAO HEP X15 ea 3\"                                  Ther Activity                                       Gait Training                                       Modalities                                            "

## 2024-10-10 ENCOUNTER — IN-CLINIC DEVICE VISIT (OUTPATIENT)
Dept: CARDIOLOGY CLINIC | Facility: CLINIC | Age: 54
End: 2024-10-10
Payer: COMMERCIAL

## 2024-10-10 DIAGNOSIS — Z95.818 PRESENCE OF OTHER CARDIAC IMPLANTS AND GRAFTS: Primary | ICD-10-CM

## 2024-10-10 PROCEDURE — 93285 PRGRMG DEV EVAL SCRMS IP: CPT | Performed by: STUDENT IN AN ORGANIZED HEALTH CARE EDUCATION/TRAINING PROGRAM

## 2024-10-10 NOTE — PROGRESS NOTES
MDT LNQ22/ACTIVE SYSTEM IS MRI CONDITIONAL   DEVICE INTERROGATED IN THE [place] OFFICE:  BATTERY VOLTAGE ADEQUATE.  1 PAUSE EPISODE, EGM SHOWS UNDERSENSING.  NO PATIENT ACTIVATED EPISODES.  NO PROGRAMMING CHANGES MADE TO DEVICE PARAMETERS.  WOUND CHECK: INCISION CLEAN AND DRY WITH EDGES APPROXIMATED (SEE MEDIA); SUTURES REMOVED; WOUND CARE AND RESTRICTIONS REVIEWED WITH PATIENT.  NORMAL DEVICE FUNCTION.  RG

## 2024-10-14 ENCOUNTER — APPOINTMENT (OUTPATIENT)
Dept: PHYSICAL THERAPY | Facility: CLINIC | Age: 54
End: 2024-10-14
Payer: COMMERCIAL

## 2024-10-16 ENCOUNTER — PATIENT OUTREACH (OUTPATIENT)
Dept: CASE MANAGEMENT | Facility: OTHER | Age: 54
End: 2024-10-16

## 2024-10-16 ENCOUNTER — APPOINTMENT (OUTPATIENT)
Dept: PHYSICAL THERAPY | Facility: CLINIC | Age: 54
End: 2024-10-16
Payer: COMMERCIAL

## 2024-10-17 NOTE — PROGRESS NOTES
I spoke with Antonio who feels he is back to his baseline and remains asymptomatic.  He is taking his medications as directed.  He has seen cardiology for follow up and per note is in sinus rhythm.  Will follow up in 2 weeks.

## 2024-10-18 ENCOUNTER — TELEPHONE (OUTPATIENT)
Age: 54
End: 2024-10-18

## 2024-10-18 NOTE — TELEPHONE ENCOUNTER
"Patient called the RX Refill Line. Message is being forwarded to the office.     Patient is requesting refill of the medication that was prescribed to him at the hospital after he had an ablation, says he have about 8 days left but he would like to make sure he will have the medication before he runs out.      Patient does not know the name of the medication and says the hospital told him it was not not available in local pharmacy so they sent it to a pharmacy in [place] and it starts with \"T\"    Please verify medication (Tikosyn chart shows a future order to [facility] [place] with 2 refills) and contact patient to give him an update and to clarify any further questions.    Please contact patient at [phone]    "

## 2024-10-21 ENCOUNTER — OFFICE VISIT (OUTPATIENT)
Dept: PHYSICAL THERAPY | Facility: CLINIC | Age: 54
End: 2024-10-21
Payer: COMMERCIAL

## 2024-10-21 DIAGNOSIS — M19.011 PRIMARY OSTEOARTHRITIS OF RIGHT SHOULDER: ICD-10-CM

## 2024-10-21 DIAGNOSIS — M25.511 RIGHT SHOULDER PAIN, UNSPECIFIED CHRONICITY: Primary | ICD-10-CM

## 2024-10-21 PROCEDURE — 97110 THERAPEUTIC EXERCISES: CPT

## 2024-10-21 PROCEDURE — 97140 MANUAL THERAPY 1/> REGIONS: CPT

## 2024-10-21 PROCEDURE — 97112 NEUROMUSCULAR REEDUCATION: CPT

## 2024-10-21 NOTE — PROGRESS NOTES
Daily Note     Today's date: 10/21/2024  Patient name: Antonio Mckeon  : 1970  MRN: [id number]  Referring provider: Roland Flores DO  Dx:   Encounter Diagnosis     ICD-10-CM    1. Right shoulder pain, unspecified chronicity  M25.511       2. Primary osteoarthritis of right shoulder  M19.011         Start Time: 0805  Stop Time: 0843  Total time in clinic (min): 38 minutes    Subjective: Pt reports that he has been utilizing his RUE more often and due to the increase in activity his pain levels have been higher over the last few days.       Objective: See treatment diary below      Assessment: Tolerated treatment well. No changes in PROM today, Pt able to actively abduct RUE higher than previous visit by utilizing compensation patterns such as left trunk lean and shoulder hike. Added thoracic circuit to program to further improve active motion and thoracic mobility. Added rotator cuff strengthening today, Pt required minor cueing and reports minimal symptom irritability. Patient demonstrated fatigue post treatment, exhibited good technique with therapeutic exercises, and would benefit from continued PT for decreased symptom irritability, increased ROM, and increased strength.       Plan: Continue per plan of care.  Progress treatment as tolerated.       Precautions: Afib, Future surgery cardiology     POC expires Unit limit Auth Expiration date PT/OT/ST + Visit Limit?   12/3/24 BOMN PEND BOMN                           Visit/Unit Tracking  AUTH Status:  Date 9/10 10/9 10/21            Yes- 24 Used 1  6                           HEP:  [id number]     Manuals 9/10 9/16 9/18 10/7 10/9 10/21       GH Mob G3-4  JMK   JMK        R shoulder PROM  JMK AF  JMK JMK       CPA/UPA    R UPA C5 gd 3-4         Inf GH glide    Thao gd 2-3 JMK G2-3 JMK G3-4       Post GH glide    THAO gd 2-3 JMK G2-3 JMK G3-4       Neuro Re-Ed             [place] [address]  2x12 35# 2x12 35#  3x10 35# 3x12 35#       Lloyd extension   "2x12 20# 2x12 20#  3x10 25# 3x12 22#       Prone Y  2x10 3\" 2x10 3\"  2x10 3\" 3x10 3\"       TB IR/ER      3x10 ea RTB                                              Ther Ex             HEP education 8'            UE bike  3'/3' 3'/3' 3'/3' 3'/3' 3'/3'       Thoracic extensions  1/2 FR x20 1/2 FR x20  1/2 FR x30        Supine cane flx   5\"x20          pulleys   3'  4'        Cervical SNAG    THAO HEP X15 ea 3\" DC       Thoracic circuit      5'                    Ther Activity                                       Gait Training                                       Modalities                                            "

## 2024-10-22 NOTE — ASSESSMENT & PLAN NOTE
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

## 2024-10-22 NOTE — ASSESSMENT & PLAN NOTE
New onset June 2024 admitted for RVR found to have EF 40%  S/p DCCV 5/31  Started on metoprolol, and lisinopril for GDMT currently on metoprolol 100 mg twice daily for rate control  Eliquis 5 mg twice daily for C2 V score 2  Mild biatrial dilation on echo 5/31  S/p PFA PVI, PW a fib ablation and loop implant   AAD: tikosyn 500 mcg every 12 hours  Rate control Toprol-XL decreased to 50mg due to bradycardia   EKG here sinus rhythm  ms   Loop without a fib   Doing well continue current meds   Follow up in 6 months

## 2024-10-22 NOTE — PROGRESS NOTES
Assessment & Plan  Paroxysmal atrial fibrillation (HCC)  New onset June 2024 admitted for RVR found to have EF 40%  S/p DCCV 5/31  Started on metoprolol, and lisinopril for GDMT currently on metoprolol 100 mg twice daily for rate control  Eliquis 5 mg twice daily for C2 V score 2  Mild biatrial dilation on echo 5/31  S/p PFA PVI, PW a fib ablation and loop implant   AAD: tikosyn 500 mcg every 12 hours  Rate control Toprol-XL decreased to 50mg due to bradycardia   EKG here sinus rhythm  ms   Loop without a fib   Doing well continue current meds   Follow up in 6 months   Heart failure with reduced ejection fraction (HCC)  Wt Readings from Last 3 Encounters:   10/04/24 105 kg (232 lb)   09/25/24 105 kg (232 lb)   09/25/24 105 kg (232 lb)   Noted new onset in May 2024 after presenting in new onset rapid atrial fibrillation  Echocardiogram at that time with EF 40% with grade 2 diastolic dysfunction with mild biatrial dilation  GDMT with metoprolol, and lisinopril  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  Cardiac catheterization 6/3/2024 without coronary artery disease  Repeat echo 8/24 EF 50%   Patient concern about EF still being low   Will order limited echo for 2 months from now (3 months post ablation) to assess EF     Class 1 obesity due to excess calories without serious comorbidity with body mass index (BMI) of 32.0 to 32.9 in adult  Diet and lifestyle changes addressed  Discussed staying under 2K calories   Implantable loop recorder present  S/p MDT loop implant at time of ablation       Rhythm History:   Atrial fibrillation:     Atrial flutter:     SVT:     VT/VF/PVC:     Device history:   Pacemaker:    Defibrillator:    BIV PPM:    BIV ICD:    ILR:      Cardiac Testing:     ECHO: No results found for this or any previous visit.        History of Present Illness     HPI/INTERVAL HISTORY:  Antonio Mckeon is a 53 y.o. year old male with past medical history as mentioned above.  Patient presents to [facility] today to undergo elective atrial fibrillation ablation as well as loop recorder implant and medical admission with dofetilide loading.  Briefly, patient's history starts in May 2024 when he was first diagnosed with atrial fibrillation after he was found to be in RVR.  He was admitted at that time and started on blood thinner with Eliquis 5 mg twice daily for [id number] score of 2.  He underwent SPENCER and cardioversion on 5/31/2024.  He underwent echocardiogram at that time showing newly reduced EF to 40% with mild biatrial dilation.  Subsequent cardiac catheterization 2 months later did not reveal any obstructive coronary artery disease.  Patient was started on GDMT with metoprolol, and lisinopril.  He was seen by electrophysiology in the outpatient setting and it was felt that this could be related to tachycardia related cardiomyopathy thus patient was recommended for atrial fibrillation ablation as well as antiarrhythmic start with loop recorder implantation for further monitoring.    Since ablation, patient has felt great.  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  He has not noticed any significant fatigue that he felt before while he was in atrial fibrillation.  He is concerned that his echo cardiogram might show us show persistently reduced ejection fraction would like repeat echo to be performed.  Discussed that we can do that, however would like to do it in 2 months to allow longer period of sinus rhythm.         Review of Systems   Constitutional:  Negative for diaphoresis and fatigue.   Respiratory:  Negative for chest tightness and shortness of breath.    Cardiovascular:  Negative for chest pain, palpitations and leg swelling.   Neurological:  Negative for dizziness and light-headedness.   All other systems reviewed and are negative.    ROS as noted above, otherwise 12 point review of systems was performed and is negative.       Historical Information   Social  History     Socioeconomic History    Marital status: Single     Spouse name: Not on file    Number of children: Not on file    Years of education: Not on file    Highest education level: Not on file   Occupational History    Not on file   Tobacco Use    Smoking status: Never    Smokeless tobacco: Never   Vaping Use    Vaping status: Never Used   Substance and Sexual Activity    Alcohol use: Yes     Comment: sobor x1 mo 5/2022    Drug use: No    Sexual activity: Yes     Partners: Female   Other Topics Concern    Not on file   Social History Narrative    Not on file     Social Determinants of Health     Financial Resource Strain: Not on file   Food Insecurity: No Food Insecurity (9/26/2024)    Nursing - Inadequate Food Risk Classification     Worried About Running Out of Food in the Last Year: Never true     Ran Out of Food in the Last Year: Never true     Ran Out of Food in the Last Year: Not on file   Transportation Needs: No Transportation Needs (9/26/2024)    PRAPARE - Transportation     Lack of Transportation (Medical): No     Lack of Transportation (Non-Medical): No   Physical Activity: Not on file   Stress: Not on file   Social Connections: Not on file   Intimate Partner Violence: Not on file   Housing Stability: Low Risk  (9/26/2024)    Housing Stability Vital Sign     Unable to Pay for Housing in the Last Year: No     Number of Times Moved in the Last Year: 0     Homeless in the Last Year: No     Past Medical History:   Diagnosis Date    Heart disease     Hypertension      Past Surgical History:   Procedure Laterality Date    CARDIAC CATHETERIZATION N/A 2010    CARDIAC CATHETERIZATION Left 6/3/2024    Procedure: Cardiac catheterization;  Surgeon: Rosalinda Novoa MD;  Location: [facility];  Service: Cardiology    CARDIAC CATHETERIZATION N/A 6/3/2024    Procedure: Cardiac Coronary Angiogram;  Surgeon: Rosalinda Novoa MD;  Location: [facility];  Service: Cardiology    CARDIAC  ELECTROPHYSIOLOGY PROCEDURE N/A 9/25/2024    Procedure: Cardiac eps/afib ablation PFA;  Surgeon: Cayetano Stephens MD;  Location: [facility];  Service: Cardiology    CARDIAC ELECTROPHYSIOLOGY PROCEDURE N/A 9/25/2024    Procedure: Cardiac loop recorder implant;  Surgeon: Cayetano Stephens MD;  Location: [facility];  Service: Cardiology    KNEE ARTHROSCOPY W/ MENISCAL REPAIR Bilateral     ROTATOR CUFF REPAIR Left      Social History     Substance and Sexual Activity   Alcohol Use Yes    Comment: sobor x1 mo 5/2022     Social History     Substance and Sexual Activity   Drug Use No     Social History     Tobacco Use   Smoking Status Never   Smokeless Tobacco Never     Family History   Problem Relation Age of Onset    Diabetes Mother     Heart disease Mother     Rheum arthritis Mother     Alcohol abuse Father     Stomach cancer Father     Breast cancer Maternal Grandmother        Meds/Allergies       Current Outpatient Medications:     dofetilide (TIKOSYN) 500 mcg capsule, Take 1 capsule (500 mcg total) by mouth 2 (two) times a day, Disp: 60 capsule, Rfl: 0    [START ON 10/27/2024] dofetilide (TIKOSYN) 500 mcg capsule, Take 1 capsule (500 mcg total) by mouth every 12 (twelve) hours Do not start before October 27, 2024. (Patient not taking: Reported on 10/4/2024 Do not start before October 27, 2024.), Disp: 60 capsule, Rfl: 2    Eliquis 5 MG, TAKE 1 TABLET(5 MG) BY MOUTH TWICE DAILY, Disp: 60 tablet, Rfl: 11    lisinopril (ZESTRIL) 10 mg tablet, TAKE 1 TABLET(10 MG) BY MOUTH DAILY, Disp: 90 tablet, Rfl: 0    metoprolol succinate (TOPROL-XL) 50 mg 24 hr tablet, Take 1 tablet (50 mg total) by mouth daily, Disp: 60 tablet, Rfl: 0    No Known Allergies    Objective   Vitals: There were no vitals taken for this visit.        Physical Exam  Vitals and nursing note reviewed.   Constitutional:       General: He is not in acute distress.  HENT:      Head: Normocephalic and atraumatic.   Cardiovascular:      Rate and  Rhythm: Regular rhythm. Bradycardia present.   Pulmonary:      Effort: Pulmonary effort is normal.      Breath sounds: Normal breath sounds.   Musculoskeletal:      Right lower leg: No edema.      Left lower leg: No edema.   Skin:     General: Skin is warm and dry.   Neurological:      Mental Status: He is alert.           Labs:  Admission on 09/25/2024, Discharged on 09/27/2024   Component Date Value    Sodium 09/25/2024 141     Potassium 09/25/2024 4.4     Chloride 09/25/2024 108     CO2 09/25/2024 28     ANION GAP 09/25/2024 5     BUN 09/25/2024 12     Creatinine 09/25/2024 0.95     Glucose 09/25/2024 91     Glucose, Fasting 09/25/2024 91     Calcium 09/25/2024 9.0     eGFR 09/25/2024 89     WBC 09/25/2024 6.89     RBC 09/25/2024 4.81     Hemoglobin 09/25/2024 14.0     Hematocrit 09/25/2024 41.5     MCV 09/25/2024 86     MCH 09/25/2024 29.1     MCHC 09/25/2024 33.7     RDW 09/25/2024 15.9 (H)     Platelets 09/25/2024 233     MPV 09/25/2024 9.6     Protime 09/25/2024 15.0     INR 09/25/2024 1.15     Activated Clotting Time,* 09/25/2024 347 (H)     Specimen Type 09/25/2024 VENOUS     Activated Clotting Time,* 09/25/2024 307 (H)     Specimen Type 09/25/2024 VENOUS     Activated Clotting Time,* 09/25/2024 348 (H)     Specimen Type 09/25/2024 VENOUS     Activated Clotting Time,* 09/25/2024 294 (H)     Specimen Type 09/25/2024 VENOUS     Activated Clotting Time,* 09/25/2024 321 (H)     Specimen Type 09/25/2024 VENOUS     Activated Clotting Time,* 09/25/2024 341 (H)     Specimen Type 09/25/2024 VENOUS     WBC 09/26/2024 10.47 (H)     RBC 09/26/2024 4.12     Hemoglobin 09/26/2024 12.0     Hematocrit 09/26/2024 36.8     MCV 09/26/2024 89     MCH 09/26/2024 29.1     MCHC 09/26/2024 32.6     RDW 09/26/2024 16.7 (H)     Platelets 09/26/2024 240     MPV 09/26/2024 10.6     Sodium 09/26/2024 141     Potassium 09/26/2024 3.9     Chloride 09/26/2024 107     CO2 09/26/2024 27     ANION GAP 09/26/2024 7     BUN 09/26/2024 12      Creatinine 09/26/2024 0.93     Glucose 09/26/2024 119     Calcium 09/26/2024 8.5     eGFR 09/26/2024 92     Ventricular Rate 09/25/2024 74     Atrial Rate 09/25/2024 104     QRSD Interval 09/25/2024 102     QT Interval 09/25/2024 366     QTC Interval 09/25/2024 406     QRS Axis 09/25/2024 -27     T Wave [place] 09/25/2024 21     Ventricular Rate 09/25/2024 71     Atrial Rate 09/25/2024 71     NH Interval 09/25/2024 156     QRSD Interval 09/25/2024 112     QT Interval 09/25/2024 414     QTC Interval 09/25/2024 449     P Axis 09/25/2024 60     QRS Axis 09/25/2024 -27     T Wave [place] 09/25/2024 53     Ventricular Rate 09/25/2024 86     Atrial Rate 09/25/2024 86     NH Interval 09/25/2024 158     QRSD Interval 09/25/2024 112     QT Interval 09/25/2024 390     QTC Interval 09/25/2024 466     P Axis 09/25/2024 64     QRS [place] 09/25/2024 -26     T Wave [place] 09/25/2024 49     Ventricular Rate 09/26/2024 65     Atrial Rate 09/26/2024 65     NH Interval 09/26/2024 160     QRSD Interval 09/26/2024 116     QT Interval 09/26/2024 452     QTC Interval 09/26/2024 470     P Axis 09/26/2024 61     QRS Axis 09/26/2024 -15     T Wave [place] 09/26/2024 33     Ventricular Rate 09/25/2024 68     Atrial Rate 09/25/2024 68     NH Interval 09/25/2024 146     QRSD Interval 09/25/2024 110     QT Interval 09/25/2024 422     QTC Interval 09/25/2024 448     P Axis 09/25/2024 73     QRS Axis 09/25/2024 -2     T Wave [place] 09/25/2024 72     Sodium 09/27/2024 140     Potassium 09/27/2024 3.9     Chloride 09/27/2024 107     CO2 09/27/2024 28     ANION GAP 09/27/2024 5     BUN 09/27/2024 10     Creatinine 09/27/2024 0.91     Glucose 09/27/2024 99     Calcium 09/27/2024 8.8     eGFR 09/27/2024 94     WBC 09/27/2024 7.58     RBC 09/27/2024 3.96     Hemoglobin 09/27/2024 11.5 (L)     Hematocrit 09/27/2024 35.8 (L)     MCV 09/27/2024 90     MCH 09/27/2024 29.0     MCHC 09/27/2024 32.1     RDW 09/27/2024 16.8 (H)     Platelets 09/27/2024 199     MPV 09/27/2024  10.0     Ventricular Rate 09/26/2024 65     Atrial Rate 09/26/2024 65     MD Interval 09/26/2024 154     QRSD Interval 09/26/2024 110     QT Interval 09/26/2024 416     QTC Interval 09/26/2024 432     P Axis 09/26/2024 58     QRS [place] 09/26/2024 -16     T Wave [place] 09/26/2024 30     Ventricular Rate 09/27/2024 56     Atrial Rate 09/27/2024 56     MD Interval 09/27/2024 146     QRSD Interval 09/27/2024 114     QT Interval 09/27/2024 460     QTC Interval 09/27/2024 443     P Axis 09/27/2024 65     QRS Axis 09/27/2024 -18     T Wave [place] 09/27/2024 37    Hospital Outpatient Visit on 09/25/2024   Component Date Value    LV EF 09/25/2024 50    Appointment on 09/20/2024   Component Date Value    Sodium 09/20/2024 141     Potassium 09/20/2024 4.4     Chloride 09/20/2024 106     CO2 09/20/2024 29     ANION GAP 09/20/2024 6     BUN 09/20/2024 15     Creatinine 09/20/2024 1.02     Glucose 09/20/2024 100     Calcium 09/20/2024 9.2     AST 09/20/2024 18     ALT 09/20/2024 18     Alkaline Phosphatase 09/20/2024 72     Total Protein 09/20/2024 6.9     Albumin 09/20/2024 4.0     Total Bilirubin 09/20/2024 0.97     eGFR 09/20/2024 82     WBC 09/20/2024 7.45     RBC 09/20/2024 5.18     Hemoglobin 09/20/2024 14.6     Hematocrit 09/20/2024 44.7     MCV 09/20/2024 86     MCH 09/20/2024 28.2     MCHC 09/20/2024 32.7     RDW 09/20/2024 16.1 (H)     MPV 09/20/2024 10.1     Platelets 09/20/2024 263     nRBC 09/20/2024 0     Segmented % 09/20/2024 62     Immature Grans % 09/20/2024 0     Lymphocytes % 09/20/2024 24     Monocytes % 09/20/2024 9     Eosinophils Relative 09/20/2024 4     Basophils Relative 09/20/2024 1     Absolute Neutrophils 09/20/2024 4.61     Absolute Immature Grans 09/20/2024 0.02     Absolute Lymphocytes 09/20/2024 1.81     Absolute Monocytes 09/20/2024 0.65     Eosinophils Absolute 09/20/2024 0.31     Basophils Absolute 09/20/2024 0.05    Hospital Outpatient Visit on 08/30/2024   Component Date Value    BSA 08/30/2024  2.26     A4C EF 08/30/2024 53     LVIDd 08/30/2024 5.80     LVIDS 08/30/2024 4.30     IVSd 08/30/2024 1.20     LVPWd 08/30/2024 1.00     FS 08/30/2024 26     LA Volume Index (BP) 08/30/2024 36.7     RVID d 08/30/2024 3.9     Tricuspid annular plane * 08/30/2024 2.20     LA size 08/30/2024 4.4     LA length (A2C) 08/30/2024 5.60     LA volume (BP) 08/30/2024 83     RAA A4C 08/30/2024 19.3     Ao root 08/30/2024 3.60     Left ventricular stroke * 08/30/2024 82.00     IVS 08/30/2024 1.2     LEFT VENTRICLE SYSTOLIC * 08/30/2024 81     LV DIASTOLIC VOLUME (MOD* 08/30/2024 164     Left Atrium Area-systoli* 08/30/2024 25.1     Left Atrium Area-systoli* 08/30/2024 24.8     LVSV, 2D 08/30/2024 82     LV EF 08/30/2024 50        Imaging: I have personally reviewed pertinent reports.

## 2024-10-23 ENCOUNTER — OFFICE VISIT (OUTPATIENT)
Dept: PHYSICAL THERAPY | Facility: CLINIC | Age: 54
End: 2024-10-23
Payer: COMMERCIAL

## 2024-10-23 ENCOUNTER — OFFICE VISIT (OUTPATIENT)
Dept: CARDIOLOGY CLINIC | Facility: CLINIC | Age: 54
End: 2024-10-23
Payer: COMMERCIAL

## 2024-10-23 VITALS
BODY MASS INDEX: 32.86 KG/M2 | HEART RATE: 52 BPM | DIASTOLIC BLOOD PRESSURE: 82 MMHG | HEIGHT: 71 IN | WEIGHT: 234.7 LBS | SYSTOLIC BLOOD PRESSURE: 166 MMHG

## 2024-10-23 DIAGNOSIS — E66.09 CLASS 1 OBESITY DUE TO EXCESS CALORIES WITHOUT SERIOUS COMORBIDITY WITH BODY MASS INDEX (BMI) OF 32.0 TO 32.9 IN ADULT: ICD-10-CM

## 2024-10-23 DIAGNOSIS — Z95.818 IMPLANTABLE LOOP RECORDER PRESENT: ICD-10-CM

## 2024-10-23 DIAGNOSIS — E66.811 CLASS 1 OBESITY DUE TO EXCESS CALORIES WITHOUT SERIOUS COMORBIDITY WITH BODY MASS INDEX (BMI) OF 32.0 TO 32.9 IN ADULT: ICD-10-CM

## 2024-10-23 DIAGNOSIS — I48.0 PAROXYSMAL ATRIAL FIBRILLATION (HCC): Primary | ICD-10-CM

## 2024-10-23 DIAGNOSIS — I48.91 NEW ONSET ATRIAL FIBRILLATION (HCC): ICD-10-CM

## 2024-10-23 DIAGNOSIS — M25.511 RIGHT SHOULDER PAIN, UNSPECIFIED CHRONICITY: Primary | ICD-10-CM

## 2024-10-23 DIAGNOSIS — M19.011 PRIMARY OSTEOARTHRITIS OF RIGHT SHOULDER: ICD-10-CM

## 2024-10-23 DIAGNOSIS — I50.20 HEART FAILURE WITH REDUCED EJECTION FRACTION (HCC): ICD-10-CM

## 2024-10-23 PROCEDURE — 97110 THERAPEUTIC EXERCISES: CPT

## 2024-10-23 PROCEDURE — 99213 OFFICE O/P EST LOW 20 MIN: CPT | Performed by: PHYSICIAN ASSISTANT

## 2024-10-23 PROCEDURE — 93000 ELECTROCARDIOGRAM COMPLETE: CPT | Performed by: PHYSICIAN ASSISTANT

## 2024-10-23 PROCEDURE — 97140 MANUAL THERAPY 1/> REGIONS: CPT

## 2024-10-23 RX ORDER — DOFETILIDE 0.5 MG/1
500 CAPSULE ORAL EVERY 12 HOURS SCHEDULED
Qty: 60 CAPSULE | Refills: 2 | Status: SHIPPED | OUTPATIENT
Start: 2024-10-27

## 2024-10-23 NOTE — PROGRESS NOTES
Daily Note     Today's date: 10/23/2024  Patient name: Antonio Mckeon  : 1970  MRN: [id number]  Referring provider: Roland Flores DO  Dx:   Encounter Diagnosis     ICD-10-CM    1. Right shoulder pain, unspecified chronicity  M25.511       2. Primary osteoarthritis of right shoulder  M19.011         Start Time: 0805  Stop Time: 0840  Total time in clinic (min): 35 minutes    Subjective: Pt reports that he is very sore today due to retuning to full work duties. 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       Objective: See treatment diary below      Assessment: Tolerated treatment well. Pt's ROM was decreased at the start of therapy compared to previous visit. Following GH mobs, PROM returned to previous measures of 160 shoulder flexion and 90 shoulder abduction. Pt still has minimal Shoulder ER/IR ROM. Pt reports he noticed better active flexion and strength at work, demonstrating some functional improvement. Patient demonstrated fatigue post treatment, exhibited good technique with therapeutic exercises, and would benefit from continued PT for decreased symptom irritability, increased ROM, and increased strength.       Plan: Continue per plan of care.  Progress treatment as tolerated.       Precautions: Afib, Future surgery cardiology     POC expires Unit limit Auth Expiration date PT/OT/ST + Visit Limit?   12/3/24 BOMN PEND BOMN                           Visit/Unit Tracking  AUTH Status:  Date 9/10 10/9 10/21 10/23           Yes- 24 Used 1  6 7            Remaining    18 17             HEP:  [id number]     Manuals 9/10 9/16 9/18 10/7 10/9 10/21 10/23      GH Mob G3-4  JMK   JMK        R shoulder PROM  JMK AF  JMK JMK JMK      CPA/UPA    R UPA C5 gd 3-4         Inf GH glide    Thao gd 2-3 JMK G2-3 JMK G3-4 JMK G3-4      Post GH glide    THAO gd 2-3 JMK G2-3 JMK G3-4 JMK G3-4      Neuro Re-Ed             [address]  2x12 35# 2x12 35#  3x10 35# 3x12 35# 3x12 35#      Lloyd  "extension  2x12 20# 2x12 20#  3x10 25# 3x12 22# 3x12 22#      Prone Y  2x10 3\" 2x10 3\"  2x10 3\" 3x10 3\"       TB IR/ER      3x10 ea RTB                                              Ther Ex             HEP education 8'            UE bike  3'/3' 3'/3' 3'/3' 3'/3' 3'/3' 3'/3'      Thoracic extensions  1/2 FR x20 1/2 FR x20  1/2 FR x30        Supine cane flx   5\"x20          pulleys   3'  4'  4'      Cervical SNAG    THAO HEP X15 ea 3\" DC       Thoracic circuit      5' 5'                   Ther Activity                                       Gait Training                                       Modalities                                            "

## 2024-10-30 ENCOUNTER — PATIENT OUTREACH (OUTPATIENT)
Dept: CASE MANAGEMENT | Facility: OTHER | Age: 54
End: 2024-10-30

## 2024-10-30 ENCOUNTER — OFFICE VISIT (OUTPATIENT)
Dept: PHYSICAL THERAPY | Facility: CLINIC | Age: 54
End: 2024-10-30
Payer: COMMERCIAL

## 2024-10-30 DIAGNOSIS — M25.511 RIGHT SHOULDER PAIN, UNSPECIFIED CHRONICITY: Primary | ICD-10-CM

## 2024-10-30 DIAGNOSIS — M19.011 PRIMARY OSTEOARTHRITIS OF RIGHT SHOULDER: ICD-10-CM

## 2024-10-30 PROCEDURE — 97110 THERAPEUTIC EXERCISES: CPT

## 2024-10-30 PROCEDURE — 97112 NEUROMUSCULAR REEDUCATION: CPT

## 2024-10-30 PROCEDURE — 97140 MANUAL THERAPY 1/> REGIONS: CPT

## 2024-10-30 NOTE — PROGRESS NOTES
Daily Note     Today's date: 10/30/2024  Patient name: Antonio Mckeon  : 1970  MRN: [id number]  Referring provider: Roland Flores DO  Dx:   Encounter Diagnosis     ICD-10-CM    1. Right shoulder pain, unspecified chronicity  M25.511       2. Primary osteoarthritis of right shoulder  M19.011         Start Time: 0805  Stop Time: 0844  Total time in clinic (min): 39 minutes    Subjective: Pt reports that he has had more function in the R shoulder, but with more function he has more pain and soreness.       Objective: See treatment diary below      Assessment: Tolerated treatment well. Pt's ER w/ shoulder at 0 degree abduction has improved significantly. Shoulder abduction most limiting factor in function as Pt wants to throw a football. Pt continues to do well w/ strengthening exercises, continue to advance within symptom irritability levels. Patient demonstrated fatigue post treatment, exhibited good technique with therapeutic exercises, and would benefit from continued PT for decreased symptom irritability, increased ROM, and increased strength.       Plan: Continue per plan of care.  Progress treatment as tolerated.       Precautions: Afib, Future surgery cardiology     POC expires Unit limit Auth Expiration date PT/OT/ST + Visit Limit?   12/3/24 BOMN PEND BOMN                           Visit/Unit Tracking  AUTH Status:  Date 9/10 10/9 10/21 10/23 10/30          Yes- 24 Used 1  6 7 8           Remaining   19 18 17 16            HEP:  [id number]     Manuals 9/10 9/16 9/18 10/7 10/9 10/21 10/23 10/30     GH Mob G3-4  JMK   JMK        R shoulder PROM  JMK AF  JMK JMK JMK JMK     CPA/UPA    R UPA C5 gd 3-4         Inf GH glide    Thao gd 2-3 JMK G2-3 JMK G3-4 JMK G3-4 JMK G3-4     Post GH glide    THAO gd 2-3 JMK G2-3 JMK G3-4 JMK G3-4 JMK G3-4     Neuro Re-Ed             [address]  2x12 35# 2x12 35#  3x10 35# 3x12 35# 3x12 35# 3x12 35#     Lloyd extension  2x12 20# 2x12 20#  3x10 25# 3x12 22# 3x12 22# 3x12 22#  "    Prone Y  2x10 3\" 2x10 3\"  2x10 3\" 3x10 3\"       TB IR/ER      3x10 ea RTB  3x10 ea GTB     Supine shoulder flexion med ball        3x10 YMB                               Ther Ex             HEP education 8'            UE bike  3'/3' 3'/3' 3'/3' 3'/3' 3'/3' 3'/3' 3'/3'     Thoracic extensions  1/2 FR x20 1/2 FR x20  1/2 FR x30        Supine cane flx   5\"x20          pulleys   3'  4'  4' 5'     Cervical SNAG    THAO HEP X15 ea 3\" DC       Thoracic circuit      5' 5'                   Ther Activity                                       Gait Training                                       Modalities                                            "

## 2024-10-30 NOTE — PROGRESS NOTES
Antonio reports he is doing well. He is taking his medications as directed.He kept all of his follow up appointment. I advised him I was closing his case. He will call me if he needs assistance He has my contact information.

## 2024-11-06 ENCOUNTER — VBI (OUTPATIENT)
Dept: ADMINISTRATIVE | Facility: OTHER | Age: 54
End: 2024-11-06

## 2024-11-21 ENCOUNTER — RESULTS FOLLOW-UP (OUTPATIENT)
Dept: CARDIOLOGY CLINIC | Facility: CLINIC | Age: 54
End: 2024-11-21

## 2024-11-27 DIAGNOSIS — I48.91 NEW ONSET ATRIAL FIBRILLATION (HCC): ICD-10-CM

## 2024-11-27 RX ORDER — METOPROLOL SUCCINATE 50 MG/1
50 TABLET, EXTENDED RELEASE ORAL DAILY
Qty: 90 TABLET | Refills: 1 | Status: SHIPPED | OUTPATIENT
Start: 2024-11-27

## 2024-12-18 DIAGNOSIS — I48.91 NEW ONSET ATRIAL FIBRILLATION (HCC): ICD-10-CM

## 2024-12-18 RX ORDER — DOFETILIDE 0.5 MG/1
500 CAPSULE ORAL EVERY 12 HOURS SCHEDULED
Qty: 60 CAPSULE | Refills: 4 | Status: SHIPPED | OUTPATIENT
Start: 2024-12-18

## 2025-01-03 DIAGNOSIS — I10 PRIMARY HYPERTENSION: ICD-10-CM

## 2025-01-03 RX ORDER — LISINOPRIL 10 MG/1
10 TABLET ORAL DAILY
Qty: 90 TABLET | Refills: 1 | Status: SHIPPED | OUTPATIENT
Start: 2025-01-03

## 2025-01-22 DIAGNOSIS — I48.91 NEW ONSET ATRIAL FIBRILLATION (HCC): ICD-10-CM

## 2025-01-22 RX ORDER — DOFETILIDE 0.5 MG/1
500 CAPSULE ORAL EVERY 12 HOURS SCHEDULED
Qty: 60 CAPSULE | Refills: 0 | Status: SHIPPED | OUTPATIENT
Start: 2025-01-22

## 2025-01-22 NOTE — TELEPHONE ENCOUNTER
Sent to incorrect pharmacy in December, please resend to [facility].     Reason for call:   [x] Refill   [] Prior Auth  [] Other:     Office:   [] PCP/Provider -   [x] Specialty/Provider - Cardio     Medication: dofetilide (TIKOSYN) 500 mcg capsule     Dose/Frequency: 500 mcg, Oral, Every 12 hours scheduled     Quantity: 60    Pharmacy: [facility] - [place], [place] - [address] [place] [address]     Does the patient have enough for 3 days?   [] Yes   [x] No - Send as HP to POD

## 2025-01-23 ENCOUNTER — HOSPITAL ENCOUNTER (OUTPATIENT)
Dept: NON INVASIVE DIAGNOSTICS | Facility: CLINIC | Age: 55
Discharge: HOME | End: 2025-01-23
Payer: COMMERCIAL

## 2025-01-23 VITALS
SYSTOLIC BLOOD PRESSURE: 166 MMHG | BODY MASS INDEX: 32.76 KG/M2 | DIASTOLIC BLOOD PRESSURE: 82 MMHG | HEART RATE: 60 BPM | WEIGHT: 234 LBS | HEIGHT: 71 IN

## 2025-01-23 DIAGNOSIS — I50.20 HEART FAILURE WITH REDUCED EJECTION FRACTION (HCC): ICD-10-CM

## 2025-01-23 LAB
BSA FOR ECHO PROCEDURE: 2.25 M2
E WAVE DECELERATION TIME: 214 MS
E/A RATIO: 2.14
LEFT VENTRICLE DIASTOLIC VOLUME (MOD BIPLANE): 152 ML
LEFT VENTRICLE DIASTOLIC VOLUME INDEX (MOD BIPLANE): 67.6 ML/M2
LEFT VENTRICLE SYSTOLIC VOLUME (MOD BIPLANE): 53 ML
LEFT VENTRICLE SYSTOLIC VOLUME INDEX (MOD BIPLANE): 23.6 ML/M2
LV EF BIPLANE MOD: 65 %
LV EF US.2D.A4C+ESTIMATED: 67 %
MV E'TISSUE VEL-SEP: 10 CM/S
MV PEAK A VEL: 0.44 M/S
MV PEAK E VEL: 94 CM/S
MV STENOSIS PRESSURE HALF TIME: 62 MS
MV VALVE AREA P 1/2 METHOD: 3.55
SL CV LV EF: 55

## 2025-01-23 PROCEDURE — 93308 TTE F-UP OR LMTD: CPT

## 2025-01-23 PROCEDURE — 93308 TTE F-UP OR LMTD: CPT | Performed by: INTERNAL MEDICINE

## 2025-01-23 PROCEDURE — 93321 DOPPLER ECHO F-UP/LMTD STD: CPT | Performed by: INTERNAL MEDICINE

## 2025-01-23 PROCEDURE — 93325 DOPPLER ECHO COLOR FLOW MAPG: CPT | Performed by: INTERNAL MEDICINE

## 2025-01-23 PROCEDURE — 93325 DOPPLER ECHO COLOR FLOW MAPG: CPT

## 2025-01-23 PROCEDURE — 93321 DOPPLER ECHO F-UP/LMTD STD: CPT

## 2025-01-30 DIAGNOSIS — I48.91 NEW ONSET ATRIAL FIBRILLATION (HCC): ICD-10-CM

## 2025-01-30 RX ORDER — DOFETILIDE 0.5 MG/1
500 CAPSULE ORAL EVERY 12 HOURS SCHEDULED
Qty: 60 CAPSULE | Refills: 0 | Status: CANCELLED | OUTPATIENT
Start: 2025-01-30

## 2025-01-30 NOTE — TELEPHONE ENCOUNTER
Reason for call: [facility] state they did receive the 30D supply on 01.22.25 but there were no refills.    [x] Refill   [] Prior Auth  [] Other:     Office:   [] PCP/Provider -   [x] Specialty/Provider -     Medication: dofetilide    Dose/Frequency: 500 mcg Q12H     Quantity: 60 w refill    [facility]    Does the patient have enough for 3 days?   [x] Yes   [] No - Send as HP to POD

## 2025-01-31 DIAGNOSIS — I48.91 NEW ONSET ATRIAL FIBRILLATION (HCC): ICD-10-CM

## 2025-01-31 RX ORDER — METOPROLOL SUCCINATE 50 MG/1
25 TABLET, EXTENDED RELEASE ORAL DAILY
Start: 2025-01-31

## 2025-01-31 NOTE — PROGRESS NOTES
Called patient regarding echo results. Feeling fatigue. Hrs are 50's to 40's. Decrease metoprolol to 25 mg.

## 2025-02-04 DIAGNOSIS — I48.91 NEW ONSET ATRIAL FIBRILLATION (HCC): ICD-10-CM

## 2025-02-05 RX ORDER — DOFETILIDE 0.5 MG/1
CAPSULE ORAL
Qty: 60 CAPSULE | Refills: 10 | Status: SHIPPED | OUTPATIENT
Start: 2025-02-05

## 2025-02-25 ENCOUNTER — REMOTE DEVICE CLINIC VISIT (OUTPATIENT)
Dept: CARDIOLOGY CLINIC | Facility: CLINIC | Age: 55
End: 2025-02-25
Payer: COMMERCIAL

## 2025-02-25 DIAGNOSIS — I48.0 PAROXYSMAL ATRIAL FIBRILLATION (HCC): Primary | ICD-10-CM

## 2025-02-25 PROCEDURE — 93298 REM INTERROG DEV EVAL SCRMS: CPT | Performed by: INTERNAL MEDICINE

## 2025-02-25 NOTE — PROGRESS NOTES
"MDT LNQ22/ACTIVE SYSTEM IS MRI CONDITIONAL   CARELINK TRANSMISSION: LOOP RECORDER. PRESENTING RHYTHM VS @ 74 BPM. BATTERY STATUS \"OK.\" NO PATIENT OR DEVICE ACTIVATED EPISODES. NORMAL DEVICE FUNCTION. DL   "

## 2025-02-27 ENCOUNTER — RESULTS FOLLOW-UP (OUTPATIENT)
Dept: NON INVASIVE DIAGNOSTICS | Facility: HOSPITAL | Age: 55
End: 2025-02-27

## 2025-05-21 ENCOUNTER — VBI (OUTPATIENT)
Dept: ADMINISTRATIVE | Facility: OTHER | Age: 55
End: 2025-05-21

## 2025-05-21 NOTE — TELEPHONE ENCOUNTER
05/21/25 11:31 AM     Chart reviewed for CRC: Colonoscopy ; nothing is submitted to the patient's insurance at this time.     Cristy Zhou   [facility]

## 2025-05-27 ENCOUNTER — REMOTE DEVICE CLINIC VISIT (OUTPATIENT)
Dept: CARDIOLOGY CLINIC | Facility: CLINIC | Age: 55
End: 2025-05-27
Payer: COMMERCIAL

## 2025-05-27 DIAGNOSIS — I48.0 PAROXYSMAL ATRIAL FIBRILLATION (HCC): Primary | ICD-10-CM

## 2025-05-27 PROCEDURE — 93298 REM INTERROG DEV EVAL SCRMS: CPT | Performed by: INTERNAL MEDICINE

## 2025-05-27 NOTE — PROGRESS NOTES
"Results for orders placed or performed in visit on 05/27/25   Cardiac EP device report    Narrative    MDT LNQ22/ACTIVE SYSTEM IS MRI CONDITIONAL  CARELINK TRANSMISSION: BATTERY STATUS \"OK.\" NO PATIENT OR DEVICE ACTIVATED EPISODES. PVC BURDEN 0.2%â€”LYONS        "

## 2025-06-01 ENCOUNTER — RESULTS FOLLOW-UP (OUTPATIENT)
Dept: NON INVASIVE DIAGNOSTICS | Facility: HOSPITAL | Age: 55
End: 2025-06-01

## 2025-06-23 DIAGNOSIS — I48.91 NEW ONSET ATRIAL FIBRILLATION (HCC): ICD-10-CM

## 2025-06-23 RX ORDER — METOPROLOL SUCCINATE 50 MG/1
25 TABLET, EXTENDED RELEASE ORAL DAILY
Qty: 45 TABLET | Refills: 0 | Status: SHIPPED | OUTPATIENT
Start: 2025-06-23

## 2025-06-23 NOTE — TELEPHONE ENCOUNTER
Medication: metoprolol succinate     Dose/Frequency: 25 mg/Tale 0.5 tablets daily     Quantity: 45    Pharmacy: [facility]    Office:   [] PCP/Provider -   [x] Speciality/Provider - Sergio Coffey PA-C    Does the patient have enough for 3 days?   [] Yes   [x] No - Send as HP to POD

## 2025-07-04 DIAGNOSIS — I10 PRIMARY HYPERTENSION: ICD-10-CM

## 2025-07-07 ENCOUNTER — PATIENT MESSAGE (OUTPATIENT)
Dept: FAMILY MEDICINE CLINIC | Facility: CLINIC | Age: 55
End: 2025-07-07

## 2025-07-07 DIAGNOSIS — I10 PRIMARY HYPERTENSION: ICD-10-CM

## 2025-07-07 NOTE — PATIENT COMMUNICATION
Warm transfer to the office for further assistance  for Lisinopril refill, pt has no pills left,  overdue for PE w/PCP, provider no openings. 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 Pt advised his meds are managed by his PCP/Justine.

## 2025-07-08 RX ORDER — LISINOPRIL 10 MG/1
10 TABLET ORAL DAILY
Qty: 90 TABLET | Refills: 0 | OUTPATIENT
Start: 2025-07-08

## 2025-07-08 NOTE — PATIENT COMMUNICATION
We tried to reach Becca's 2:30 - LVM - it has to be in-office appt today not virtual - changed it but he hasn't called back yet

## 2025-07-08 NOTE — PATIENT COMMUNICATION
Patient is rescheduled for tomorrow but did not receive the [employer] message in time and thought he was still scheduled for a virtual visit.. Patient is requesting a phone call/ detailed message to be left for future communications. Please advise.

## 2025-07-09 ENCOUNTER — OFFICE VISIT (OUTPATIENT)
Dept: FAMILY MEDICINE CLINIC | Facility: CLINIC | Age: 55
End: 2025-07-09
Payer: COMMERCIAL

## 2025-07-09 VITALS
BODY MASS INDEX: 31.64 KG/M2 | OXYGEN SATURATION: 97 % | HEART RATE: 84 BPM | DIASTOLIC BLOOD PRESSURE: 84 MMHG | SYSTOLIC BLOOD PRESSURE: 142 MMHG | HEIGHT: 71 IN | WEIGHT: 226 LBS | TEMPERATURE: 97.8 F

## 2025-07-09 DIAGNOSIS — I48.91 ATRIAL FIBRILLATION, UNSPECIFIED TYPE (HCC): ICD-10-CM

## 2025-07-09 DIAGNOSIS — Z12.12 SCREENING FOR COLORECTAL CANCER: ICD-10-CM

## 2025-07-09 DIAGNOSIS — I10 PRIMARY HYPERTENSION: ICD-10-CM

## 2025-07-09 DIAGNOSIS — I50.20 HEART FAILURE WITH REDUCED EJECTION FRACTION (HCC): ICD-10-CM

## 2025-07-09 DIAGNOSIS — Z12.11 SCREENING FOR COLORECTAL CANCER: ICD-10-CM

## 2025-07-09 DIAGNOSIS — Z00.00 ANNUAL PHYSICAL EXAM: Primary | ICD-10-CM

## 2025-07-09 PROCEDURE — 99396 PREV VISIT EST AGE 40-64: CPT

## 2025-07-09 RX ORDER — LISINOPRIL 10 MG/1
10 TABLET ORAL DAILY
Qty: 90 TABLET | Refills: 1 | Status: SHIPPED | OUTPATIENT
Start: 2025-07-09

## 2025-07-09 RX ORDER — LISINOPRIL 10 MG/1
10 TABLET ORAL DAILY
Qty: 90 TABLET | Refills: 0 | Status: SHIPPED | OUTPATIENT
Start: 2025-07-09 | End: 2025-07-09 | Stop reason: SDUPTHER

## 2025-07-09 RX ORDER — LISINOPRIL 10 MG/1
10 TABLET ORAL DAILY
Qty: 90 TABLET | Refills: 1 | OUTPATIENT
Start: 2025-07-09

## 2025-07-09 NOTE — PATIENT INSTRUCTIONS
"Patient Education     Routine physical for adults   The Basics   Written by the doctors and editors at [facility]   What is a physical? -- A physical is a routine visit, or \"check-up,\" with your doctor. You might also hear it called a \"wellness visit\" or \"preventive visit.\"  During each visit, the doctor will:   Ask about your physical and mental health   Ask about your habits, behaviors, and lifestyle   Do an exam   Give you vaccines if needed   Talk to you about any medicines you take   Give advice about your health   Answer your questions  Getting regular check-ups is an important part of taking care of your health. It can help your doctor find and treat any problems you have. But it's also important for preventing health problems.  A routine physical is different from a \"sick visit.\" A sick visit is when you see a doctor because of a health concern or problem. Since physicals are scheduled ahead of time, you can think about what you want to ask the doctor.  How often should I get a physical? -- It depends on your age and health. In general, for people age 21 years and older:   If you are younger than 50 years, you might be able to get a physical every 3 years.   If you are 50 years or older, your doctor might recommend a physical every year.  If you have an ongoing health condition, like diabetes or high blood pressure, your doctor will probably want to see you more often.  What happens during a physical? -- In general, each visit will include:   Physical exam - The doctor or nurse will check your height, weight, heart rate, and blood pressure. They will also look at your eyes and ears. They will ask about how you are feeling and whether you have any symptoms that bother you.   Medicines - It's a good idea to bring a list of all the medicines you take to each doctor visit. Your doctor will talk to you about your medicines and answer any questions. Tell them if you are having any side effects that bother you. You " "should also tell them if you are having trouble paying for any of your medicines.   Habits and behaviors - This includes:   Your diet   Your exercise habits   Whether you smoke, drink alcohol, or use drugs   Whether you are sexually active   Whether you feel safe at home  Your doctor will talk to you about things you can do to improve your health and lower your risk of health problems. They will also offer help and support. For example, if you want to quit smoking, they can give you advice and might prescribe medicines. If you want to improve your diet or get more physical activity, they can help you with this, too.   Lab tests, if needed - The tests you get will depend on your age and situation. For example, your doctor might want to check your:   Cholesterol   Blood sugar   Iron level   Vaccines - The recommended vaccines will depend on your age, health, and what vaccines you already had. Vaccines are very important because they can prevent certain serious or deadly infections.   Discussion of screening - \"Screening\" means checking for diseases or other health problems before they cause symptoms. Your doctor can recommend screening based on your age, risk, and preferences. This might include tests to check for:   Cancer, such as breast, prostate, cervical, ovarian, colorectal, prostate, lung, or skin cancer   Sexually transmitted infections, such as chlamydia and gonorrhea   Mental health conditions like depression and anxiety  Your doctor will talk to you about the different types of screening tests. They can help you decide which screenings to have. They can also explain what the results might mean.   Answering questions - The physical is a good time to ask the doctor or nurse questions about your health. If needed, they can refer you to other doctors or specialists, too.  Adults older than 65 years often need other care, too. As you get older, your doctor will talk to you about:   How to prevent falling at " home   Hearing or vision tests   Memory testing   How to take your medicines safely   Making sure that you have the help and support you need at home  All topics are updated as new evidence becomes available and our peer review process is complete.  This topic retrieved from [employer] on: May 02, 2024.  Topic 673113 Version 1.0  Release: 32.4.3 - C32.122  © 2024 UpToDate, Inc. and/or its affiliates. All rights reserved.  Consumer Information Use and Disclaimer   Disclaimer: This generalized information is a limited summary of diagnosis, treatment, and/or medication information. It is not meant to be comprehensive and should be used as a tool to help the user understand and/or assess potential diagnostic and treatment options. It does NOT include all information about conditions, treatments, medications, side effects, or risks that may apply to a specific patient. It is not intended to be medical advice or a substitute for the medical advice, diagnosis, or treatment of a health care provider based on the health care provider's examination and assessment of a patient's specific and unique circumstances. Patients must speak with a health care provider for complete information about their health, medical questions, and treatment options, including any risks or benefits regarding use of medications. This information does not endorse any treatments or medications as safe, effective, or approved for treating a specific patient. UpToDate, Inc. and its affiliates disclaim any warranty or liability relating to this information or the use thereof.The use of this information is governed by the Terms of Use, available at https://www.[employer]tersOculo Therapyuwer.com/en/know/clinical-effectiveness-terms. 2024© UpToDate, Inc. and its affiliates and/or licensors. All rights reserved.  Copyright   © 2024 UpToDate, Inc. and/or its affiliates. All rights reserved.

## 2025-07-09 NOTE — ASSESSMENT & PLAN NOTE
Wt Readings from Last 3 Encounters:   07/09/25 103 kg (226 lb)   01/23/25 106 kg (234 lb)   10/23/24 106 kg (234 lb 11.2 oz)     -Following with cardiology  -Cont Eliquis, Dofetilide, and metoprolol   -s/p ablation and loop recorder implant 9/2024

## 2025-07-09 NOTE — ASSESSMENT & PLAN NOTE
-repeat 134/84 today in office  -Averages 117/60s at home, takes BP readings 2x a day   -Lisinopril 10 mg daily   -Refill sent   Orders:    lisinopril (ZESTRIL) 10 mg tablet; Take 1 tablet (10 mg total) by mouth daily

## 2025-08-02 DIAGNOSIS — I48.91 NEW ONSET ATRIAL FIBRILLATION (HCC): ICD-10-CM

## 2025-08-04 RX ORDER — APIXABAN 5 MG/1
5 TABLET, FILM COATED ORAL 2 TIMES DAILY
Qty: 60 TABLET | Refills: 0 | Status: SHIPPED | OUTPATIENT
Start: 2025-08-04

## (undated) DEVICE — PINNACLE INTRODUCER SHEATH: Brand: PINNACLE

## (undated) DEVICE — GLIDESHEATH SLENDER STAINLESS STEEL KIT: Brand: GLIDESHEATH SLENDER

## (undated) DEVICE — RADIFOCUS OPTITORQUE ANGIOGRAPHIC CATHETER: Brand: OPTITORQUE

## (undated) DEVICE — CABLE CATH CONNECTION FARASTAR

## (undated) DEVICE — DGW .035 FC STR 260CM TEF: Brand: EMERALD

## (undated) DEVICE — CATH ULTRASOUND ACUNAV ICE 8FR 90CM GE VIVID-I

## (undated) DEVICE — DGW .035 FC J3MM 150CM TEF: Brand: EMERALD

## (undated) DEVICE — CATH EP ADVISOR HD GRID MAPPING 8F

## (undated) DEVICE — CATH ABLATION FARAWAVE PULSED FIELD 31MM

## (undated) DEVICE — REF PATCH ENSITE X

## (undated) DEVICE — DGW .035 FC J3MM 260CM TEF: Brand: EMERALD

## (undated) DEVICE — SHEATH STEERABLE FARADRIVE

## (undated) DEVICE — ROSEN CURVED WIRE GUIDE: Brand: ROSEN

## (undated) DEVICE — INTRO SHEATH 8FR 24CM ARROW-FLEX

## (undated) DEVICE — CATH EP 7FR DI-DIR 10-POLE DEFL CS 2-8-2 FJ

## (undated) DEVICE — 1 X VERSACROSS CONNECT TRANSSEPTAL DILATOR;  1 X VERSACROSS RF WIRE (INCLUDING 1 X CONNECTOR CABLE (SINGLE USE)): Brand: VERSACROSS CONNECT ACCESS SOLUTION FOR FARADRIVE